# Patient Record
Sex: FEMALE | Race: WHITE | NOT HISPANIC OR LATINO | Employment: UNEMPLOYED | URBAN - METROPOLITAN AREA
[De-identification: names, ages, dates, MRNs, and addresses within clinical notes are randomized per-mention and may not be internally consistent; named-entity substitution may affect disease eponyms.]

---

## 2017-04-24 ENCOUNTER — HOSPITAL ENCOUNTER (EMERGENCY)
Facility: MEDICAL CENTER | Age: 27
End: 2017-04-24
Attending: EMERGENCY MEDICINE
Payer: MEDICAID

## 2017-04-24 VITALS
OXYGEN SATURATION: 95 % | HEIGHT: 64 IN | DIASTOLIC BLOOD PRESSURE: 59 MMHG | BODY MASS INDEX: 19.63 KG/M2 | HEART RATE: 61 BPM | SYSTOLIC BLOOD PRESSURE: 101 MMHG | RESPIRATION RATE: 12 BRPM | WEIGHT: 115 LBS | TEMPERATURE: 96.8 F

## 2017-04-24 DIAGNOSIS — F41.9 ANXIETY: ICD-10-CM

## 2017-04-24 DIAGNOSIS — R10.84 GENERALIZED ABDOMINAL PAIN: ICD-10-CM

## 2017-04-24 DIAGNOSIS — F12.10 MARIJUANA ABUSE: ICD-10-CM

## 2017-04-24 DIAGNOSIS — R11.15 NON-INTRACTABLE CYCLICAL VOMITING WITH NAUSEA: ICD-10-CM

## 2017-04-24 LAB
ALBUMIN SERPL BCP-MCNC: 4.2 G/DL (ref 3.2–4.9)
ALBUMIN/GLOB SERPL: 1.6 G/DL
ALP SERPL-CCNC: 77 U/L (ref 30–99)
ALT SERPL-CCNC: 14 U/L (ref 2–50)
AMORPH CRY #/AREA URNS HPF: PRESENT /HPF
AMPHET UR QL SCN: NEGATIVE
ANION GAP SERPL CALC-SCNC: 11 MMOL/L (ref 0–11.9)
APPEARANCE UR: ABNORMAL
AST SERPL-CCNC: 19 U/L (ref 12–45)
BACTERIA #/AREA URNS HPF: ABNORMAL /HPF
BARBITURATES UR QL SCN: NEGATIVE
BASOPHILS # BLD AUTO: 0.1 % (ref 0–1.8)
BASOPHILS # BLD: 0.01 K/UL (ref 0–0.12)
BENZODIAZ UR QL SCN: NEGATIVE
BILIRUB SERPL-MCNC: 0.8 MG/DL (ref 0.1–1.5)
BILIRUB UR QL STRIP.AUTO: NEGATIVE
BUN SERPL-MCNC: 15 MG/DL (ref 8–22)
BZE UR QL SCN: NEGATIVE
CALCIUM SERPL-MCNC: 9 MG/DL (ref 8.5–10.5)
CANNABINOIDS UR QL SCN: POSITIVE
CHLORIDE SERPL-SCNC: 100 MMOL/L (ref 96–112)
CO2 SERPL-SCNC: 26 MMOL/L (ref 20–33)
COLOR UR: ABNORMAL
CREAT SERPL-MCNC: 0.49 MG/DL (ref 0.5–1.4)
CULTURE IF INDICATED INDCX: YES UA CULTURE
EKG IMPRESSION: NORMAL
EOSINOPHIL # BLD AUTO: 0 K/UL (ref 0–0.51)
EOSINOPHIL NFR BLD: 0 % (ref 0–6.9)
EPI CELLS #/AREA URNS HPF: ABNORMAL /HPF
ERYTHROCYTE [DISTWIDTH] IN BLOOD BY AUTOMATED COUNT: 41.8 FL (ref 35.9–50)
GFR SERPL CREATININE-BSD FRML MDRD: >60 ML/MIN/1.73 M 2
GLOBULIN SER CALC-MCNC: 2.7 G/DL (ref 1.9–3.5)
GLUCOSE SERPL-MCNC: 98 MG/DL (ref 65–99)
GLUCOSE UR STRIP.AUTO-MCNC: NEGATIVE MG/DL
HCG UR QL: NEGATIVE
HCT VFR BLD AUTO: 37.5 % (ref 37–47)
HGB BLD-MCNC: 13.5 G/DL (ref 12–16)
IMM GRANULOCYTES # BLD AUTO: 0.02 K/UL (ref 0–0.11)
IMM GRANULOCYTES NFR BLD AUTO: 0.2 % (ref 0–0.9)
KETONES UR STRIP.AUTO-MCNC: ABNORMAL MG/DL
LEUKOCYTE ESTERASE UR QL STRIP.AUTO: ABNORMAL
LIPASE SERPL-CCNC: 4 U/L (ref 11–82)
LYMPHOCYTES # BLD AUTO: 1.38 K/UL (ref 1–4.8)
LYMPHOCYTES NFR BLD: 16 % (ref 22–41)
MCH RBC QN AUTO: 33.3 PG (ref 27–33)
MCHC RBC AUTO-ENTMCNC: 36 G/DL (ref 33.6–35)
MCV RBC AUTO: 92.6 FL (ref 81.4–97.8)
MDMA UR QL SCN: NEGATIVE
METHADONE UR QL SCN: NEGATIVE
MICRO URNS: ABNORMAL
MONOCYTES # BLD AUTO: 0.36 K/UL (ref 0–0.85)
MONOCYTES NFR BLD AUTO: 4.2 % (ref 0–13.4)
MUCOUS THREADS #/AREA URNS HPF: ABNORMAL /HPF
NEUTROPHILS # BLD AUTO: 6.83 K/UL (ref 2–7.15)
NEUTROPHILS NFR BLD: 79.5 % (ref 44–72)
NITRITE UR QL STRIP.AUTO: NEGATIVE
NRBC # BLD AUTO: 0 K/UL
NRBC BLD AUTO-RTO: 0 /100 WBC
OPIATES UR QL SCN: NEGATIVE
OXYCODONE UR QL SCN: NEGATIVE
PCP UR QL SCN: NEGATIVE
PH UR STRIP.AUTO: 6 [PH]
PLATELET # BLD AUTO: 175 K/UL (ref 164–446)
PMV BLD AUTO: 10.6 FL (ref 9–12.9)
POTASSIUM SERPL-SCNC: 3 MMOL/L (ref 3.6–5.5)
PROPOXYPH UR QL SCN: NEGATIVE
PROT SERPL-MCNC: 6.9 G/DL (ref 6–8.2)
PROT UR QL STRIP: 70 MG/DL
RBC # BLD AUTO: 4.05 M/UL (ref 4.2–5.4)
RBC # URNS HPF: ABNORMAL /HPF
RBC UR QL AUTO: ABNORMAL
SODIUM SERPL-SCNC: 137 MMOL/L (ref 135–145)
SP GR UR REFRACTOMETRY: 1.02
SP GR UR STRIP.AUTO: 1.02
WBC # BLD AUTO: 8.6 K/UL (ref 4.8–10.8)
WBC #/AREA URNS HPF: ABNORMAL /HPF

## 2017-04-24 PROCEDURE — 81001 URINALYSIS AUTO W/SCOPE: CPT | Mod: 59

## 2017-04-24 PROCEDURE — 83690 ASSAY OF LIPASE: CPT

## 2017-04-24 PROCEDURE — 700102 HCHG RX REV CODE 250 W/ 637 OVERRIDE(OP): Performed by: EMERGENCY MEDICINE

## 2017-04-24 PROCEDURE — 96361 HYDRATE IV INFUSION ADD-ON: CPT

## 2017-04-24 PROCEDURE — 87086 URINE CULTURE/COLONY COUNT: CPT

## 2017-04-24 PROCEDURE — 96374 THER/PROPH/DIAG INJ IV PUSH: CPT

## 2017-04-24 PROCEDURE — 99284 EMERGENCY DEPT VISIT MOD MDM: CPT

## 2017-04-24 PROCEDURE — 700105 HCHG RX REV CODE 258: Performed by: EMERGENCY MEDICINE

## 2017-04-24 PROCEDURE — 80053 COMPREHEN METABOLIC PANEL: CPT

## 2017-04-24 PROCEDURE — 85025 COMPLETE CBC W/AUTO DIFF WBC: CPT

## 2017-04-24 PROCEDURE — 93005 ELECTROCARDIOGRAM TRACING: CPT

## 2017-04-24 PROCEDURE — 81025 URINE PREGNANCY TEST: CPT

## 2017-04-24 PROCEDURE — 36415 COLL VENOUS BLD VENIPUNCTURE: CPT

## 2017-04-24 PROCEDURE — 700111 HCHG RX REV CODE 636 W/ 250 OVERRIDE (IP): Performed by: EMERGENCY MEDICINE

## 2017-04-24 PROCEDURE — 80307 DRUG TEST PRSMV CHEM ANLYZR: CPT

## 2017-04-24 PROCEDURE — 96375 TX/PRO/DX INJ NEW DRUG ADDON: CPT

## 2017-04-24 PROCEDURE — A9270 NON-COVERED ITEM OR SERVICE: HCPCS | Performed by: EMERGENCY MEDICINE

## 2017-04-24 RX ORDER — SODIUM CHLORIDE 9 MG/ML
1000 INJECTION, SOLUTION INTRAVENOUS CONTINUOUS
Status: DISCONTINUED | OUTPATIENT
Start: 2017-04-24 | End: 2017-04-24

## 2017-04-24 RX ORDER — ONDANSETRON 2 MG/ML
4 INJECTION INTRAMUSCULAR; INTRAVENOUS ONCE
Status: COMPLETED | OUTPATIENT
Start: 2017-04-24 | End: 2017-04-24

## 2017-04-24 RX ORDER — LORAZEPAM 2 MG/ML
INJECTION INTRAMUSCULAR
Status: DISCONTINUED
Start: 2017-04-24 | End: 2017-04-24 | Stop reason: HOSPADM

## 2017-04-24 RX ORDER — NITROFURANTOIN MACROCRYSTALS 100 MG/1
100 CAPSULE ORAL 2 TIMES DAILY
Qty: 10 CAP | Refills: 0 | Status: ON HOLD | OUTPATIENT
Start: 2017-04-24 | End: 2017-04-30

## 2017-04-24 RX ORDER — KETOROLAC TROMETHAMINE 30 MG/ML
30 INJECTION, SOLUTION INTRAMUSCULAR; INTRAVENOUS ONCE
Status: COMPLETED | OUTPATIENT
Start: 2017-04-24 | End: 2017-04-24

## 2017-04-24 RX ORDER — SODIUM CHLORIDE 9 MG/ML
1000 INJECTION, SOLUTION INTRAVENOUS CONTINUOUS
Status: DISCONTINUED | OUTPATIENT
Start: 2017-04-24 | End: 2017-04-24 | Stop reason: CLARIF

## 2017-04-24 RX ORDER — CEFTRIAXONE 1 G/1
1 INJECTION, POWDER, FOR SOLUTION INTRAMUSCULAR; INTRAVENOUS ONCE
Status: DISCONTINUED | OUTPATIENT
Start: 2017-04-24 | End: 2017-04-24 | Stop reason: CLARIF

## 2017-04-24 RX ORDER — ONDANSETRON 4 MG/1
4 TABLET, ORALLY DISINTEGRATING ORAL EVERY 8 HOURS PRN
Qty: 10 TAB | Refills: 1 | Status: SHIPPED | OUTPATIENT
Start: 2017-04-24 | End: 2017-04-29

## 2017-04-24 RX ORDER — NITROFURANTOIN MACROCRYSTALS 50 MG/1
100 CAPSULE ORAL ONCE
Status: COMPLETED | OUTPATIENT
Start: 2017-04-24 | End: 2017-04-24

## 2017-04-24 RX ORDER — LORAZEPAM 2 MG/ML
1 INJECTION INTRAMUSCULAR ONCE
Status: COMPLETED | OUTPATIENT
Start: 2017-04-24 | End: 2017-04-24

## 2017-04-24 RX ORDER — CYCLOBENZAPRINE HCL 10 MG
10 TABLET ORAL 3 TIMES DAILY PRN
Qty: 20 TAB | Refills: 0 | Status: SHIPPED | OUTPATIENT
Start: 2017-04-24 | End: 2018-03-05

## 2017-04-24 RX ORDER — HYDROCODONE BITARTRATE AND ACETAMINOPHEN 5; 325 MG/1; MG/1
1-2 TABLET ORAL EVERY 8 HOURS PRN
Qty: 15 TAB | Refills: 0 | Status: SHIPPED | OUTPATIENT
Start: 2017-04-24 | End: 2017-04-29

## 2017-04-24 RX ORDER — CYCLOBENZAPRINE HCL 10 MG
10 TABLET ORAL 3 TIMES DAILY PRN
Qty: 20 TAB | Refills: 0 | Status: SHIPPED | OUTPATIENT
Start: 2017-04-24 | End: 2017-04-29

## 2017-04-24 RX ADMIN — ONDANSETRON 4 MG: 2 INJECTION INTRAMUSCULAR; INTRAVENOUS at 11:30

## 2017-04-24 RX ADMIN — LORAZEPAM 1 MG: 2 INJECTION INTRAMUSCULAR; INTRAVENOUS at 11:31

## 2017-04-24 RX ADMIN — KETOROLAC TROMETHAMINE 30 MG: 30 INJECTION, SOLUTION INTRAMUSCULAR; INTRAVENOUS at 11:30

## 2017-04-24 RX ADMIN — SODIUM CHLORIDE 1000 ML: 9 INJECTION, SOLUTION INTRAVENOUS at 11:30

## 2017-04-24 RX ADMIN — NITROFURANTOIN (MACROCRYSTALS) 100 MG: 50 CAPSULE ORAL at 13:47

## 2017-04-24 ASSESSMENT — PAIN SCALES - GENERAL
PAINLEVEL_OUTOF10: 8
PAINLEVEL_OUTOF10: 10

## 2017-04-24 NOTE — ED AVS SNAPSHOT
Home Care Instructions                                                                                                                Rupa Yang   MRN: [id number]    Department:  [facility], Emergency Dept   Date of Visit:  4/24/2017            [facility], Emergency Dept    [address]    [place] [address]    Phone:  [phone]      You were seen by     Emeterio Carroll D.O.      Your Diagnosis Was     Generalized abdominal pain     R10.84       These are the medications you received during your hospitalization from 04/24/2017 1045 to 04/24/2017 1349     Date/Time Order Dose Route Action    04/24/2017 1131 lorazepam (ATIVAN) injection 1 mg 1 mg Intravenous Given    04/24/2017 1130 ketorolac (TORADOL) 30 mg injection 30 mg Intravenous Given    04/24/2017 1130 ondansetron (ZOFRAN) syringe/vial injection 4 mg 4 mg Intravenous Given    04/24/2017 1130 NS infusion 1,000 mL 1,000 mL Intravenous New Bag    04/24/2017 1347 nitrofurantoin (MACRODANTIN) capsule 100 mg 100 mg Oral Given      Medication Information     Review all of your home medications and newly ordered medications with your primary doctor and/or pharmacist as soon as possible. Follow medication instructions as directed by your doctor and/or pharmacist.     Please keep your complete medication list with you and share with your physician. Update the information when medications are discontinued, doses are changed, or new medications (including over-the-counter products) are added; and carry medication information at all times in the event of emergency situations.               Medication List      START taking these medications        Instructions    Morning Afternoon Evening Bedtime    nitrofurantoin macro crystals 100 MG Caps   Last time this was given:  100 mg on 4/24/2017  1:47 PM   Commonly known as:  MACRODANTIN        Take 1 Cap by mouth 2 Times a Day for 7 days.   Dose:  100 mg                          ASK your doctor about these medications        Instructions    Morning Afternoon Evening Bedtime    alprazolam 0.25 MG Tabs   Commonly known as:  XANAX        Take 1 Tab by mouth at bedtime as needed for Sleep.   Dose:  0.25 mg                        * cyclobenzaprine 5 MG tablet   What changed:  Another medication with the same name was added. Make sure you understand how and when to take each.   Commonly known as:  FLEXERIL   Ask about: Which instructions should I use?        Take 1-2 Tabs by mouth 3 times a day as needed.   Dose:  5-10 mg                        * cyclobenzaprine 10 MG Tabs   What changed:  You were already taking a medication with the same name, and this prescription was added. Make sure you understand how and when to take each.   Commonly known as:  FLEXERIL   Ask about: Which instructions should I use?        Take 1 Tab by mouth 3 times a day as needed for Muscle Spasms.   Dose:  10 mg                        * cyclobenzaprine 10 MG Tabs   What changed:  You were already taking a medication with the same name, and this prescription was added. Make sure you understand how and when to take each.   Commonly known as:  FLEXERIL   Ask about: Which instructions should I use?        Take 1 Tab by mouth 3 times a day as needed for Muscle Spasms.   Dose:  10 mg                        * hydrocodone-acetaminophen 5-325 MG Tabs per tablet   What changed:  Another medication with the same name was added. Make sure you understand how and when to take each.   Commonly known as:  NORCO   Ask about: Which instructions should I use?        Take 1-2 Tabs by mouth as needed. Indications: Moderate to Moderately Severe Pain   Dose:  1-2 Tab                        * hydrocodone-acetaminophen 5-325 MG Tabs per tablet   What changed:  Another medication with the same name was added. Make sure you understand how and when to take each.   Commonly known as:  NORCO   Ask about: Which instructions should I use?         Take 1-2 Tabs by mouth every four hours as needed.   Dose:  1-2 Tab                        * hydrocodone-acetaminophen 5-325 MG Tabs per tablet   What changed:  Another medication with the same name was added. Make sure you understand how and when to take each.   Commonly known as:  NORCO   Ask about: Which instructions should I use?        Take 1-2 Tabs by mouth every 6 hours as needed.   Dose:  1-2 Tab                        * hydrocodone-acetaminophen 5-325 MG Tabs per tablet   What changed:  Another medication with the same name was added. Make sure you understand how and when to take each.   Commonly known as:  NORCO   Ask about: Which instructions should I use?        Take 1 Tab by mouth every four hours as needed.   Dose:  1 Tab                        * hydrocodone-acetaminophen 5-325 MG Tabs per tablet   What changed:  Another medication with the same name was added. Make sure you understand how and when to take each.   Commonly known as:  NORCO   Ask about: Which instructions should I use?        Take 1-2 Tabs by mouth every 6 hours as needed.   Dose:  1-2 Tab                        * hydrocodone-acetaminophen 5-325 MG Tabs per tablet   What changed:  You were already taking a medication with the same name, and this prescription was added. Make sure you understand how and when to take each.   Commonly known as:  NORCO   Ask about: Which instructions should I use?        Take 1-2 Tabs by mouth every 8 hours as needed (pain).   Dose:  1-2 Tab                        MIDOL MAX ST MENSTRUAL FORMULA PO        Take 1 Tab by mouth as needed.   Dose:  1 Tab                        omeprazole 20 MG delayed-release capsule   Commonly known as:  PRILOSEC        Take 1 Cap by mouth every day.   Dose:  20 mg                        * ondansetron 4 MG Tabs tablet   Commonly known as:  ZOFRAN        Take 1 Tab by mouth every 8 hours as needed (FOR NAUSEA OR VIMITING) for up to 6 doses.   Dose:  4 mg                        *  ondansetron 4 MG Tabs tablet   Commonly known as:  ZOFRAN        Take 1 Tab by mouth every four hours as needed for Nausea/Vomiting.   Dose:  4 mg                        * ondansetron 8 MG Tbdp   What changed:  Another medication with the same name was added. Make sure you understand how and when to take each.   Commonly known as:  ZOFRAN ODT   Ask about: Which instructions should I use?        DISSOLVE ONE TABLET BY MOUTH EVERY 8 HOURS AS NEEDED FOR NAUSEA OR VOMITING                        * ondansetron 4 MG Tbdp   What changed:  Another medication with the same name was added. Make sure you understand how and when to take each.   Commonly known as:  ZOFRAN ODT   Ask about: Which instructions should I use?        Take 1 Tab by mouth every four hours as needed.   Dose:  4 mg                        * ondansetron 4 MG Tbdp   What changed:  You were already taking a medication with the same name, and this prescription was added. Make sure you understand how and when to take each.   Commonly known as:  ZOFRAN ODT   Ask about: Which instructions should I use?        Take 1 Tab by mouth every 8 hours as needed.   Dose:  4 mg                        sucralfate 1 GM Tabs   Commonly known as:  CARAFATE        Take 1 Tab by mouth 4 Times a Day,Before Meals and at Bedtime.   Dose:  1 g                        * Notice:  This list has 14 medication(s) that are the same as other medications prescribed for you. Read the directions carefully, and ask your doctor or other care provider to review them with you.         Where to Get Your Medications      These medications were sent to [id number]  [address] [address] [address] [facility] [place] [address]     Phone:  [phone]    - cyclobenzaprine 10 MG Tabs      You can get these medications from any pharmacy     Bring a paper prescription for each of these medications    - cyclobenzaprine 10 MG Tabs  - hydrocodone-acetaminophen  5-325 MG Tabs per tablet  - nitrofurantoin macro crystals 100 MG Caps  - ondansetron 4 MG Tbdp            Procedures and tests performed during your visit     BETA-HCG QUALITATIVE URINE    CARDIAC MONITORING    CBC WITH DIFFERENTIAL    COMP METABOLIC PANEL    EKG (ER)    ESTIMATED GFR    LIPASE    O2 Protocol    REFRACTOMETER SG    SALINE LOCK    URINALYSIS,CULTURE IF INDICATED    URINE CULTURE(NEW)    URINE DRUG SCREEN    URINE MICROSCOPIC (W/UA)        Discharge Instructions       Abdominal Pain (Nonspecific)  Your exam might not show the exact reason you have abdominal pain. Since there are many different causes of abdominal pain, another checkup and more tests may be needed. It is very important to follow up for lasting (persistent) or worsening symptoms. A possible cause of abdominal pain in any person who still has his or her appendix is acute appendicitis. Appendicitis is often hard to diagnose. Normal blood tests, urine tests, ultrasound, and CT scans do not completely rule out early appendicitis or other causes of abdominal pain. Sometimes, only the changes that happen over time will allow appendicitis and other causes of abdominal pain to be determined. Other potential problems that may require surgery may also take time to become more apparent. Because of this, it is important that you follow all of the instructions below.  HOME CARE INSTRUCTIONS   · Rest as much as possible.   · Do not eat solid food until your pain is gone.   · While adults or children have pain: A diet of water, weak decaffeinated tea, broth or bouillon, gelatin, oral rehydration solutions (ORS), frozen ice pops, or ice chips may be helpful.   · When pain is gone in adults or children: Start a light diet (dry toast, crackers, applesauce, or white rice). Increase the diet slowly as long as it does not bother you. Eat no dairy products (including cheese and eggs) and no spicy, fatty, fried, or high-fiber foods.   · Use no alcohol,  caffeine, or cigarettes.   · Take your regular medicines unless your caregiver told you not to.   · Take any prescribed medicine as directed.   · Only take over-the-counter or prescription medicines for pain, discomfort, or fever as directed by your caregiver. Do not give aspirin to children.   If your caregiver has given you a follow-up appointment, it is very important to keep that appointment. Not keeping the appointment could result in a permanent injury and/or lasting (chronic) pain and/or disability. If there is any problem keeping the appointment, you must call to reschedule.   SEEK IMMEDIATE MEDICAL CARE IF:   · Your pain is not gone in 24 hours.   · Your pain becomes worse, changes location, or feels different.   · You or your child has an oral temperature above 102° F (38.9° C), not controlled by medicine.   · Your baby is older than 3 months with a rectal temperature of 102° F (38.9° C) or higher.   · Your baby is 3 months old or younger with a rectal temperature of 100.4° F (38° C) or higher.   · You have shaking chills.   · You keep throwing up (vomiting) or cannot drink liquids.   · There is blood in your vomit or you see blood in your bowel movements.   · Your bowel movements become dark or black.   · You have frequent bowel movements.   · Your bowel movements stop (become blocked) or you cannot pass gas.   · You have bloody, frequent, or painful urination.   · You have yellow discoloration in the skin or whites of the eyes.   · Your stomach becomes bloated or bigger.   · You have dizziness or fainting.   · You have chest or back pain.   MAKE SURE YOU:   · Understand these instructions.   · Will watch your condition.   · Will get help right away if you are not doing well or get worse.   Document Released: 12/18/2006 Document Revised: 03/11/2013 Document Reviewed: 11/15/2010  ExitCare® Patient Information ©2013 [employer].          Patient Information     Patient Information    Following emergency  treatment: all patient requiring follow-up care must return either to a private physician or a clinic if your condition worsens before you are able to obtain further medical attention, please return to the emergency room.     Billing Information    At [facility], we work to make the billing process streamlined for our patients.  Our Representatives are here to answer any questions you may have regarding your hospital bill.  If you have insurance coverage and have supplied your insurance information to us, we will submit a claim to your insurer on your behalf.  Should you have any questions regarding your bill, we can be reached online or by phone as follows:  Online: You are able pay your bills online or live chat with our representatives about any billing questions you may have. We are here to help Monday - Friday from 8:00am to 7:30pm and 9:00am - 12:00pm on Saturdays.  Please visit [email or web address]  for more information.   Phone:  [phone] or [phone]    Please note that your emergency physician, surgeon, pathologist, radiologist, anesthesiologist, and other specialists are not employed by [facility] and will therefore bill separately for their services.  Please contact them directly for any questions concerning their bills at the numbers below:     Emergency Physician Services:  [phone]  [facility]:  [phone]  [facility]:  [phone]  [facility]:  [phone]    1. Your final bill may vary from the amount quoted upon discharge if all procedures are not complete at that time, or if your doctor has additional procedures of which we are not aware. You will receive an additional bill if you return to the Emergency Department at [facility] for suture removal regardless of the facility of which the sutures were placed.     2. Please arrange for settlement of this account at the emergency  registration.    3. All self-pay accounts are due in full at the time of treatment.  If you are unable to meet this obligation then payment is expected within 4-5 days.     4. If you have had radiology studies (CT, X-ray, Ultrasound, MRI), you have received a preliminary result during your emergency department visit. Please contact the radiology department [phone] to receive a copy of your final result. Please discuss the Final result with your primary physician or with the follow up physician provided.     Crisis Hotline:  [place] Crisis Hotline:  9-703-OBCTYNF or 1-772.810.5694  [place] Crisis Hotline:    1-294.126.3318 or 475-513-7155         ED Discharge Follow Up Questions    1. In order to provide you with very good care, we would like to follow up with a phone call in the next few days.  May we have your permission to contact you?     YES /  NO    2. What is the best phone number to call you? (       )_____-__________    3. What is the best time to call you?      Morning  /  Afternoon  /  Evening                   Patient Signature:  ____________________________________________________________    Date:  ____________________________________________________________

## 2017-04-24 NOTE — DISCHARGE INSTRUCTIONS
Abdominal Pain (Nonspecific)  Your exam might not show the exact reason you have abdominal pain. Since there are many different causes of abdominal pain, another checkup and more tests may be needed. It is very important to follow up for lasting (persistent) or worsening symptoms. A possible cause of abdominal pain in any person who still has his or her appendix is acute appendicitis. Appendicitis is often hard to diagnose. Normal blood tests, urine tests, ultrasound, and CT scans do not completely rule out early appendicitis or other causes of abdominal pain. Sometimes, only the changes that happen over time will allow appendicitis and other causes of abdominal pain to be determined. Other potential problems that may require surgery may also take time to become more apparent. Because of this, it is important that you follow all of the instructions below.  HOME CARE INSTRUCTIONS   · Rest as much as possible.   · Do not eat solid food until your pain is gone.   · While adults or children have pain: A diet of water, weak decaffeinated tea, broth or bouillon, gelatin, oral rehydration solutions (ORS), frozen ice pops, or ice chips may be helpful.   · When pain is gone in adults or children: Start a light diet (dry toast, crackers, applesauce, or white rice). Increase the diet slowly as long as it does not bother you. Eat no dairy products (including cheese and eggs) and no spicy, fatty, fried, or high-fiber foods.   · Use no alcohol, caffeine, or cigarettes.   · Take your regular medicines unless your caregiver told you not to.   · Take any prescribed medicine as directed.   · Only take over-the-counter or prescription medicines for pain, discomfort, or fever as directed by your caregiver. Do not give aspirin to children.   If your caregiver has given you a follow-up appointment, it is very important to keep that appointment. Not keeping the appointment could result in a permanent injury and/or lasting (chronic) pain  and/or disability. If there is any problem keeping the appointment, you must call to reschedule.   SEEK IMMEDIATE MEDICAL CARE IF:   · Your pain is not gone in 24 hours.   · Your pain becomes worse, changes location, or feels different.   · You or your child has an oral temperature above 102° F (38.9° C), not controlled by medicine.   · Your baby is older than 3 months with a rectal temperature of 102° F (38.9° C) or higher.   · Your baby is 3 months old or younger with a rectal temperature of 100.4° F (38° C) or higher.   · You have shaking chills.   · You keep throwing up (vomiting) or cannot drink liquids.   · There is blood in your vomit or you see blood in your bowel movements.   · Your bowel movements become dark or black.   · You have frequent bowel movements.   · Your bowel movements stop (become blocked) or you cannot pass gas.   · You have bloody, frequent, or painful urination.   · You have yellow discoloration in the skin or whites of the eyes.   · Your stomach becomes bloated or bigger.   · You have dizziness or fainting.   · You have chest or back pain.   MAKE SURE YOU:   · Understand these instructions.   · Will watch your condition.   · Will get help right away if you are not doing well or get worse.   Document Released: 12/18/2006 Document Revised: 03/11/2013 Document Reviewed: 11/15/2010  ExitCare® Patient Information ©2013 [employer].

## 2017-04-24 NOTE — ED AVS SNAPSHOT
Pinger Access Code: Activation code not generated  Current Pinger Status: Active    [employer]  A secure, online tool to manage your health information     [employer]’s Pinger® is a secure, online tool that connects you to your personalized health information from the privacy of your home -- day or night - making it very easy for you to manage your healthcare. Once the activation process is completed, you can even access your medical information using the Pinger cathleen, which is available for free in the Apple Cathleen store or Google Play store.     Pinger provides the following levels of access (as shown below):   My Chart Features   [facility] Primary Care Doctor [facility]  Specialists [facility]  Urgent  Care Non-[facility]  Primary Care  Doctor   Email your healthcare team securely and privately 24/7 X X X X   Manage appointments: schedule your next appointment; view details of past/upcoming appointments X      Request prescription refills. X      View recent personal medical records, including lab and immunizations X X X X   View health record, including health history, allergies, medications X X X X   Read reports about your outpatient visits, procedures, consult and ER notes X X X X   See your discharge summary, which is a recap of your hospital and/or ER visit that includes your diagnosis, lab results, and care plan. X X       How to register for Pinger:  1. Go to  [email or web address].  2. Click on the Sign Up Now box, which takes you to the New Member Sign Up page. You will need to provide the following information:  a. Enter your Pinger Access Code exactly as it appears at the top of this page. (You will not need to use this code after you’ve completed the sign-up process. If you do not sign up before the expiration date, you must request a new code.)   b. Enter your date of birth.   c. Enter your home email address.   d. Click Submit, and follow the next screen’s instructions.  3. Create a Pinger ID. This will  be your YCh[employer] login ID and cannot be changed, so think of one that is secure and easy to remember.  4. Create a YCharts password. You can change your password at any time.  5. Enter your Password Reset Question and Answer. This can be used at a later time if you forget your password.   6. Enter your e-mail address. This allows you to receive e-mail notifications when new information is available in YCharts.  7. Click Sign Up. You can now view your health information.    For assistance activating your [employer]Ch[employer] account, call [phone]

## 2017-04-24 NOTE — ED NOTES
All discharge instructions given to pt as well as Rx for **. Pt verbalized understanding of all discharge instructions. All lines removed prior to discharge. All questions answered.

## 2017-04-24 NOTE — ED AVS SNAPSHOT
4/24/2017    Rupa Yang  [address] [place] [address]    Dear Rupa:    [facility] wants to ensure your discharge home is safe and you or your loved ones have had all of your questions answered regarding your care after you leave the hospital.    Below is a list of resources and contact information should you have any questions regarding your hospital stay, follow-up instructions, or active medical symptoms.    Questions or Concerns Regarding… Contact   Medical Questions Related to Your Discharge  (7 days a week, 8am-5pm) Contact a Nurse Care Coordinator   [phone]   Medical Questions Not Related to Your Discharge  (24 hours a day / 7 days a week)  Contact the Nurse Health Line   [phone]    Medications or Discharge Instructions Refer to your discharge packet   or contact your [facility] Primary Care Provider   [phone]   Follow-up Appointment(s) Schedule your appointment via [employer]   or contact Scheduling [phone]   Billing Review your statement via [employer]  or contact Billing [phone]   Medical Records Review your records via [employer]   or contact Medical Records [phone]     You may receive a telephone call within two days of discharge. This call is to make certain you understand your discharge instructions and have the opportunity to have any questions answered. You can also easily access your medical information, test results and upcoming appointments via the [employer] online health management tool. You can learn more and sign up at [employer]. For assistance setting up your [employer] account, please call [phone].    Once again, we want to ensure your discharge home is safe and that you have a clear understanding of any next steps in your care. If you have any questions or concerns, please do not hesitate to contact us, we are here for you. Thank you for choosing [facility] for your healthcare needs.    Sincerely,    Your [facility] Healthcare Team

## 2017-04-24 NOTE — ED NOTES
Chief Complaint   Patient presents with   • Seizure     pt back from triage actively seizure     Witnessed seizure lasting approx 45 seconds. Unable to obtain information from pt. Post ictal at this time.

## 2017-04-25 NOTE — ED PROVIDER NOTES
"ED Provider Note    CHIEF COMPLAINT  Chief Complaint   Patient presents with   • Seizure     pt back from triage actively seizure       HPI  Rupa Yang is a 26 y.o. female who presents to emergency room today with complaints of abdominal pain, seizure. Patient states she has a history of chronic abdominal pain etiology unclear. She was on pain medication for this Norco and muscle relaxants and Flexeril however her doctor recently left Dr. Babcock and she has not been able to get a new physician. She has which she admits to his pseudoseizures because of the pain. Denies chest pain, short of breath, fever or chills. Pain is diffuse over the abdomen bradycardia and cross were movement palpation currently rated at a 10/10. Nausea no vomiting no changes to bowel/bladder. Symptoms occurred in the context of her normal activities. Please note despite nurses notes patient had witnessed seizure from the ER physician she is easily engaged and verbal during this seizure and able to move arms and legs and cooperate this is not a tonic-clonic seizure and patient is not postictal.    REVIEW OF SYSTEMS  See HPI for further details. All other systems are negative.     PAST MEDICAL HISTORY  Past Medical History   Diagnosis Date   • Scoliosis      dx age 10, chirpractor told severe curvature, left leg shorter.    • Eczema      arms, neck, mild currently   • Substance abuse      meth, coke, heroin, marijuana stopped after parents OD when she age 12.    • Gastroenteritis    • Arrhythmia      \"it feels like palpitations\"   • Heart burn    • Indigestion    • Other specified disorder of intestines      diarrhea   • Dental disorder      \"my teeth are completely dead. Exposed nerves\"   • Pain 02-24-15     chronic back, abd., 2/10       FAMILY HISTORY  [unfilled]    SOCIAL HISTORY  Social History     Social History   • Marital Status:      Spouse Name: N/A   • Number of Children: N/A   • Years of Education: N/A     Social History " Main Topics   • Smoking status: Current Every Day Smoker -- 0.50 packs/day     Types: Cigarettes   • Smokeless tobacco: Never Used   • Alcohol Use: No   • Drug Use: None      Comment: in past meth, cocaine, weed at age 12 last used at age 13   • Sexual Activity:     Partners: Male      Comment: planned pregnancy FOB involved     Other Topics Concern   • None     Social History Narrative       SURGICAL HISTORY  Past Surgical History   Procedure Laterality Date   • Primary c section  11/27/2013     Performed by Haider Garcia M.D. at [facility]   • Judy by laparoscopy  3/3/2015     Procedure: JUDY BY LAPAROSCOPY;  Surgeon: Aftab Churchill M.D.;  Location: SURGERY [facility];  Service:        CURRENT MEDICATIONS  Home Medications     Reviewed by Heidy Maier R.N. (Registered Nurse) on 04/24/17 at 1101  Med List Status: Partial    Medication Last Dose Status    Acetaminophen-Caff-Pyrilamine (MIDOL MAX ST MENSTRUAL FORMULA PO) Taking-prn Active    alprazolam (XANAX) 0.25 MG Tab  Active    cyclobenzaprine (FLEXERIL) 5 MG tablet  Active    hydrocodone-acetaminophen (NORCO) 5-325 MG Tab per tablet  Active    hydrocodone-acetaminophen (NORCO) 5-325 MG TABS per tablet Not Taking Active    hydrocodone-acetaminophen (NORCO) 5-325 MG TABS per tablet Not Taking Active    hydrocodone-acetaminophen (NORCO) 5-325 MG TABS per tablet  Active    hydrocodone-acetaminophen (NORCO) 5-325 MG TABS per tablet  Active    omeprazole (PRILOSEC) 20 MG delayed-release capsule  Active    ondansetron (ZOFRAN ODT) 4 MG TABLET DISPERSIBLE  Active    ondansetron (ZOFRAN ODT) 8 MG TBDP  Active    ondansetron (ZOFRAN) 4 MG Tab tablet  Active    ondansetron (ZOFRAN) 4 MG Tab tablet  Active    sucralfate (CARAFATE) 1 GM Tab  Active                ALLERGIES  Allergies   Allergen Reactions   • Divalproex Sodium Hives and Rash         • Risperidone Hives and Rash         • Sertraline Hcl [Zoloft] Hives and Rash         • Tegretol  "[Carbamazepine] Hives and Rash             PHYSICAL EXAM  VITAL SIGNS: /59 mmHg  Pulse 61  Temp(Src) 36 °C (96.8 °F)  Resp 12  Ht 1.626 m (5' 4\")  Wt 52.164 kg (115 lb)  BMI 19.73 kg/m2  SpO2 95%      Constitutional: Well developed, Well nourished, mild distress, Non-toxic appearance.   HENT: Normocephalic, Atraumatic, Bilateral external ears normal, Oropharynx moist, No oral exudates, Nose normal.   Eyes: PERRLA, EOMI, Conjunctiva normal, No discharge.   Neck: Normal range of motion, No tenderness, Supple, No stridor.   Lymphatic: No lymphadenopathy noted.   Cardiovascular: Normal heart rate, Normal rhythm, No murmurs, No rubs, No gallops.   Thorax & Lungs: Normal breath sounds, No respiratory distress, No wheezing, No chest tenderness.   Abdomen: Bowel sounds normal, Soft, diffuse tenderness, No masses, No pulsatile masses. No rebound, guarding or peritoneal signs noted.  Skin: Warm, Dry, No erythema, No rash.   Back: No tenderness, No CVA tenderness.    Extremities: Intact distal pulses, No edema, No tenderness, No cyanosis, No clubbing.   Musculoskeletal: Good range of motion in all major joints. No tenderness to palpation or major deformities noted.   Neurologic: Alert & oriented x 3, Normal motor function, Normal sensory function, No focal deficits noted.   Psychiatric: Affect normal, Judgment normal, Mood normal.    RADIOLOGY/PROCEDURES  No orders to display         COURSE & MEDICAL DECISION MAKING  Pertinent Labs & Imaging studies reviewed. (See chart for details)  Patient is afebrile she has normal white blood cell count the pain is been chronic I do not feel any imaging studies if necessary at this time patient is in agreement. She has history of anxiety she was given medication for this along with Toradol with relief of her pain. On reexamination she is smiling and active and appears in no distress, abdomen soft, supple and nonsurgical. She will follow-up and given referral to a new primary " care physician the ER  has been able to do this for her. She is placed on limited amount of her medication. No driving or operating heavy machinery she will return if persistent worsening symptoms or next 1-24 hours. We discussed cessation of marijuana. Patient had noted 10-20 white blood cells on urine specimen sent for culture she placed on Macrobid until culture results come back if she follows up with her PCP.    FINAL IMPRESSION  1. Acute abdominal pain  2. Anxiety  3. Marijuana abuse         Electronically signed by: Emeterio Carroll, 4/25/2017 10:15 AM

## 2017-04-26 LAB
BACTERIA UR CULT: NORMAL
SIGNIFICANT IND 70042: NORMAL
SITE SITE: NORMAL
SOURCE SOURCE: NORMAL

## 2017-04-29 ENCOUNTER — HOSPITAL ENCOUNTER (INPATIENT)
Facility: MEDICAL CENTER | Age: 27
LOS: 1 days | DRG: 101 | End: 2017-04-30
Attending: EMERGENCY MEDICINE | Admitting: INTERNAL MEDICINE
Payer: MEDICAID

## 2017-04-29 ENCOUNTER — RESOLUTE PROFESSIONAL BILLING HOSPITAL PROF FEE (OUTPATIENT)
Dept: OTHER | Facility: MEDICAL CENTER | Age: 27
End: 2017-04-29
Payer: COMMERCIAL

## 2017-04-29 ENCOUNTER — APPOINTMENT (OUTPATIENT)
Dept: RADIOLOGY | Facility: MEDICAL CENTER | Age: 27
DRG: 101 | End: 2017-04-29
Attending: EMERGENCY MEDICINE
Payer: MEDICAID

## 2017-04-29 DIAGNOSIS — R56.9 SEIZURES (HCC): ICD-10-CM

## 2017-04-29 DIAGNOSIS — G40.901 STATUS EPILEPTICUS (HCC): ICD-10-CM

## 2017-04-29 LAB
ALBUMIN SERPL BCP-MCNC: 3.9 G/DL (ref 3.2–4.9)
ALBUMIN/GLOB SERPL: 1.5 G/DL
ALP SERPL-CCNC: 63 U/L (ref 30–99)
ALT SERPL-CCNC: 9 U/L (ref 2–50)
AMORPH CRY #/AREA URNS HPF: PRESENT /HPF
AMPHET UR QL SCN: NEGATIVE
ANION GAP SERPL CALC-SCNC: 12 MMOL/L (ref 0–11.9)
APPEARANCE UR: ABNORMAL
APTT PPP: 25.5 SEC (ref 24.7–36)
AST SERPL-CCNC: 11 U/L (ref 12–45)
BARBITURATES UR QL SCN: NEGATIVE
BASOPHILS # BLD AUTO: 0.1 % (ref 0–1.8)
BASOPHILS # BLD: 0.01 K/UL (ref 0–0.12)
BENZODIAZ UR QL SCN: POSITIVE
BILIRUB SERPL-MCNC: 0.4 MG/DL (ref 0.1–1.5)
BILIRUB UR QL STRIP.AUTO: NEGATIVE
BUN SERPL-MCNC: 9 MG/DL (ref 8–22)
BZE UR QL SCN: NEGATIVE
CALCIUM SERPL-MCNC: 8.7 MG/DL (ref 8.5–10.5)
CANNABINOIDS UR QL SCN: POSITIVE
CHLORIDE SERPL-SCNC: 106 MMOL/L (ref 96–112)
CO2 SERPL-SCNC: 22 MMOL/L (ref 20–33)
COLOR UR: YELLOW
CREAT SERPL-MCNC: 0.44 MG/DL (ref 0.5–1.4)
CULTURE IF INDICATED INDCX: NO UA CULTURE
EOSINOPHIL # BLD AUTO: 0 K/UL (ref 0–0.51)
EOSINOPHIL NFR BLD: 0 % (ref 0–6.9)
EPI CELLS #/AREA URNS HPF: ABNORMAL /HPF
ERYTHROCYTE [DISTWIDTH] IN BLOOD BY AUTOMATED COUNT: 43.8 FL (ref 35.9–50)
ETHANOL BLD-MCNC: 0.01 G/DL
GFR SERPL CREATININE-BSD FRML MDRD: >60 ML/MIN/1.73 M 2
GLOBULIN SER CALC-MCNC: 2.6 G/DL (ref 1.9–3.5)
GLUCOSE BLD-MCNC: 111 MG/DL (ref 65–99)
GLUCOSE SERPL-MCNC: 114 MG/DL (ref 65–99)
GLUCOSE UR STRIP.AUTO-MCNC: NEGATIVE MG/DL
HCG SERPL QL: NEGATIVE
HCT VFR BLD AUTO: 37.9 % (ref 37–47)
HGB BLD-MCNC: 13.1 G/DL (ref 12–16)
HYALINE CASTS #/AREA URNS LPF: ABNORMAL /LPF
IMM GRANULOCYTES # BLD AUTO: 0.04 K/UL (ref 0–0.11)
IMM GRANULOCYTES NFR BLD AUTO: 0.4 % (ref 0–0.9)
INR PPP: 0.99 (ref 0.87–1.13)
KETONES UR STRIP.AUTO-MCNC: 60 MG/DL
LEUKOCYTE ESTERASE UR QL STRIP.AUTO: NEGATIVE
LYMPHOCYTES # BLD AUTO: 0.6 K/UL (ref 1–4.8)
LYMPHOCYTES NFR BLD: 6.7 % (ref 22–41)
MCH RBC QN AUTO: 32.9 PG (ref 27–33)
MCHC RBC AUTO-ENTMCNC: 34.6 G/DL (ref 33.6–35)
MCV RBC AUTO: 95.2 FL (ref 81.4–97.8)
MDMA UR QL SCN: NEGATIVE
METHADONE UR QL SCN: NEGATIVE
MICRO URNS: ABNORMAL
MONOCYTES # BLD AUTO: 0.17 K/UL (ref 0–0.85)
MONOCYTES NFR BLD AUTO: 1.9 % (ref 0–13.4)
MUCOUS THREADS #/AREA URNS HPF: ABNORMAL /HPF
NEUTROPHILS # BLD AUTO: 8.07 K/UL (ref 2–7.15)
NEUTROPHILS NFR BLD: 90.9 % (ref 44–72)
NITRITE UR QL STRIP.AUTO: NEGATIVE
NRBC # BLD AUTO: 0 K/UL
NRBC BLD AUTO-RTO: 0 /100 WBC
OPIATES UR QL SCN: NEGATIVE
OXYCODONE UR QL SCN: NEGATIVE
PCP UR QL SCN: NEGATIVE
PH UR STRIP.AUTO: 6.5 [PH]
PLATELET # BLD AUTO: 188 K/UL (ref 164–446)
PMV BLD AUTO: 10.4 FL (ref 9–12.9)
POTASSIUM SERPL-SCNC: 3.5 MMOL/L (ref 3.6–5.5)
PROPOXYPH UR QL SCN: NEGATIVE
PROT SERPL-MCNC: 6.5 G/DL (ref 6–8.2)
PROT UR QL STRIP: 30 MG/DL
PROTHROMBIN TIME: 13.4 SEC (ref 12–14.6)
RBC # BLD AUTO: 3.98 M/UL (ref 4.2–5.4)
RBC UR QL AUTO: NEGATIVE
SODIUM SERPL-SCNC: 140 MMOL/L (ref 135–145)
SP GR UR STRIP.AUTO: 1.02
TRIGL SERPL-MCNC: 67 MG/DL (ref 0–149)
WBC # BLD AUTO: 8.9 K/UL (ref 4.8–10.8)
WBC #/AREA URNS HPF: ABNORMAL /HPF

## 2017-04-29 PROCEDURE — 85610 PROTHROMBIN TIME: CPT

## 2017-04-29 PROCEDURE — 84703 CHORIONIC GONADOTROPIN ASSAY: CPT

## 2017-04-29 PROCEDURE — 700105 HCHG RX REV CODE 258: Performed by: EMERGENCY MEDICINE

## 2017-04-29 PROCEDURE — 70450 CT HEAD/BRAIN W/O DYE: CPT

## 2017-04-29 PROCEDURE — 81001 URINALYSIS AUTO W/SCOPE: CPT

## 2017-04-29 PROCEDURE — 82962 GLUCOSE BLOOD TEST: CPT

## 2017-04-29 PROCEDURE — 85730 THROMBOPLASTIN TIME PARTIAL: CPT

## 2017-04-29 PROCEDURE — 99291 CRITICAL CARE FIRST HOUR: CPT

## 2017-04-29 PROCEDURE — 700111 HCHG RX REV CODE 636 W/ 250 OVERRIDE (IP): Performed by: EMERGENCY MEDICINE

## 2017-04-29 PROCEDURE — 96375 TX/PRO/DX INJ NEW DRUG ADDON: CPT

## 2017-04-29 PROCEDURE — 304561 HCHG STAT O2

## 2017-04-29 PROCEDURE — 93005 ELECTROCARDIOGRAM TRACING: CPT | Performed by: EMERGENCY MEDICINE

## 2017-04-29 PROCEDURE — 99291 CRITICAL CARE FIRST HOUR: CPT | Mod: GC | Performed by: INTERNAL MEDICINE

## 2017-04-29 PROCEDURE — 80053 COMPREHEN METABOLIC PANEL: CPT

## 2017-04-29 PROCEDURE — 96374 THER/PROPH/DIAG INJ IV PUSH: CPT

## 2017-04-29 PROCEDURE — 85025 COMPLETE CBC W/AUTO DIFF WBC: CPT

## 2017-04-29 PROCEDURE — 96361 HYDRATE IV INFUSION ADD-ON: CPT

## 2017-04-29 PROCEDURE — 304562 HCHG STAT O2 MASK/CANNULA

## 2017-04-29 PROCEDURE — 84478 ASSAY OF TRIGLYCERIDES: CPT

## 2017-04-29 PROCEDURE — 770022 HCHG ROOM/CARE - ICU (200)

## 2017-04-29 PROCEDURE — 80307 DRUG TEST PRSMV CHEM ANLYZR: CPT

## 2017-04-29 RX ORDER — HEPARIN SODIUM 5000 [USP'U]/ML
5000 INJECTION, SOLUTION INTRAVENOUS; SUBCUTANEOUS EVERY 8 HOURS
Status: DISCONTINUED | OUTPATIENT
Start: 2017-04-30 | End: 2017-04-30 | Stop reason: HOSPADM

## 2017-04-29 RX ORDER — POLYETHYLENE GLYCOL 3350 17 G/17G
1 POWDER, FOR SOLUTION ORAL
Status: DISCONTINUED | OUTPATIENT
Start: 2017-04-29 | End: 2017-04-30 | Stop reason: HOSPADM

## 2017-04-29 RX ORDER — AMOXICILLIN 250 MG
2 CAPSULE ORAL 2 TIMES DAILY
Status: DISCONTINUED | OUTPATIENT
Start: 2017-04-30 | End: 2017-04-30 | Stop reason: HOSPADM

## 2017-04-29 RX ORDER — BISACODYL 10 MG
10 SUPPOSITORY, RECTAL RECTAL
Status: DISCONTINUED | OUTPATIENT
Start: 2017-04-29 | End: 2017-04-30 | Stop reason: HOSPADM

## 2017-04-29 RX ORDER — SODIUM CHLORIDE 9 MG/ML
1000 INJECTION, SOLUTION INTRAVENOUS ONCE
Status: COMPLETED | OUTPATIENT
Start: 2017-04-29 | End: 2017-04-29

## 2017-04-29 RX ORDER — LORAZEPAM 2 MG/ML
2 INJECTION INTRAMUSCULAR ONCE
Status: COMPLETED | OUTPATIENT
Start: 2017-04-29 | End: 2017-04-29

## 2017-04-29 RX ORDER — LORAZEPAM 2 MG/ML
INJECTION INTRAMUSCULAR
Status: DISPENSED
Start: 2017-04-29 | End: 2017-04-30

## 2017-04-29 RX ADMIN — DEXTROSE MONOHYDRATE 1000 MG: 50 INJECTION, SOLUTION INTRAVENOUS at 20:12

## 2017-04-29 RX ADMIN — LORAZEPAM 2 MG: 2 INJECTION INTRAMUSCULAR; INTRAVENOUS at 19:55

## 2017-04-29 RX ADMIN — LORAZEPAM 2 MG: 2 INJECTION INTRAMUSCULAR; INTRAVENOUS at 19:48

## 2017-04-29 RX ADMIN — SODIUM CHLORIDE 1000 ML: 9 INJECTION, SOLUTION INTRAVENOUS at 19:45

## 2017-04-29 RX ADMIN — FOSPHENYTOIN SODIUM 1000 MG PE: 50 INJECTION, SOLUTION INTRAMUSCULAR; INTRAVENOUS at 23:24

## 2017-04-29 NOTE — IP AVS SNAPSHOT
" Home Care Instructions                                                                                                                  Name:Rupa Yang  Medical Record Number:[id number]  CSN: [id number]    YOB: 1990   Age: 26 y.o.  Sex: female  HT:1.626 m (5' 4\") WT: 57.4 kg (126 lb 8.7 oz)          Admit Date: 4/29/2017     Discharge Date:   Today's Date: 4/30/2017  Attending Doctor:  No att. providers found                  Allergies:  Divalproex sodium; Risperidone; Sertraline hcl; and Tegretol            Discharge Instructions       Discharge Instructions    Discharged to home by car with friend. Discharged via wheelchair, hospital escort: Yes.  Special equipment needed: Not Applicable    Be sure to schedule a follow-up appointment with your primary care doctor or any specialists as instructed.     Discharge Plan:   Smoking Cessation Offered: Patient Refused  Pneumococcal Vaccine Given - only chart on this line when given: Given (See MAR)  Influenza Vaccine Indication: Patient Refuses    I understand that a diet low in cholesterol, fat, and sodium is recommended for good health. Unless I have been given specific instructions below for another diet, I accept this instruction as my diet prescription.       Special Instructions: None    · Is patient discharged on Warfarin / Coumadin?   No     · Is patient Post Blood Transfusion?  No    Depression / Suicide Risk    As you are discharged from this [facility] facility, it is important to learn how to keep safe from harming yourself.    Recognize the warning signs:  · Abrupt changes in personality, positive or negative- including increase in energy   · Giving away possessions  · Change in eating patterns- significant weight changes-  positive or negative  · Change in sleeping patterns- unable to sleep or sleeping all the time   · Unwillingness or inability to communicate  · Depression  · Unusual sadness, discouragement and loneliness  · Talk of " wanting to die  · Neglect of personal appearance   · Rebelliousness- reckless behavior  · Withdrawal from people/activities they love  · Confusion- inability to concentrate     If you or a loved one observes any of these behaviors or has concerns about self-harm, here's what you can do:  · Talk about it- your feelings and reasons for harming yourself  · Remove any means that you might use to hurt yourself (examples: pills, rope, extension cords, firearm)  · Get professional help from the community (Mental Health, Substance Abuse, psychological counseling)  · Do not be alone:Call your Safe Contact- someone whom you trust who will be there for you.  · Call your local CRISIS HOTLINE 465-8549 or 729-404-2787  · Call your local Children's Mobile Crisis Response Team [place] (457) 495-0348 or www.Master Route  · Call the toll free National Suicide Prevention Hotlines   · National Suicide Prevention Lifeline 317-081-MYVS (8051)  · [facility] Hope Line Network 800-SUICIDE (674-3953)      Make a follow up appointment with GI physician and Primary Care Physician.     Your appointments     May 05, 2017  8:40 AM   NEW TO YOU with Ray Mcnamara M.D.   [facility])    [address]  [address]  [facility] [address] [phone]                 Discharge Medication Instructions:    Below are the medications your physician expects you to take upon discharge:    Review all your home medications and newly ordered medications with your doctor and/or pharmacist. Follow medication instructions as directed by your doctor and/or pharmacist.    Please keep your medication list with you and share with your physician.               Medication List      START taking these medications        Instructions    Morning Afternoon Evening Bedtime    * levetiracetam 500 MG Tabs   Commonly known as:  KEPPRA        Doctor's comments:  500 mg BID for 1 week than take 250 mg BID for 1 week  than stop   Take 1 Tab by mouth 2 times a day for 7 days.   Dose:  500 mg                        * levetiracetam 250 MG tablet   Start taking on:  5/7/2017   Commonly known as:  KEPPRA        Take 1 Tab by mouth 2 times a day for 7 days.   Dose:  250 mg                        * Notice:  This list has 2 medication(s) that are the same as other medications prescribed for you. Read the directions carefully, and ask your doctor or other care provider to review them with you.      CONTINUE taking these medications        Instructions    Morning Afternoon Evening Bedtime    cyclobenzaprine 10 MG Tabs   Commonly known as:  FLEXERIL        Take 1 Tab by mouth 3 times a day as needed for Muscle Spasms.   Dose:  10 mg                        hydrocodone-acetaminophen 5-325 MG Tabs per tablet   Commonly known as:  NORCO        Take 1 Tab by mouth every four hours as needed.   Dose:  1 Tab                        MIDOL MAX ST MENSTRUAL FORMULA PO        Take 1 Tab by mouth as needed.   Dose:  1 Tab                        omeprazole 20 MG delayed-release capsule   Commonly known as:  PRILOSEC        Take 1 Cap by mouth every day.   Dose:  20 mg                        ondansetron 4 MG Tbdp   Commonly known as:  ZOFRAN ODT        Take 1 Tab by mouth every four hours as needed.   Dose:  4 mg                        sucralfate 1 GM Tabs   Commonly known as:  CARAFATE        Take 1 Tab by mouth 4 Times a Day,Before Meals and at Bedtime.   Dose:  1 g                          STOP taking these medications     nitrofurantoin macro crystals 100 MG Caps   Commonly known as:  MACRODANTIN                    Where to Get Your Medications      Information about where to get these medications is not yet available     ! Ask your nurse or doctor about these medications    - levetiracetam 250 MG tablet  - levetiracetam 500 MG Tabs            Instructions           Diet / Nutrition:    Follow any diet instructions given to you by your doctor or the  dietician, including how much salt (sodium) you are allowed each day.    If you are overweight, talk to your doctor about a weight reduction plan.    Activity:    Remain physically active following your doctor's instructions about exercise and activity.    Rest often.     Any time you become even a little tired or short of breath, SIT DOWN and rest.    Worsening Symptoms:    Report any of the following signs and symptoms to the doctor's office immediately:    *Pain of jaw, arm, or neck  *Chest pain not relieved by medication                               *Dizziness or loss of consciousness  *Difficulty breathing even when at rest   *More tired than usual                                       *Bleeding drainage or swelling of surgical site  *Swelling of feet, ankles, legs or stomach                 *Fever (>100ºF)  *Pink or blood tinged sputum  *Weight gain (3lbs/day or 5lbs /week)           *Shock from internal defibrillator (if applicable)  *Palpitations or irregular heartbeats                *Cool and/or numb extremities    Stroke Awareness    Common Risk Factors for Stroke include:    Age  Atrial Fibrillation  Carotid Artery Stenosis  Diabetes Mellitus  Excessive alcohol consumption  High blood pressure  Overweight   Physical inactivity  Smoking    Warning signs and symptoms of a stroke include:    *Sudden numbness or weakness of the face, arm or leg (especially on one side of the body).  *Sudden confusion, trouble speaking or understanding.  *Sudden trouble seeing in one or both eyes.  *Sudden trouble walking, dizziness, loss of balance or coordination.Sudden severe headache with no known cause.    It is very important to get treatment quickly when a stroke occurs. If you experience any of the above warning signs, call 911 immediately.                   Disclaimer         Quit Smoking / Tobacco Use:    I understand the use of any tobacco products increases my chance of suffering from future heart disease or  stroke and could cause other illnesses which may shorten my life. Quitting the use of tobacco products is the single most important thing I can do to improve my health. For further information on smoking / tobacco cessation call a Toll Free Quit Line at 1-457.475.1361 (*National Cancer [place]) or 1-701.160.3621 (American Lung Association) or you can access the web based program at www.lungusa.org.    [place] Tobacco Users Help Line:  (831) 694-1519       Toll Free: 1-115.422.9283  Quit Tobacco Program [facility] (253)316-3003    Crisis Hotline:    [place] Crisis Hotline:  1-901-SEDJOSW or 1-376.765.4421    [place] Crisis Hotline:    1-804.752.6779 or 635-616-1193    Discharge Survey:   Thank you for choosing [facility]. We hope we did everything we could to make your hospital stay a pleasant one. You may be receiving a phone survey and we would appreciate your time and participation in answering the questions. Your input is very valuable to us in our efforts to improve our service to our patients and their families.        My signature on this form indicates that:    1. I have reviewed and understand the above information.  2. My questions regarding this information have been answered to my satisfaction.  3. I have formulated a plan with my discharge nurse to obtain my prescribed medications for home.                  Disclaimer         __________________________________                     __________       ________                       Patient Signature                                                 Date                    Time

## 2017-04-29 NOTE — IP AVS SNAPSHOT
" <p align=\"LEFT\"><IMG SRC=\"//[email or web address]$/[email or web address]\" alt=\"Image\" WIDTH=\"50%\" HEIGHT=\"200\" BORDER=\"\"></p>                   Name:Rupa Yang  Medical Record Number:[id number]  CSN: [id number]    YOB: 1990   Age: 26 y.o.  Sex: female  HT:1.626 m (5' 4\") WT: 57.4 kg (126 lb 8.7 oz)          Admit Date: 4/29/2017     Discharge Date:   Today's Date: 4/30/2017  Attending Doctor:  No att. providers found                  Allergies:  Divalproex sodium; Risperidone; Sertraline hcl; and Tegretol          Your appointments     May 05, 2017  8:40 AM   NEW TO YOU with Ray Mcnamara M.D.   [facility]    [address]  [address]  [facility] [address]   [phone]                 Medication List      Take these Medications        Instructions    cyclobenzaprine 10 MG Tabs   Commonly known as:  FLEXERIL    Take 1 Tab by mouth 3 times a day as needed for Muscle Spasms.   Dose:  10 mg       hydrocodone-acetaminophen 5-325 MG Tabs per tablet   Commonly known as:  NORCO    Take 1 Tab by mouth every four hours as needed.   Dose:  1 Tab       * levetiracetam 500 MG Tabs   Commonly known as:  KEPPRA    Doctor's comments:  500 mg BID for 1 week than take 250 mg BID for 1 week than stop   Take 1 Tab by mouth 2 times a day for 7 days.   Dose:  500 mg       * levetiracetam 250 MG tablet   Start taking on:  5/7/2017   Commonly known as:  KEPPRA    Take 1 Tab by mouth 2 times a day for 7 days.   Dose:  250 mg       MIDOL MAX ST MENSTRUAL FORMULA PO    Take 1 Tab by mouth as needed.   Dose:  1 Tab       omeprazole 20 MG delayed-release capsule   Commonly known as:  PRILOSEC    Take 1 Cap by mouth every day.   Dose:  20 mg       ondansetron 4 MG Tbdp   Commonly known as:  ZOFRAN ODT    Take 1 Tab by mouth every four hours as needed.   Dose:  4 mg       sucralfate 1 GM Tabs   Commonly known as:  CARAFATE    Take 1 Tab by mouth 4 Times a Day,Before Meals and at Bedtime.  "   Dose:  1 g       * Notice:  This list has 2 medication(s) that are the same as other medications prescribed for you. Read the directions carefully, and ask your doctor or other care provider to review them with you.

## 2017-04-29 NOTE — IP AVS SNAPSHOT
Informed Trades Access Code: Activation code not generated  Current Informed Trades Status: Active    Theravaschart  A secure, online tool to manage your health information     [employer] Informed Trades® is a secure, online tool that connects you to your personalized health information from the privacy of your home -- day or night - making it very easy for you to manage your healthcare. Once the activation process is completed, you can even access your medical information using the Informed Trades cathleen, which is available for free in the Apple Cathleen store or Google Play store.     Informed Trades provides the following levels of access (as shown below):   My Chart Features   [facility] Primary Care Doctor [facility]  Specialists [facility]  Urgent  Care Non-[facility]  Primary Care  Doctor   Email your healthcare team securely and privately 24/7 X X X X   Manage appointments: schedule your next appointment; view details of past/upcoming appointments X      Request prescription refills. X      View recent personal medical records, including lab and immunizations X X X X   View health record, including health history, allergies, medications X X X X   Read reports about your outpatient visits, procedures, consult and ER notes X X X X   See your discharge summary, which is a recap of your hospital and/or ER visit that includes your diagnosis, lab results, and care plan. X X       How to register for Informed Trades:  1. Go to  [email or web address].  2. Click on the Sign Up Now box, which takes you to the New Member Sign Up page. You will need to provide the following information:  a. Enter your Informed Trades Access Code exactly as it appears at the top of this page. (You will not need to use this code after you’ve completed the sign-up process. If you do not sign up before the expiration date, you must request a new code.)   b. Enter your date of birth.   c. Enter your home email address.   d. Click Submit, and follow the next screen’s instructions.  3. Create a Informed Trades ID. This will  be your [employer] login ID and cannot be changed, so think of one that is secure and easy to remember.  4. Create a [employer] password. You can change your password at any time.  5. Enter your Password Reset Question and Answer. This can be used at a later time if you forget your password.   6. Enter your e-mail address. This allows you to receive e-mail notifications when new information is available in Temptster.  7. Click Sign Up. You can now view your health information.    For assistance activating your [employer] account, call [phone]

## 2017-04-29 NOTE — IP AVS SNAPSHOT
4/30/2017    Rupa Yang  [address]  [place] [address]    Dear Rupa:    [facility] wants to ensure your discharge home is safe and you or your loved ones have had all of your questions answered regarding your care after you leave the hospital.    Below is a list of resources and contact information should you have any questions regarding your hospital stay, follow-up instructions, or active medical symptoms.    Questions or Concerns Regarding… Contact   Medical Questions Related to Your Discharge  (7 days a week, 8am-5pm) Contact a Nurse Care Coordinator   [phone]   Medical Questions Not Related to Your Discharge  (24 hours a day / 7 days a week)  Contact the Nurse Health Line   [phone]    Medications or Discharge Instructions Refer to your discharge packet   or contact your [facility] Primary Care Provider   [phone]   Follow-up Appointment(s) Schedule your appointment via [email or web address]   or contact Scheduling [phone]   Billing Review your statement via [email or web address]  or contact Billing [phone]   Medical Records Review your records via [email or web address]   or contact Medical Records [phone]     You may receive a telephone call within two days of discharge. This call is to make certain you understand your discharge instructions and have the opportunity to have any questions answered. You can also easily access your medical information, test results and upcoming appointments via the PureHistory online health management tool. You can learn more and sign up at [email or web address]. For assistance setting up your [email or web address] account, please call [phone].    Once again, we want to ensure your discharge home is safe and that you have a clear understanding of any next steps in your care. If you have any questions or concerns, please do not hesitate to contact us, we are here for you. Thank you for choosing [facility] for your healthcare needs.    Sincerely,    Your [facility] Healthcare Team

## 2017-04-30 VITALS
BODY MASS INDEX: 21.6 KG/M2 | WEIGHT: 126.54 LBS | SYSTOLIC BLOOD PRESSURE: 119 MMHG | HEIGHT: 64 IN | DIASTOLIC BLOOD PRESSURE: 74 MMHG | HEART RATE: 55 BPM | OXYGEN SATURATION: 97 % | RESPIRATION RATE: 14 BRPM

## 2017-04-30 LAB
ANION GAP SERPL CALC-SCNC: 12 MMOL/L (ref 0–11.9)
BUN SERPL-MCNC: 5 MG/DL (ref 8–22)
CALCIUM SERPL-MCNC: 8.3 MG/DL (ref 8.5–10.5)
CHLORIDE SERPL-SCNC: 106 MMOL/L (ref 96–112)
CO2 SERPL-SCNC: 20 MMOL/L (ref 20–33)
CREAT SERPL-MCNC: 0.37 MG/DL (ref 0.5–1.4)
EKG IMPRESSION: NORMAL
GFR SERPL CREATININE-BSD FRML MDRD: >60 ML/MIN/1.73 M 2
GLUCOSE SERPL-MCNC: 93 MG/DL (ref 65–99)
MAGNESIUM SERPL-MCNC: 1.7 MG/DL (ref 1.5–2.5)
POTASSIUM SERPL-SCNC: 3.4 MMOL/L (ref 3.6–5.5)
PROLACTIN SERPL-MCNC: 1.96 NG/ML (ref 2.8–26)
SODIUM SERPL-SCNC: 138 MMOL/L (ref 135–145)

## 2017-04-30 PROCEDURE — 90471 IMMUNIZATION ADMIN: CPT

## 2017-04-30 PROCEDURE — 700111 HCHG RX REV CODE 636 W/ 250 OVERRIDE (IP): Performed by: INTERNAL MEDICINE

## 2017-04-30 PROCEDURE — 90732 PPSV23 VACC 2 YRS+ SUBQ/IM: CPT | Performed by: INTERNAL MEDICINE

## 2017-04-30 PROCEDURE — 83735 ASSAY OF MAGNESIUM: CPT

## 2017-04-30 PROCEDURE — 700105 HCHG RX REV CODE 258: Performed by: STUDENT IN AN ORGANIZED HEALTH CARE EDUCATION/TRAINING PROGRAM

## 2017-04-30 PROCEDURE — 84146 ASSAY OF PROLACTIN: CPT

## 2017-04-30 PROCEDURE — 700111 HCHG RX REV CODE 636 W/ 250 OVERRIDE (IP): Performed by: STUDENT IN AN ORGANIZED HEALTH CARE EDUCATION/TRAINING PROGRAM

## 2017-04-30 PROCEDURE — 700111 HCHG RX REV CODE 636 W/ 250 OVERRIDE (IP)

## 2017-04-30 PROCEDURE — 700102 HCHG RX REV CODE 250 W/ 637 OVERRIDE(OP): Performed by: INTERNAL MEDICINE

## 2017-04-30 PROCEDURE — A9270 NON-COVERED ITEM OR SERVICE: HCPCS | Performed by: INTERNAL MEDICINE

## 2017-04-30 PROCEDURE — 80048 BASIC METABOLIC PNL TOTAL CA: CPT

## 2017-04-30 PROCEDURE — 99233 SBSQ HOSP IP/OBS HIGH 50: CPT | Performed by: INTERNAL MEDICINE

## 2017-04-30 RX ORDER — HYDROCODONE BITARTRATE AND ACETAMINOPHEN 5; 325 MG/1; MG/1
1 TABLET ORAL EVERY 4 HOURS PRN
Status: DISCONTINUED | OUTPATIENT
Start: 2017-04-30 | End: 2017-04-30 | Stop reason: HOSPADM

## 2017-04-30 RX ORDER — LEVETIRACETAM 500 MG/1
500 TABLET ORAL 2 TIMES DAILY
Qty: 14 TAB | Refills: 0 | Status: SHIPPED | OUTPATIENT
Start: 2017-04-30 | End: 2017-05-07

## 2017-04-30 RX ORDER — ONDANSETRON 2 MG/ML
4 INJECTION INTRAMUSCULAR; INTRAVENOUS EVERY 4 HOURS PRN
Status: DISCONTINUED | OUTPATIENT
Start: 2017-04-30 | End: 2017-04-30 | Stop reason: HOSPADM

## 2017-04-30 RX ORDER — POTASSIUM CHLORIDE 7.45 MG/ML
10 INJECTION INTRAVENOUS
Status: COMPLETED | OUTPATIENT
Start: 2017-04-30 | End: 2017-04-30

## 2017-04-30 RX ORDER — MORPHINE SULFATE 4 MG/ML
2 INJECTION, SOLUTION INTRAMUSCULAR; INTRAVENOUS ONCE
Status: COMPLETED | OUTPATIENT
Start: 2017-04-30 | End: 2017-04-30

## 2017-04-30 RX ORDER — SODIUM CHLORIDE 9 MG/ML
1000 INJECTION, SOLUTION INTRAVENOUS ONCE
Status: COMPLETED | OUTPATIENT
Start: 2017-04-30 | End: 2017-04-30

## 2017-04-30 RX ORDER — OMEPRAZOLE 20 MG/1
20 CAPSULE, DELAYED RELEASE ORAL DAILY
Status: DISCONTINUED | OUTPATIENT
Start: 2017-04-30 | End: 2017-04-30 | Stop reason: HOSPADM

## 2017-04-30 RX ORDER — LEVETIRACETAM 250 MG/1
250 TABLET ORAL 2 TIMES DAILY
Qty: 14 TAB | Refills: 0 | Status: SHIPPED | OUTPATIENT
Start: 2017-05-07 | End: 2017-05-14

## 2017-04-30 RX ORDER — ONDANSETRON 2 MG/ML
INJECTION INTRAMUSCULAR; INTRAVENOUS
Status: COMPLETED
Start: 2017-04-30 | End: 2017-04-30

## 2017-04-30 RX ORDER — MAGNESIUM SULFATE HEPTAHYDRATE 40 MG/ML
2 INJECTION, SOLUTION INTRAVENOUS ONCE
Status: COMPLETED | OUTPATIENT
Start: 2017-04-30 | End: 2017-04-30

## 2017-04-30 RX ORDER — SUCRALFATE 1 G/1
1 TABLET ORAL
Status: DISCONTINUED | OUTPATIENT
Start: 2017-04-30 | End: 2017-04-30 | Stop reason: HOSPADM

## 2017-04-30 RX ORDER — LORAZEPAM 2 MG/ML
2 INJECTION INTRAMUSCULAR
Status: DISCONTINUED | OUTPATIENT
Start: 2017-04-30 | End: 2017-04-30 | Stop reason: HOSPADM

## 2017-04-30 RX ADMIN — HEPARIN SODIUM 5000 UNITS: 5000 INJECTION, SOLUTION INTRAVENOUS; SUBCUTANEOUS at 13:41

## 2017-04-30 RX ADMIN — DEXTROSE MONOHYDRATE 500 MG: 50 INJECTION, SOLUTION INTRAVENOUS at 10:53

## 2017-04-30 RX ADMIN — HEPARIN SODIUM 5000 UNITS: 5000 INJECTION, SOLUTION INTRAVENOUS; SUBCUTANEOUS at 05:23

## 2017-04-30 RX ADMIN — FAMOTIDINE 20 MG: 10 INJECTION INTRAVENOUS at 11:18

## 2017-04-30 RX ADMIN — POTASSIUM CHLORIDE 10 MEQ: 7.46 INJECTION, SOLUTION INTRAVENOUS at 12:04

## 2017-04-30 RX ADMIN — POTASSIUM CHLORIDE 10 MEQ: 7.46 INJECTION, SOLUTION INTRAVENOUS at 09:47

## 2017-04-30 RX ADMIN — MAGNESIUM SULFATE IN WATER 2 G: 40 INJECTION, SOLUTION INTRAVENOUS at 08:43

## 2017-04-30 RX ADMIN — POTASSIUM CHLORIDE 10 MEQ: 7.46 INJECTION, SOLUTION INTRAVENOUS at 08:39

## 2017-04-30 RX ADMIN — POTASSIUM CHLORIDE 10 MEQ: 7.46 INJECTION, SOLUTION INTRAVENOUS at 10:55

## 2017-04-30 RX ADMIN — SODIUM CHLORIDE 1000 ML: 9 INJECTION, SOLUTION INTRAVENOUS at 00:03

## 2017-04-30 RX ADMIN — ONDANSETRON 4 MG: 2 INJECTION, SOLUTION INTRAMUSCULAR; INTRAVENOUS at 10:59

## 2017-04-30 RX ADMIN — PNEUMOCOCCAL VACCINE POLYVALENT 25 MCG
25; 25; 25; 25; 25; 25; 25; 25; 25; 25; 25; 25; 25; 25; 25; 25; 25; 25; 25; 25; 25; 25; 25 INJECTION, SOLUTION INTRAMUSCULAR; SUBCUTANEOUS at 13:24

## 2017-04-30 RX ADMIN — ONDANSETRON 4 MG: 2 INJECTION INTRAMUSCULAR; INTRAVENOUS at 10:59

## 2017-04-30 RX ADMIN — MORPHINE SULFATE 2 MG: 4 INJECTION INTRAVENOUS at 11:11

## 2017-04-30 ASSESSMENT — PAIN SCALES - GENERAL
PAINLEVEL_OUTOF10: 8
PAINLEVEL_OUTOF10: 8

## 2017-04-30 ASSESSMENT — LIFESTYLE VARIABLES
ALCOHOL_USE: NO
EVER_SMOKED: YES

## 2017-04-30 NOTE — ED NOTES
Back time on my neuro. Pt highly medicated at the time as well with 4mg Ativan and 9mg Versed.    Pt is more alert currently than on arrival opens eyes to commands and can answer all A&O questions, but is still drowsy

## 2017-04-30 NOTE — PROGRESS NOTES
Pulmonary Critical Care Progress Note      Date of service: 4/30/2017    Chief Complaint: Seizure    History of Present Illness: Patient is a 27 y/o F with a PMHx significant for Seizure-like activity, Chronic abdominal pain, and hx of substance abuse who presented via EMS in an acute seizure-like state after being transported from her home. She was unable to provide any history secondary to being sedated s/p  Ativan 2mg x2, Keppra 1000 mg. In the ED, initial pertinent work-up revealed T 98, /74, RR 33, HR  83, O2% 95, WBC 8.9, Na/K 140/3.5, BUN/Crt 9/.44, ETOH .01, Utox (+) Benzo/THC. EDP contacted tele-neurology who advised beginning Keppra 500mg BID, loading with Fosphenytoin and admitting the the ICU for anticipated EEG.    ROS:  Respiratory: negative, Cardiac: negative, GI: positive nausea, positive vomiting and positive abdominal pain.  All other systems negative.    Interval Events:  24 hour interval history reviewed   Alert and oriented overnight  Take off Keppra  Given Potassium and mag  Takes home med [place] due to chronic abdominal pain    PFSH:  No change.    Respiratory:  Pulse Oximetry: 98 % 2L NC  Chest Tube Drains:  Exam: Clear to auscultation bilaterally, breathing comfortably on NC, speaking full sentences  ImagingAvailable data reviewed   Chest X ray (personal interpretation) indicates: none available today         Invalid input(s): [id number]    HemoDynamics:  Pulse: 62, Heart Rate (Monitored): 62  Blood Pressure: 119/74 mmHg, NIBP: (!) 92/55 mmHg     Exam: Regular rate and rhythm, no murmur, rubs, or gallops. Good capillary refill, no edema  Imaging: Available data reviewed    Neuro:  GCS Total Ayse Coma Score: 14  Exam: Alert and oriented x4, no focal deficits  Imaging: Available data reviewed    Fluids:  Intake/Output       04/28/17 0700 - 04/29/17 0659 (Not Admitted) 04/29/17 0700 - 04/30/17 0659 04/30/17 0700 - 05/01/17 0659      [id number] 6732-1450 Total 3727-5872 1446-8157  Total 2768-1642 7218-4156 Total       Intake    I.V.  --  -- --  --  2000  --  -- --    IV Volume (NS bolus) -- -- -- -- 2000 -- -- --    Total Intake -- -- -- -- 2000 -- -- --       Output    Urine  --  -- --  --   1000  --  -- --    Indwelling Cathether -- -- -- --  1000 -- -- --    Total Output -- -- -- -- 1000 1000 -- -- --       Net I/O     -- -- -- -- 1000 1000 -- -- --        Weight: 57.4 kg (126 lb 8.7 oz)  Recent Labs      1715   SODIUM  140  138   POTASSIUM  3.5*  3.4*   CHLORIDE  106  106   CO2  22  20   BUN  9  5*   CREATININE  0.44*  0.37*   MAGNESIUM   --   1.7   CALCIUM  8.7  8.3*       GI/Nutrition:  Exam: Mildly distended, mild tenderness worse on suprapubic, no rebound or guarding. Normal bowel sounds  Imaging: Available data reviewed  taking PO     Liver Function  Recent Labs      17   0515   ALTSGPT  9   --    ASTSGOT  11*   --    ALKPHOSPHAT  63   --    TBILIRUBIN  0.4   --    GLUCOSE  114*  93     Heme:  Recent Labs      17   RBC  3.98*   HEMOGLOBIN  13.1   HEMATOCRIT  37.9   PLATELETCT  188   PROTHROMBTM  13.4   APTT  25.5   INR  0.99     Infectious Disease:  Monitored Temp  Av.2 °C (98.9 °F)  Min: 36.9 °C (98.4 °F)  Max: 37.5 °C (99.5 °F)  Micro: reviewed   Afebrile  Recent Labs      17   WBC  8.9   NEUTSPOLYS  90.90*   LYMPHOCYTES  6.70*   MONOCYTES  1.90   EOSINOPHILS  0.00   BASOPHILS  0.10   ASTSGOT  11*   ALTSGPT  9   ALKPHOSPHAT  63   TBILIRUBIN  0.4     Current Facility-Administered Medications   Medication Dose Frequency Provider Last Rate Last Dose   • lorazepam (ATIVAN) injection 2 mg  2 mg Q10 MIN PRN Manuel Cash M.D.       • magnesium sulfate IVPB premix 2 g  2 g Once Tyree Red M.D.       • potassium chloride in water (KCL) ivpb 10 mEq  10 mEq Q HOUR Tyree Red M.D.       • senna-docusate (PERICOLACE or SENOKOT S) 8.6-50 MG per tablet 2 Tab  2 Tab BID Kiesha  CR Brown M.D.        And   • polyethylene glycol/lytes (MIRALAX) PACKET 1 Packet  1 Packet QDAY PRN Kiesha Brown M.D.        And   • magnesium hydroxide (MILK OF MAGNESIA) suspension 30 mL  30 mL QDAY PRN Kiesha Brown M.D.        And   • bisacodyl (DULCOLAX) suppository 10 mg  10 mg QDAY PRN Kiesha Brown M.D.       • heparin injection 5,000 Units  5,000 Units Q8HRS Kiesha Brown M.D.   5,000 Units at 04/30/17 0523   • levetiracetam (KEPPRA) 500 mg in D5W 100 mL IVPB  500 mg Q12HRS Kiesha Brown M.D.         Last reviewed on 4/24/2017 11:01 AM by Heidy Maier R.N.    Quality  Measures:  Labs reviewed, Radiology images reviewed and Medications reviewed  Barrett catheter: No Barrett      DVT Prophylaxis: Heparin  DVT prophylaxis - mechanical: Not indicated at this time, ambulatory  Ulcer prophylaxis: Not indicated    Assessed for rehab: Patient returned to prior level of function, rehabilitation not indicated at this time    Assessment/Plan:  Sz vs pseudoseizure - h/o same in past related to abdominal migraine pain              - Neuro rec's noted; Keppra, fosphenytoin    Abdominal migraines   - resume home norco prn, pepcid, carafate   - f/u with GI outpatient, consider pain consult outpatient as well  HypoK/Mg - repletion  Prophylaxis, diet    Ok to transfer patient out of ICU today. [facility] will sign off at transfer. Please call with questions.    Discussed patient condition and risk of morbidity and/or mortality with patient, RN, RT and Pharmacy.      Remy MORALES (Scribe), am scribing for, and in the presence of, Jeremy Gonda M.D.     Electronically signed by: Remy Tierney (Laraibe), 4/30/2017    I, Jeremy Gonda M.D.  personally performed the services described in this documentation, as scribed by Remy Tierney in my presence, and it is both accurate and complete.

## 2017-04-30 NOTE — PROGRESS NOTES
Dr. Red at bedside and reviewed discharge medications. Pt has all belongings with her, family is at bedside to take her home via car. VSS prior to discharge. All lines removed. Pt instructed to make follow up appointment with GI physician; pt has scheduled appointment for PCP.

## 2017-04-30 NOTE — DISCHARGE SUMMARY
[facility] Internal Medicine Discharge Summary      Admit Date:  4/29/2017       Discharge Date:   4/30/2017    Service:   [facility] Internal Medicine [facility] Team  Attending Physician(s):   Dr. Gonda       Senior Resident(s):   Dr. Red    Primary Diagnosis:   Pseudoseizure  Chronic abdominal pain  Hypokalemia     Secondary Diagnoses:                Chronic abdominal pain  Substance abuse    Hospital Summary (Brief Narrative):       Pt admitted with seizure like activity, had previous admission with similar s&s, last time was dx with Pseudoseizures (Video EEG 1/2015), she said these events happen when her chronic abdominal pain gets worse and she will be fully aware of what's going on but can not interact. Neurology saw the pt and recommended no need for EEG, will recommend Keppra 500 mg BID for 1 week than 250 mg BID for 1 week than stop.   Her chronic abdominal pain has been worked up as outpatient and was dx per Pt (Abdominal Migraines / Endometriosis), will cont her home med's and recommend follow up with her PCP/ GI    Consultants:     Neurology     Procedures:        None    Imaging/ Testing:      CT-HEAD W/O   Final Result         NO ACUTE ABNORMALITIES ARE NOTED ON CT SCAN OF THE HEAD.             Discharge Medications:         Medication Reconciliation: Completed     Medication List      START taking these medications       Instructions    * levetiracetam 500 MG Tabs   Commonly known as:  KEPPRA    Doctor's comments:  500 mg BID for 1 week than take 250 mg BID for 1 week than stop   Take 1 Tab by mouth 2 times a day for 7 days.   Dose:  500 mg       * levetiracetam 250 MG tablet   Start taking on:  5/7/2017   Commonly known as:  KEPPRA    Take 1 Tab by mouth 2 times a day for 7 days.   Dose:  250 mg       * Notice:  This list has 2 medication(s) that are the same as other medications prescribed for you. Read the directions carefully, and ask your doctor or other care provider to review them with you.       CONTINUE taking these medications       Instructions    alprazolam 0.25 MG Tabs   Commonly known as:  XANAX    Take 1 Tab by mouth at bedtime as needed for Sleep.   Dose:  0.25 mg       cyclobenzaprine 10 MG Tabs   Commonly known as:  FLEXERIL    Take 1 Tab by mouth 3 times a day as needed for Muscle Spasms.   Dose:  10 mg       hydrocodone-acetaminophen 5-325 MG Tabs per tablet   Commonly known as:  NORCO    Take 1 Tab by mouth every four hours as needed.   Dose:  1 Tab       MIDOL MAX ST MENSTRUAL FORMULA PO    Take 1 Tab by mouth as needed.   Dose:  1 Tab       omeprazole 20 MG delayed-release capsule   Commonly known as:  PRILOSEC    Take 1 Cap by mouth every day.   Dose:  20 mg       ondansetron 4 MG Tbdp   Commonly known as:  ZOFRAN ODT    Take 1 Tab by mouth every four hours as needed.   Dose:  4 mg       sucralfate 1 GM Tabs   Commonly known as:  CARAFATE    Take 1 Tab by mouth 4 Times a Day,Before Meals and at Bedtime.   Dose:  1 g         STOP taking these medications          nitrofurantoin macro crystals 100 MG Caps   Commonly known as:  MACRODANTIN           Disposition:   home    Diet:   Regular healthy diet    Activity:   As tolerated     Instructions:       The patient was instructed to return to the ER in the event of worsening symptoms. I have counseled the patient on the importance of compliance and the patient has agreed to proceed with all medical recommendations and follow up plan indicated above.   The patient understands that all medications come with benefits and risks. Risks may include permanent injury or death and these risks can be minimized with close reassessment and monitoring.        Primary Care Provider:     Discharge summary faxed to primary care provider:  Deferred  Copy of discharge summary given to the patient: Completed    Follow up appointment details :      Follow up with PCP and gastroenterologist      Pending Studies:        none    Time spent on discharge day patient  visit, preparing discharge paperwork and arranging for patient follow up.      Discharge Time (Minutes) :    40 min's

## 2017-04-30 NOTE — ED NOTES
Pt laying in gurney on left side, responds to verbal commands.  Pt is still sleepy and falls back asleep after speaking to me vitals stable woth HR 94 /59 and O2 at 96% on 2L nasal cannula.      Pt able to open eyes and look towards me when asked. And can state her name when asked.

## 2017-04-30 NOTE — PROGRESS NOTES
Pt had witnessed seizure-like activity intermittently for 3 minutes. Extremities appear to be voluntarily amadou/shaking. Pt is not post ictal. More alert after seizure activity than before. Oriented x3, asking for her cell phone. Dr. Choe updated

## 2017-04-30 NOTE — ED NOTES
Pt laying on RIGHT side curled in fetal position.  Pt does wake up with verbal stimuli but goes back to sleep.

## 2017-04-30 NOTE — DISCHARGE PLANNING
MSW received call from bedside RN in ER stating pt is confused about where her children were at. Additionally, the [facility] crew had noted to the RN that the house was dirty and they were unsure who the children were being left with. MSW contacted [facility] dispatch and spoke with Will who confirmed the address and crew. Will to have crew call MSW. The crew on scene gave MSW a call and stated that the pt's apartment was unkept and dirty. They were unsure about who the children were being left to as well. MSW called [place] [employer] who did a welfare check on pt's address at [address] [place] [address]. Officer Rebeca of [facility] stated that pt's 3 children are being looked after by pt's ex- Danilo Hoang (who they share one child with). Officer Rebeca did stated the the house was messy, by the children did have food and no concerns for neglect or abuse. MSW did an informational report with [facility] CPS worker [place] based [facility] and [facility] findings of pt's living situation.     [facility]. Will remain available for support.

## 2017-04-30 NOTE — ED NOTES
CT reported that pt had 2 seizures at CT and 1 on the way back.  CT did not call they said that they were so mild that they held her for a bit and she stopped. ERP notified and  Verbal order of 1mg if needed for another seizure.      Hospitalist SHEN sinha

## 2017-04-30 NOTE — CARE PLAN
Problem: Safety  Goal: Will remain free from injury  Outcome: PROGRESSING AS EXPECTED  Pt will remain free from injury  Intervention: Collaborate with Interdisciplinary Team for safe transfer and mobilization techniques  Collaborated   Intervention: Educate patient and significant other/support system about adaptive mobility strategies and safe transfers  Educated       Problem: Skin Integrity  Goal: Risk for impaired skin integrity will decrease  Outcome: PROGRESSING AS EXPECTED  Skin integrity will be maintained   Intervention: Assess risk factors for impaired skin integrity and/or pressure ulcers  Assessed   Intervention: Implement precautions to protect skin integrity in collaboration with the interdisciplinary team  In place

## 2017-04-30 NOTE — ED NOTES
Pt laying on left side breathing normal with visible rise and fall of chest, still responding to verbal commands but drowsy.

## 2017-04-30 NOTE — PROGRESS NOTES
Pt nauseous after drinking 1 cup of juice. Dr. Red updated and zofran given. Pt then having shaking/convulsing like activity which lasted ~30 seconds. VSS during this period. Dr. Red notified. Afterwards, pt complaining of abdominal pain 8/10 and reports that she won't be able to hold down any PO medication at this time. Dr. Red updated and IV pain medication given for pain of 8/10. IV pepcid also ordered per MD (see EPIC). Hold PO medications at this time per MD. Will implement orders and continue to monitor.

## 2017-04-30 NOTE — ED NOTES
RICU nurses bedside for report as well as [facility] Doctor and pharm.  Possible lower BP due to medication given.  When pt was hooked to ICU monitor BP came up to 199/74.  Pt talking to nurses when she left but still drowsy and sluggish.

## 2017-04-30 NOTE — PROGRESS NOTES
I was contacted by ED about this 26 year old female with history of epilepsy vs pseudoseizures who presents tonight with multiple seizures which are described as focal onset with GTCSz. She has been givne ativan as well as 1gm Keppra.  I have recommended admit to ICU as well as loading with fosphenyotin 18 mEq/kg.

## 2017-04-30 NOTE — H&P
"       [facility] Admitting History and Physical    Name Rupa Yang     1990   Age/Sex 26 y.o. female   MRN [id number]   Code Status FULL     After 5PM or if no immediate response to page, please call for cross-coverage  Attending/Team: Dr. Dunaway Call [phone] to page   1st Call - Day Intern (R1):   GINO 2nd Call - Day Sr. Resident (R2/R3):   Dr. Red       Chief Complaint:  Seizure    HPI:  Patient is a 25 y/o F with a PMHx significant for Seizure-like activity, Chronic abdominal pain, and hx of substance abuse who presented via EMS in an acute seizure-like state after being transported from her home. She was unable to provide any history secondary to being sedated s/p  Ativan 2mg x2, Keppra 1000 mg. In the ED, initial pertinent work-up revealed T 98, /74, RR 33, HR  83, O2% 95, WBC 8.9, Na/K 140/3.5, BUN/Crt 9/.44, ETOH .01, Utox (+) Benzo/THC. EDP contacted tele-neurology who advised beginning Keppra 500mg BID, loading with Fosphenytoin and admitting the the ICU for anticipated EEG.      Review of Systems   Unable to perform ROS: patient nonverbal             Past Medical History:   Past Medical History   Diagnosis Date   • Scoliosis      dx age 10, chirpractor told severe curvature, left leg shorter.    • Eczema      arms, neck, mild currently   • Substance abuse      meth, coke, heroin, marijuana stopped after parents OD when she age 12.    • Gastroenteritis    • Arrhythmia      \"it feels like palpitations\"   • Heart burn    • Indigestion    • Other specified disorder of intestines      diarrhea   • Dental disorder      \"my teeth are completely dead. Exposed nerves\"   • Pain 02-24-15     chronic back, abd., 2/10       Past Surgical History:  Past Surgical History   Procedure Laterality Date   • Primary c section  2013     Performed by Haider Garcia M.D. at LABOR AND DELIVERY   • Judy by laparoscopy  3/3/2015     Procedure: JUDY BY LAPAROSCOPY;  Surgeon: Aftab Churchill, " "M.D.;  Location: SURGERY [facility];  Service:        Current Outpatient Medications:  Home Medications     **Home medications have not yet been reviewed for this encounter**          Medication Allergy/Sensitivities:  Allergies   Allergen Reactions   • Divalproex Sodium Hives and Rash         • Risperidone Hives and Rash         • Sertraline Hcl [Zoloft] Hives and Rash         • Tegretol [Carbamazepine] Hives and Rash               Family History:  Family History   Problem Relation Age of Onset   • Alcohol/Drug Mother      drugs   • Diabetes Mother      NIDDM   • Alcohol/Drug Father      drugs   • Alcohol/Drug Brother    • Hypertension Maternal Aunt    • Diabetes Maternal Aunt      IDDM   • Diabetes Maternal Grandmother      IDDM   • Diabetes Maternal Grandfather      IDDM   • Cancer Paternal Grandmother      breast   • Hypertension Paternal Grandmother        Social History:  Social History     Social History   • Marital Status:      Spouse Name: N/A   • Number of Children: N/A   • Years of Education: N/A     Occupational History   • Not on file.     Social History Main Topics   • Smoking status: Current Every Day Smoker -- 0.50 packs/day     Types: Cigarettes   • Smokeless tobacco: Never Used   • Alcohol Use: No   • Drug Use: Not on file      Comment: in past meth, cocaine, weed at age 12 last used at age 13   • Sexual Activity:     Partners: Male      Comment: planned pregnancy FOB involved     Other Topics Concern   • Not on file     Social History Narrative     Living situation: With  and children      Physical Exam     Filed Vitals:    04/29/17 2330 04/30/17 0001 04/30/17 0010 04/30/17 0030   BP:   119/74    Pulse: 83 85 75 83   Resp: 18   33   Height:       Weight:       SpO2: 97% 97%  95%     Body mass index is 19.73 kg/(m^2).  /74 mmHg  Pulse 83  Resp 33  Ht 1.626 m (5' 4\")  Wt 52.164 kg (115 lb)  BMI 19.73 kg/m2  SpO2 95%  O2 therapy: Pulse Oximetry: 95 %, O2 (LPM): 2, O2 " Delivery: Nasal Cannula    Physical Exam   Constitutional: She is well-developed, well-nourished, and in no distress. No distress.   HENT:   Head: Normocephalic and atraumatic.   Eyes: Pupils are equal, round, and reactive to light.   Cardiovascular: Normal rate and regular rhythm.    No murmur heard.  Pulmonary/Chest: Breath sounds normal. No respiratory distress. She has no wheezes. She exhibits no tenderness.   Abdominal: Soft. Bowel sounds are normal. She exhibits no distension. There is no tenderness.   Musculoskeletal: She exhibits no edema or tenderness.   Neurological:   GCS 11 (E3, V2, M6)   Skin: Skin is warm and dry. No rash noted. She is not diaphoretic. No erythema.             Data Review       Old Records Request:   Completed  Current Records review and summary: Completed    Lab Data Review:  Recent Results (from the past 24 hour(s))   CBC WITH DIFFERENTIAL    Collection Time: 04/29/17  7:55 PM   Result Value Ref Range    WBC 8.9 4.8 - 10.8 K/uL    RBC 3.98 (L) 4.20 - 5.40 M/uL    Hemoglobin 13.1 12.0 - 16.0 g/dL    Hematocrit 37.9 37.0 - 47.0 %    MCV 95.2 81.4 - 97.8 fL    MCH 32.9 27.0 - 33.0 pg    MCHC 34.6 33.6 - 35.0 g/dL    RDW 43.8 35.9 - 50.0 fL    Platelet Count 188 164 - 446 K/uL    MPV 10.4 9.0 - 12.9 fL    Neutrophils-Polys 90.90 (H) 44.00 - 72.00 %    Lymphocytes 6.70 (L) 22.00 - 41.00 %    Monocytes 1.90 0.00 - 13.40 %    Eosinophils 0.00 0.00 - 6.90 %    Basophils 0.10 0.00 - 1.80 %    Immature Granulocytes 0.40 0.00 - 0.90 %    Nucleated RBC 0.00 /100 WBC    Neutrophils (Absolute) 8.07 (H) 2.00 - 7.15 K/uL    Lymphs (Absolute) 0.60 (L) 1.00 - 4.80 K/uL    Monos (Absolute) 0.17 0.00 - 0.85 K/uL    Eos (Absolute) 0.00 0.00 - 0.51 K/uL    Baso (Absolute) 0.01 0.00 - 0.12 K/uL    Immature Granulocytes (abs) 0.04 0.00 - 0.11 K/uL    NRBC (Absolute) 0.00 K/uL   COMP METABOLIC PANEL    Collection Time: 04/29/17  7:55 PM   Result Value Ref Range    Sodium 140 135 - 145 mmol/L    Potassium 3.5  (L) 3.6 - 5.5 mmol/L    Chloride 106 96 - 112 mmol/L    Co2 22 20 - 33 mmol/L    Anion Gap 12.0 (H) 0.0 - 11.9    Glucose 114 (H) 65 - 99 mg/dL    Bun 9 8 - 22 mg/dL    Creatinine 0.44 (L) 0.50 - 1.40 mg/dL    Calcium 8.7 8.5 - 10.5 mg/dL    AST(SGOT) 11 (L) 12 - 45 U/L    ALT(SGPT) 9 2 - 50 U/L    Alkaline Phosphatase 63 30 - 99 U/L    Total Bilirubin 0.4 0.1 - 1.5 mg/dL    Albumin 3.9 3.2 - 4.9 g/dL    Total Protein 6.5 6.0 - 8.2 g/dL    Globulin 2.6 1.9 - 3.5 g/dL    A-G Ratio 1.5 g/dL   APTT    Collection Time: 17  7:55 PM   Result Value Ref Range    APTT 25.5 24.7 - 36.0 sec   PROTHROMBIN TIME    Collection Time: 17  7:55 PM   Result Value Ref Range    PT 13.4 12.0 - 14.6 sec    INR 0.99 0.87 - 1.13   HCG Qual Serum    Collection Time: 17  7:55 PM   Result Value Ref Range    Beta-Hcg Qualitative Serum Negative Negative   DIAGNOSTIC ALCOHOL    Collection Time: 17  7:55 PM   Result Value Ref Range    Diagnostic Alcohol 0.01 (H) 0.00 g/dL   TRIGLYCERIDE    Collection Time: 17  7:55 PM   Result Value Ref Range    Triglycerides 67 0 - 149 mg/dL   ESTIMATED GFR    Collection Time: 17  7:55 PM   Result Value Ref Range    GFR If African American >60 >60 mL/min/1.73 m 2    GFR If Non African American >60 >60 mL/min/1.73 m 2   EKG (NOW)    Collection Time: 17  8:00 PM   Result Value Ref Range    Report       [facility]    Test Date:  2017  Pt Name:    LAZARO PEÑALOZA                 Department: ER  MRN:        [id number]                      Room:       [facility]  Gender:     F                            Technician: [id number]  :        1990                   Requested By:MARIELA MUSTAFA  Order #:    [id number]                    Olimpia MD: MARIELA MUSTAFA MD    Measurements  Intervals                                Axis  Rate:       82                           P:          73  CO:         152                          QRS:         86  QRSD:       92                           T:          31  QT:         388  QTc:        453    Interpretive Statements  SINUS RHYTHM  Compared to ECG 04/24/2017 10:57:35  Sinus bradycardia no longer present  Ventricular premature complex(es) no longer present  T-wave abnormality no longer present    Electronically Signed On 4- 0:51:54 PDT by MARIELA MUSTAFA MD     URINALYSIS CULTURE, IF INDICATED    Collection Time: 04/29/17  8:14 PM   Result Value Ref Range    Micro Urine Req Microscopic     Color Yellow     Character Sl Cloudy (A)     Specific Gravity 1.017 <1.035    Ph 6.5 5.0-8.0    Glucose Negative Negative mg/dL    Ketones 60 (A) Negative mg/dL    Protein 30 (A) Negative mg/dL    Bilirubin Negative Negative    Nitrite Negative Negative    Leukocyte Esterase Negative Negative    Occult Blood Negative Negative    Culture Indicated No UA Culture   URINE DRUG SCREEN    Collection Time: 04/29/17  8:14 PM   Result Value Ref Range    Amphetamines Urine Negative Negative    Barbiturates Negative Negative    Benzodiazepines Positive (A) Negative    Cocaine Metabolite Negative Negative    Methadone Negative Negative    Ecstasy Negative Negative    Opiates Negative Negative    Oxycodone Negative Negative    Phencyclidine -Pcp Negative Negative    Propoxyphene Negative Negative    Cannabinoid Metab Positive (A) Negative   URINE MICROSCOPIC (W/UA)    Collection Time: 04/29/17  8:14 PM   Result Value Ref Range    WBC 0-2 /hpf    Epithelial Cells Few /hpf    Mucous Threads Moderate /hpf    Amorphous Crystal Present /hpf    Hyaline Cast 3-5 (A) /lpf   ACCU-CHEK GLUCOSE    Collection Time: 04/29/17  8:44 PM   Result Value Ref Range    Glucose - Accu-Ck 111 (H) 65 - 99 mg/dL       Imaging/Procedures Review:    ndependant Imaging Review: Completed  CT-HEAD W/O   Final Result         NO ACUTE ABNORMALITIES ARE NOTED ON CT SCAN OF THE HEAD.              EKG:   EKG Independant Review: Completed  QTc:453, HR: 82,  Normal Sinus Rhythm, no ST/T changes              Assessment/Plan     No new assessment & plan notes have been filed under this hospital service since the last note was generated.  Service: MEDICAL    Seizure  - Admission VS: 9T 98, /74, RR 33, HR  83, O2% 95  - Admission labs: WBC 8.9, Na/K 140/3.5, BUN/Crt 9/.44, ETOH .01, Utox (+) Benzo/THC  - CT head: No acute abnormalities noted  - Pt has had multiple prior admissions for seizure-like activity and EEG 2015 which did not show any particular focal slowing, epileptogenic or seizure activity.   - Etiology unclear, per EDP, pt had distinct seizure-like (unable to follow commands/stiff/fist clenching) activity upon initial arrival, but also had pseudoseizure-like (able to follow commands) activity once the patient was back to baseline.   - ICU admit, seizure/aspiration/fall precautions, Keppra 500mg BID, Fosphenytoin 1000mg x1, Follow Neuro recs  - F/u Prolactin level      Anticipated Hospital stay:  >2 midnights        Quality Measures  Core Measures  DVT prophylaxis: Heparin  GI prophylaxis: None  Abx: None  Tubes: None  Barrett: Yes  Lines: PIV

## 2017-04-30 NOTE — ED NOTES
Pt will start to shake when being asked a lot of questions or having to move for nurse, when I walk out of room to grab help, meds or ERP pt will stop.  I notified ERP. No further orders at this time.

## 2017-04-30 NOTE — PROGRESS NOTES
Pt transported from ED to RICU 110 on monitor by 2 ACLS RN's without incident. Upon arrival pt very lethargic, non verbal, mumbles only. 1 minute later pt is oriented x3, asking for phone and blanket from home.

## 2017-04-30 NOTE — CARE PLAN
Problem: Knowledge Deficit  Goal: Knowledge of disease process/condition, treatment plan, diagnostic tests, and medications will improve  Patient updated on POC and all questions answered.     Problem: Pain Management  Goal: Pain level will decrease to patient’s comfort goal  MD updated of patient's pain level and pain medication given PRN per MAR.

## 2017-04-30 NOTE — ED PROVIDER NOTES
ED Provider Note    Scribed for Yifan Luna M.D. by Kanu Javier. 4/29/2017, 7:56 PM.    Primary care provider: Pcp Pt States None  Means of arrival: EMS  History obtained from: EMS  History limited by: Active seizure    CHIEF COMPLAINT  Chief Complaint   Patient presents with   • Seizure       HPI  Rupa Yang is a 26 y.o. female who presents to the Emergency Department with an active seizure upon arrival. EMS arrived on scene to the patient seizing. They do not know when the seizure started or what the patient was doing prior to the seizure. They note patient's arm started to tense first. Her blood sugar was 131, blood pressure 137/95, and heart rate 100 upon EMS evaluation.    Further history unable to be obtained secondary to active seizure.    REVIEW OF SYSTEMS  Review of Systems   Neurological:        Positive active seizure   All other systems reviewed and are negative.  C  Further ROS unable to be obtained secondary to active seizure.    PAST MEDICAL HISTORY   has a past medical history of Scoliosis; Eczema; Substance abuse; Gastroenteritis; Arrhythmia; Heart burn; Indigestion; Other specified disorder of intestines; Dental disorder; and Pain (02-24-15).    SURGICAL HISTORY   has past surgical history that includes primary c section (11/27/2013) and earl by laparoscopy (3/3/2015).    SOCIAL HISTORY  Social History   Substance Use Topics   • Smoking status: Current Every Day Smoker -- 0.50 packs/day     Types: Cigarettes   • Smokeless tobacco: Never Used   • Alcohol Use: No      History   Drug Use Not on file     Comment: in past meth, cocaine, weed at age 12 last used at age 13       FAMILY HISTORY  Family History   Problem Relation Age of Onset   • Alcohol/Drug Mother      drugs   • Diabetes Mother      NIDDM   • Alcohol/Drug Father      drugs   • Alcohol/Drug Brother    • Hypertension Maternal Aunt    • Diabetes Maternal Aunt      IDDM   • Diabetes Maternal Grandmother      IDDM   •  "Diabetes Maternal Grandfather      IDDM   • Cancer Paternal Grandmother      breast   • Hypertension Paternal Grandmother        CURRENT MEDICATIONS  No current facility-administered medications on file prior to encounter.     Current Outpatient Prescriptions on File Prior to Encounter   Medication Sig Dispense Refill   • nitrofurantoin macro crystals (MACRODANTIN) 100 MG Cap Take 1 Cap by mouth 2 Times a Day for 7 days. 10 Cap 0   • cyclobenzaprine (FLEXERIL) 10 MG Tab Take 1 Tab by mouth 3 times a day as needed for Muscle Spasms. 20 Tab 0   • ondansetron (ZOFRAN ODT) 4 MG TABLET DISPERSIBLE Take 1 Tab by mouth every four hours as needed. 10 Tab 0   • alprazolam (XANAX) 0.25 MG Tab Take 1 Tab by mouth at bedtime as needed for Sleep. 5 Tab 0   • omeprazole (PRILOSEC) 20 MG delayed-release capsule Take 1 Cap by mouth every day. 30 Cap 11   • sucralfate (CARAFATE) 1 GM Tab Take 1 Tab by mouth 4 Times a Day,Before Meals and at Bedtime. 44 Tab 4   • hydrocodone-acetaminophen (NORCO) 5-325 MG TABS per tablet Take 1 Tab by mouth every four hours as needed. 10 Tab 0   • Acetaminophen-Caff-Pyrilamine (MIDOL MAX ST MENSTRUAL FORMULA PO) Take 1 Tab by mouth as needed.        ALLERGIES  Allergies   Allergen Reactions   • Divalproex Sodium Hives and Rash         • Risperidone Hives and Rash         • Sertraline Hcl [Zoloft] Hives and Rash         • Tegretol [Carbamazepine] Hives and Rash             PHYSICAL EXAM  VITAL SIGNS: Pulse 98  Resp 52  Ht 1.626 m (5' 4\")  Wt 52.164 kg (115 lb)  BMI 19.73 kg/m2  SpO2 99%  Constitutional: Actively undergoing tonic-clonic seizure-like activity with eye deviation upwards.   HENT: Normocephalic, Atraumatic, Oropharynx moist. No gag while seizing.  Eyes: Conjunctiva normal, No discharge.   Neck: Supple, No stridor  Cardiovascular: Normal heart rate, Normal rhythm, No murmurs, equal pulses.   Pulmonary: Normal breath sounds, No respiratory distress, No wheezing, No rales, No " rhonchi.  Chest: No chest wall tenderness or deformity.   Abdomen:Soft, No tenderness, No masses, no rebound, no guarding.   : No loss of bladder control.  Musculoskeletal: No major deformities noted, No tenderness.   Skin: Warm, Dry, No erythema, No rash.   Neurologic: Alert & oriented x 3, Normal motor function,  No focal deficits noted.   Psychiatric: Affect normal, Judgment normal, Mood normal.     LABS  Results for orders placed or performed during the hospital encounter of 04/29/17   CBC WITH DIFFERENTIAL   Result Value Ref Range    WBC 8.9 4.8 - 10.8 K/uL    RBC 3.98 (L) 4.20 - 5.40 M/uL    Hemoglobin 13.1 12.0 - 16.0 g/dL    Hematocrit 37.9 37.0 - 47.0 %    MCV 95.2 81.4 - 97.8 fL    MCH 32.9 27.0 - 33.0 pg    MCHC 34.6 33.6 - 35.0 g/dL    RDW 43.8 35.9 - 50.0 fL    Platelet Count 188 164 - 446 K/uL    MPV 10.4 9.0 - 12.9 fL    Neutrophils-Polys 90.90 (H) 44.00 - 72.00 %    Lymphocytes 6.70 (L) 22.00 - 41.00 %    Monocytes 1.90 0.00 - 13.40 %    Eosinophils 0.00 0.00 - 6.90 %    Basophils 0.10 0.00 - 1.80 %    Immature Granulocytes 0.40 0.00 - 0.90 %    Nucleated RBC 0.00 /100 WBC    Neutrophils (Absolute) 8.07 (H) 2.00 - 7.15 K/uL    Lymphs (Absolute) 0.60 (L) 1.00 - 4.80 K/uL    Monos (Absolute) 0.17 0.00 - 0.85 K/uL    Eos (Absolute) 0.00 0.00 - 0.51 K/uL    Baso (Absolute) 0.01 0.00 - 0.12 K/uL    Immature Granulocytes (abs) 0.04 0.00 - 0.11 K/uL    NRBC (Absolute) 0.00 K/uL   COMP METABOLIC PANEL   Result Value Ref Range    Sodium 140 135 - 145 mmol/L    Potassium 3.5 (L) 3.6 - 5.5 mmol/L    Chloride 106 96 - 112 mmol/L    Co2 22 20 - 33 mmol/L    Anion Gap 12.0 (H) 0.0 - 11.9    Glucose 114 (H) 65 - 99 mg/dL    Bun 9 8 - 22 mg/dL    Creatinine 0.44 (L) 0.50 - 1.40 mg/dL    Calcium 8.7 8.5 - 10.5 mg/dL    AST(SGOT) 11 (L) 12 - 45 U/L    ALT(SGPT) 9 2 - 50 U/L    Alkaline Phosphatase 63 30 - 99 U/L    Total Bilirubin 0.4 0.1 - 1.5 mg/dL    Albumin 3.9 3.2 - 4.9 g/dL    Total Protein 6.5 6.0 - 8.2 g/dL     Globulin 2.6 1.9 - 3.5 g/dL    A-G Ratio 1.5 g/dL   APTT   Result Value Ref Range    APTT 25.5 24.7 - 36.0 sec   PROTHROMBIN TIME   Result Value Ref Range    PT 13.4 12.0 - 14.6 sec    INR 0.99 0.87 - 1.13   HCG Qual Serum   Result Value Ref Range    Beta-Hcg Qualitative Serum Negative Negative   URINALYSIS CULTURE, IF INDICATED   Result Value Ref Range    Micro Urine Req Microscopic     Color Yellow     Character Sl Cloudy (A)     Specific Gravity 1.017 <1.035    Ph 6.5 5.0-8.0    Glucose Negative Negative mg/dL    Ketones 60 (A) Negative mg/dL    Protein 30 (A) Negative mg/dL    Bilirubin Negative Negative    Nitrite Negative Negative    Leukocyte Esterase Negative Negative    Occult Blood Negative Negative    Culture Indicated No UA Culture   URINE DRUG SCREEN   Result Value Ref Range    Amphetamines Urine Negative Negative    Barbiturates Negative Negative    Benzodiazepines Positive (A) Negative    Cocaine Metabolite Negative Negative    Methadone Negative Negative    Ecstasy Negative Negative    Opiates Negative Negative    Oxycodone Negative Negative    Phencyclidine -Pcp Negative Negative    Propoxyphene Negative Negative    Cannabinoid Metab Positive (A) Negative   DIAGNOSTIC ALCOHOL   Result Value Ref Range    Diagnostic Alcohol 0.01 (H) 0.00 g/dL   TRIGLYCERIDE   Result Value Ref Range    Triglycerides 67 0 - 149 mg/dL   URINE MICROSCOPIC (W/UA)   Result Value Ref Range    WBC 0-2 /hpf    Epithelial Cells Few /hpf    Mucous Threads Moderate /hpf    Amorphous Crystal Present /hpf    Hyaline Cast 3-5 (A) /lpf   ESTIMATED GFR   Result Value Ref Range    GFR If African American >60 >60 mL/min/1.73 m 2    GFR If Non African American >60 >60 mL/min/1.73 m 2   ACCU-CHEK GLUCOSE   Result Value Ref Range    Glucose - Accu-Ck 111 (H) 65 - 99 mg/dL   EKG (NOW)   Result Value Ref Range    Report       [facility] Emergency Dept.    Test Date:  2017-04-29  Pt Name:    LAZARO PEÑALOZA                  Department: ER  MRN:        [id number]                      Room:       [facility]  Gender:     F                            Technician: [id number]  :        1990                   Requested By:MARIELA MUSTAFA  Order #:    [id number]                    Reading MD:    Measurements  Intervals                                Axis  Rate:       82                           P:          73  NH:         152                          QRS:        86  QRSD:       92                           T:          31  QT:         388  QTc:        453    Interpretive Statements  SINUS RHYTHM  Compared to ECG 2017 10:57:35  Sinus bradycardia no longer present  Ventricular premature complex(es) no longer present  T-wave abnormality no longer present        All labs reviewed by me.    EKG  12 Lead EKG interpreted by me shows a normal sinus rhythm at a rate of 82. Axis normal. No ST elevations. No T wave inversions. NH interval 152.  normal EKG.    RADIOLOGY  CT-HEAD W/O   Final Result         NO ACUTE ABNORMALITIES ARE NOTED ON CT SCAN OF THE HEAD.           The radiologist's interpretation of all radiological studies have been reviewed by me.    COURSE & MEDICAL DECISION MAKING  Pertinent Labs & Imaging studies reviewed. (See chart for details)    7:56 PM - Patient seen and examined at bedside. Patient will be treated with Ativan 2mg, Keppra 1000 mg in D5W 100ML IVPB, Lorazepam 2mg/ml. Ordered CT head, urinalysis culture, urine drug screen diagnostic alcohol, CBC, CMP, APTT, PTT, HCG qual serum, triglyceride, EKG to evaluate her symptoms. The differential diagnoses include but are not limited to: Seizure, pseudoseizure, lichenoid imbalance, infectious process, drug use    8:05 PM Patient reevaluated at bedside. Patient denies drug or alcohol use. She notes vomiting all day. She denies dysuria. Questionable post-ictal vs sedation secondary to Ativan.    9:25 PM Paged SHEN Serrano.    10:00 PM - I discussed the patient's case and the  above findings with [facility] who will admit the patient.    CRITICAL CARE  I provided critical care services, which included medication orders, frequent reevaluations of the patient's condition and response to treatment, ordering and reviewing test results, and discussing the case with various consultants.  The critical care time associated with the care of the patient was 35 minutes. Review chart for interventions. This time is exclusive of any other billable procedures.        Medical Decision Making: She presents with what appears to be a actual seizure with significant seizure-like activity. This finally stopped after I gave the patient 2 doses of 2 mg Ativan. Patient was started on Keppra. She then again seized before returning to her baseline. Therefore at that point time discussing case with neurology who recommended admission to ICU and loading with fosphenytoin. Patient does have some non-seizure-like activity of shaking that seems to occur only after the nurses in the room. This is not the same seizure-like activity and I observed when she arrived here. My concern is patient actually has real seizures as well as pseudoseizures. Patient may need prolonged EEG monitoring to catch real seizures. Patient does not have a significant allergy N abnormalities or infectious process that I can find. Or any precipitating factor.      DISPOSITION:  Patient will be admitted to [facility] in critical condition.     FINAL IMPRESSION  Critical care time of 35, as outlined above.  1. Seizures (CMS-[facility])    2. Status epilepticus (CMS-[facility])          Kanu MORALES (Scribe), am scribing for, and in the presence of, Yifan Luna M.D.    Electronically signed by: Kanu Javier (Laraibe), 4/29/2017    Yifan MORALES M.D. personally performed the services described in this documentation, as scribed by Kanu Javier in my presence, and it is both accurate and complete.    The note accurately  reflects work and decisions made by me.  Yifan Luna  4/30/2017  12:35 AM

## 2017-04-30 NOTE — PROGRESS NOTES
Jovanna from  contacted to check on status of patient's children. [employer] performed welfare check, children are with patient's ex .

## 2017-04-30 NOTE — PROGRESS NOTES
MDR completed. MD given updates of overnight events. Pt continues to have abdominal pain 8/10 per pt (MD to order pain medications PRN). Neurologist at bedside and assessed patient. MD to cancel EEG. Pt to transfer out of ICU today. POC discussed. Will continue to monitor.

## 2017-04-30 NOTE — PROGRESS NOTES
This RN is covering for Clarisse while she is on break. Patient called and said she wanted to leave because her  had to go to work. RN paged Dr. Red and notified him of this, Dr. Gonda on unit and also notified. MDs agreed patient was ok to discharge. Updated patient. Awaiting discharge orders at this time.

## 2017-04-30 NOTE — DISCHARGE INSTRUCTIONS
Discharge Instructions    Discharged to home by car with friend. Discharged via wheelchair, hospital escort: Yes.  Special equipment needed: Not Applicable    Be sure to schedule a follow-up appointment with your primary care doctor or any specialists as instructed.     Discharge Plan:   Smoking Cessation Offered: Patient Refused  Pneumococcal Vaccine Given - only chart on this line when given: Given (See MAR)  Influenza Vaccine Indication: Patient Refuses    I understand that a diet low in cholesterol, fat, and sodium is recommended for good health. Unless I have been given specific instructions below for another diet, I accept this instruction as my diet prescription.       Special Instructions: None    · Is patient discharged on Warfarin / Coumadin?   No     · Is patient Post Blood Transfusion?  No    Depression / Suicide Risk    As you are discharged from this [facility] facility, it is important to learn how to keep safe from harming yourself.    Recognize the warning signs:  · Abrupt changes in personality, positive or negative- including increase in energy   · Giving away possessions  · Change in eating patterns- significant weight changes-  positive or negative  · Change in sleeping patterns- unable to sleep or sleeping all the time   · Unwillingness or inability to communicate  · Depression  · Unusual sadness, discouragement and loneliness  · Talk of wanting to die  · Neglect of personal appearance   · Rebelliousness- reckless behavior  · Withdrawal from people/activities they love  · Confusion- inability to concentrate     If you or a loved one observes any of these behaviors or has concerns about self-harm, here's what you can do:  · Talk about it- your feelings and reasons for harming yourself  · Remove any means that you might use to hurt yourself (examples: pills, rope, extension cords, firearm)  · Get professional help from the community (Mental Health, Substance Abuse, psychological  counseling)  · Do not be alone:Call your Safe Contact- someone whom you trust who will be there for you.  · Call your local CRISIS HOTLINE 341-5685 or 585-199-4401  · Call your local Children's Mobile Crisis Response Team [place] (983) 322-4760 or www.[employer])  · Call the toll free National Suicide Prevention Hotlines   · National Suicide Prevention Lifeline 510-283-HZQV (4234)  · National [employer] Line Network 800-SUICIDE (384-7422)      Make a follow up appointment with GI physician and Primary Care Physician.

## 2017-04-30 NOTE — ED NOTES
Pt taken to CT, told pt that she was going woke up again with verbal stimuli.  Let ERP know how she is responding, sluggish with verbal.

## 2017-04-30 NOTE — PROGRESS NOTES
"[facility] Progress Note    Attending: Dr. Gonda / SHEN Serrano  Resident:  Dr. Red    CODE STATUS: Full    ID:  26 yr old female with PMH of Chronic Abdominal pain, narcotic dependent, Substance abuse and Pseudoseizures. Admitted with seizure like activity     Interval Events:  RUBIN overnight  C/o Abdominal pain, Unchanged from her usual   VS stable    Consultants:  Neurology      Procedures:  None     ROS:  Constitutional: Denies fevers,chills, weight changes  CVS: Denies chest pain or palpitations   Respiratory: Denies shortness of breath , Denies cough  GI: Denies diarrhea, constipation or GI bleeding   GeU: Denies dysuria or frequency  CNS: Denies headache, focal weakness/parasthesias    Physical Exam:  /74 mmHg  Pulse 62  Resp 15  Ht 1.626 m (5' 4\")  Wt 57.4 kg (126 lb 8.7 oz)  BMI 21.71 kg/m2  SpO2 98%  GEN: Afebrile, not cyanotic  CVS: S1, S2, No murmur  RESP: CTAB, No added sounds  ABD:Soft lax, NT, ND, +BS, no palpable Organomegaly   CNS: A&O x3, Grossly normal CN 2-12, no gross focal neurological finding  EXT: No LLE, no redness, no hotness    Respiratory:       Chest Tube Drains:              Invalid input(s): [id number]    HemoDynamics:  Pulse: 62, Heart Rate (Monitored): 62  Blood Pressure: 119/74 mmHg, NIBP: (!) 92/55 mmHg           Neuro:  GCS Total [place] Coma Score: 14       Fluids:  Intake/Output       04/28/17 0700 - 04/29/17 0659 (Not Admitted) 04/29/17 0700 - 04/30/17 0659 04/30/17 0700 - 05/01/17 0659      [id number] 9798-1619 Total 4248-6364 3221-0332 Total 6578-7375 8813-9106 Total       Intake    I.V.  --  -- --  --  2000 2000  --  -- --    IV Volume (NS bolus) -- -- -- -- 2000 2000 -- -- --    Total Intake -- -- -- -- 2000 2000 -- -- --       Output    Urine  --  -- --  --  1000 1000  --  -- --    Indwelling Cathether -- -- -- -- 1000 1000 -- -- --    Total Output -- -- -- -- 1000 1000 -- -- --       Net I/O     -- -- -- -- 1000 1000 -- -- --        Weight: 57.4 kg (126 lb " 8.7 oz)  Recent Labs      17   0515   SODIUM  140  138   POTASSIUM  3.5*  3.4*   CHLORIDE  106  106   CO2  22  20   BUN  9  5*   CREATININE  0.44*  0.37*   MAGNESIUM   --   1.7   CALCIUM  8.7  8.3*       GI/Nutrition:  taking PO  Liver Function  Recent Labs      17   0515   ALTSGPT  9   --    ASTSGOT  11*   --    ALKPHOSPHAT  63   --    TBILIRUBIN  0.4   --    GLUCOSE  114*  93       Heme:  Recent Labs      17   RBC  3.98*   HEMOGLOBIN  13.1   HEMATOCRIT  37.9   PLATELETCT  188   PROTHROMBTM  13.4   APTT  25.5   INR  0.99       Infectious Disease:  Monitored Temp  Av.2 °C (98.9 °F)  Min: 36.9 °C (98.4 °F)  Max: 37.5 °C (99.5 °F)  Micro: none  Recent Labs      17   WBC  8.9   NEUTSPOLYS  90.90*   LYMPHOCYTES  6.70*   MONOCYTES  1.90   EOSINOPHILS  0.00   BASOPHILS  0.10   ASTSGOT  11*   ALTSGPT  9   ALKPHOSPHAT  63   TBILIRUBIN  0.4     Current Facility-Administered Medications   Medication Dose Frequency Provider Last Rate Last Dose   • lorazepam (ATIVAN) injection 2 mg  2 mg Q10 MIN PRN aMnuel Cash M.D.       • magnesium sulfate IVPB premix 2 g  2 g Once Tyree Red M.D.       • potassium chloride in water (KCL) ivpb 10 mEq  10 mEq Q HOUR Tyree Red M.D.       • senna-docusate (PERICOLACE or SENOKOT S) 8.6-50 MG per tablet 2 Tab  2 Tab BID Kiesha Brown M.D.        And   • polyethylene glycol/lytes (MIRALAX) PACKET 1 Packet  1 Packet QDAY PRN Kiesha Brown M.D.        And   • magnesium hydroxide (MILK OF MAGNESIA) suspension 30 mL  30 mL QDAY PRN Kiesha Brown M.D.        And   • bisacodyl (DULCOLAX) suppository 10 mg  10 mg QDAY PRN Kiesha Brown M.D.       • heparin injection 5,000 Units  5,000 Units Q8HRS Kiesha Brown M.D.   5,000 Units at 17 0523   • levetiracetam (KEPPRA) 500 mg in D5W 100 mL IVPB  500 mg Q12HRS Kiesha Brown M.D.         Last reviewed on 2017 11:01 AM by  Heidy Maier R.N.    Quality  Measures:  Barrett Catheter: No Barrett  DVT Prophylaxis: Heparin  Ulcer ProphylaxisNo  Antibiotics: N/A  Central Line: N/A  Assesed for rehab services: No (Patient returned to prior level of function, rehabilitation not indicated at this time)    Imaging:  CT-HEAD W/O   Final Result         NO ACUTE ABNORMALITIES ARE NOTED ON CT SCAN OF THE HEAD.           Problems:  Seizure like activity    Has h/o Pseudoseizures    Last Video EEG 1/2015: no seizure recorded    Received Ativan 4 mg in the ER    Loaded with Fosphenytoin (per tele neurology recommendation)   On Keppra 500 BID    Electrolytes abnormalities    Hypokalemia   HypoMg    Polysubstance abuse   Serum alcohol level 0.01   Utx +ve for Cannabinoids     Chronic abdominal pain    Outpatient work up per pt dx with abdominal migraines / Endometrioses   On PPI, Sucralfate and Norco as outpatient     Insomnia    On Xanax QPM as outpatient        Plan:  Cont current management   Replacing electrolytes   Consult Neurology for further recommendation     DISPO: Transfer to medical floor    Patient wants to leave and go home, so i talked with Neurology and they recommend Keppra 500 mg bid for 1 week than 250 mg bid for 1 week and than to stop taking it.  Recommend follow up with her PCP and GI
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
## 2017-06-07 NOTE — ADDENDUM NOTE
Encounter addended by: Olya Richards R.N. on: 6/7/2017  9:31 AM<BR>     Documentation filed: Inpatient Document Flowsheet

## 2017-06-13 ENCOUNTER — APPOINTMENT (OUTPATIENT)
Dept: RADIOLOGY | Facility: MEDICAL CENTER | Age: 27
End: 2017-06-13
Attending: EMERGENCY MEDICINE
Payer: MEDICAID

## 2017-06-13 ENCOUNTER — HOSPITAL ENCOUNTER (EMERGENCY)
Facility: MEDICAL CENTER | Age: 27
End: 2017-06-13
Attending: EMERGENCY MEDICINE
Payer: MEDICAID

## 2017-06-13 ENCOUNTER — HOSPITAL ENCOUNTER (EMERGENCY)
Facility: MEDICAL CENTER | Age: 27
End: 2017-06-14
Attending: EMERGENCY MEDICINE
Payer: MEDICAID

## 2017-06-13 VITALS
SYSTOLIC BLOOD PRESSURE: 104 MMHG | HEIGHT: 64 IN | DIASTOLIC BLOOD PRESSURE: 50 MMHG | OXYGEN SATURATION: 63 % | WEIGHT: 130 LBS | RESPIRATION RATE: 16 BRPM | HEART RATE: 144 BPM | TEMPERATURE: 98.5 F | BODY MASS INDEX: 22.2 KG/M2

## 2017-06-13 DIAGNOSIS — F44.5 PSEUDOSEIZURE: ICD-10-CM

## 2017-06-13 DIAGNOSIS — R56.9 SEIZURE-LIKE ACTIVITY (HCC): ICD-10-CM

## 2017-06-13 DIAGNOSIS — F41.8 SITUATIONAL ANXIETY: ICD-10-CM

## 2017-06-13 DIAGNOSIS — R10.32 LEFT LOWER QUADRANT PAIN: ICD-10-CM

## 2017-06-13 DIAGNOSIS — R11.2 NON-INTRACTABLE VOMITING WITH NAUSEA, UNSPECIFIED VOMITING TYPE: ICD-10-CM

## 2017-06-13 LAB
ALBUMIN SERPL BCP-MCNC: 4.5 G/DL (ref 3.2–4.9)
ALBUMIN SERPL BCP-MCNC: 4.9 G/DL (ref 3.2–4.9)
ALBUMIN/GLOB SERPL: 1.6 G/DL
ALBUMIN/GLOB SERPL: 1.6 G/DL
ALP SERPL-CCNC: 91 U/L (ref 30–99)
ALP SERPL-CCNC: 93 U/L (ref 30–99)
ALT SERPL-CCNC: 16 U/L (ref 2–50)
ALT SERPL-CCNC: 17 U/L (ref 2–50)
AMORPH CRY #/AREA URNS HPF: PRESENT /HPF
AMPHET UR QL SCN: NEGATIVE
ANION GAP SERPL CALC-SCNC: 11 MMOL/L (ref 0–11.9)
ANION GAP SERPL CALC-SCNC: 11 MMOL/L (ref 0–11.9)
APPEARANCE UR: ABNORMAL
AST SERPL-CCNC: 18 U/L (ref 12–45)
AST SERPL-CCNC: 24 U/L (ref 12–45)
BACTERIA #/AREA URNS HPF: ABNORMAL /HPF
BARBITURATES UR QL SCN: NEGATIVE
BASOPHILS # BLD AUTO: 0.1 % (ref 0–1.8)
BASOPHILS # BLD AUTO: 0.3 % (ref 0–1.8)
BASOPHILS # BLD: 0.01 K/UL (ref 0–0.12)
BASOPHILS # BLD: 0.05 K/UL (ref 0–0.12)
BENZODIAZ UR QL SCN: NEGATIVE
BILIRUB SERPL-MCNC: 0.5 MG/DL (ref 0.1–1.5)
BILIRUB SERPL-MCNC: 0.5 MG/DL (ref 0.1–1.5)
BILIRUB UR QL STRIP.AUTO: ABNORMAL
BUN SERPL-MCNC: 14 MG/DL (ref 8–22)
BUN SERPL-MCNC: 16 MG/DL (ref 8–22)
BZE UR QL SCN: NEGATIVE
CALCIUM SERPL-MCNC: 9.7 MG/DL (ref 8.5–10.5)
CALCIUM SERPL-MCNC: 9.7 MG/DL (ref 8.5–10.5)
CANNABINOIDS UR QL SCN: POSITIVE
CHLORIDE SERPL-SCNC: 104 MMOL/L (ref 96–112)
CHLORIDE SERPL-SCNC: 106 MMOL/L (ref 96–112)
CO2 SERPL-SCNC: 23 MMOL/L (ref 20–33)
CO2 SERPL-SCNC: 25 MMOL/L (ref 20–33)
COLOR UR: ABNORMAL
CREAT SERPL-MCNC: 0.55 MG/DL (ref 0.5–1.4)
CREAT SERPL-MCNC: 0.61 MG/DL (ref 0.5–1.4)
CULTURE IF INDICATED INDCX: NO UA CULTURE
EOSINOPHIL # BLD AUTO: 0 K/UL (ref 0–0.51)
EOSINOPHIL # BLD AUTO: 0 K/UL (ref 0–0.51)
EOSINOPHIL NFR BLD: 0 % (ref 0–6.9)
EOSINOPHIL NFR BLD: 0 % (ref 0–6.9)
EPI CELLS #/AREA URNS HPF: ABNORMAL /HPF
ERYTHROCYTE [DISTWIDTH] IN BLOOD BY AUTOMATED COUNT: 43.9 FL (ref 35.9–50)
ERYTHROCYTE [DISTWIDTH] IN BLOOD BY AUTOMATED COUNT: 44 FL (ref 35.9–50)
ETHANOL BLD-MCNC: 0 G/DL
GFR SERPL CREATININE-BSD FRML MDRD: >60 ML/MIN/1.73 M 2
GFR SERPL CREATININE-BSD FRML MDRD: >60 ML/MIN/1.73 M 2
GLOBULIN SER CALC-MCNC: 2.8 G/DL (ref 1.9–3.5)
GLOBULIN SER CALC-MCNC: 3 G/DL (ref 1.9–3.5)
GLUCOSE SERPL-MCNC: 128 MG/DL (ref 65–99)
GLUCOSE SERPL-MCNC: 176 MG/DL (ref 65–99)
GLUCOSE UR STRIP.AUTO-MCNC: NEGATIVE MG/DL
HCG SERPL QL: NEGATIVE
HCG UR QL: NEGATIVE
HCT VFR BLD AUTO: 39.3 % (ref 37–47)
HCT VFR BLD AUTO: 44.1 % (ref 37–47)
HGB BLD-MCNC: 13.3 G/DL (ref 12–16)
HGB BLD-MCNC: 15 G/DL (ref 12–16)
IMM GRANULOCYTES # BLD AUTO: 0.06 K/UL (ref 0–0.11)
IMM GRANULOCYTES # BLD AUTO: 0.09 K/UL (ref 0–0.11)
IMM GRANULOCYTES NFR BLD AUTO: 0.5 % (ref 0–0.9)
IMM GRANULOCYTES NFR BLD AUTO: 0.6 % (ref 0–0.9)
KETONES UR STRIP.AUTO-MCNC: NEGATIVE MG/DL
LEUKOCYTE ESTERASE UR QL STRIP.AUTO: NEGATIVE
LIPASE SERPL-CCNC: 21 U/L (ref 11–82)
LYMPHOCYTES # BLD AUTO: 0.81 K/UL (ref 1–4.8)
LYMPHOCYTES # BLD AUTO: 1.17 K/UL (ref 1–4.8)
LYMPHOCYTES NFR BLD: 6.8 % (ref 22–41)
LYMPHOCYTES NFR BLD: 7.9 % (ref 22–41)
MCH RBC QN AUTO: 32.1 PG (ref 27–33)
MCH RBC QN AUTO: 32.6 PG (ref 27–33)
MCHC RBC AUTO-ENTMCNC: 33.8 G/DL (ref 33.6–35)
MCHC RBC AUTO-ENTMCNC: 34 G/DL (ref 33.6–35)
MCV RBC AUTO: 94.9 FL (ref 81.4–97.8)
MCV RBC AUTO: 95.9 FL (ref 81.4–97.8)
MDMA UR QL SCN: NEGATIVE
METHADONE UR QL SCN: NEGATIVE
MICRO URNS: ABNORMAL
MONOCYTES # BLD AUTO: 0.51 K/UL (ref 0–0.85)
MONOCYTES # BLD AUTO: 0.72 K/UL (ref 0–0.85)
MONOCYTES NFR BLD AUTO: 4.2 % (ref 0–13.4)
MONOCYTES NFR BLD AUTO: 5 % (ref 0–13.4)
MUCOUS THREADS #/AREA URNS HPF: ABNORMAL /HPF
NEUTROPHILS # BLD AUTO: 15.08 K/UL (ref 2–7.15)
NEUTROPHILS # BLD AUTO: 8.9 K/UL (ref 2–7.15)
NEUTROPHILS NFR BLD: 86.4 % (ref 44–72)
NEUTROPHILS NFR BLD: 88.2 % (ref 44–72)
NITRITE UR QL STRIP.AUTO: NEGATIVE
NRBC # BLD AUTO: 0 K/UL
NRBC # BLD AUTO: 0 K/UL
NRBC BLD AUTO-RTO: 0 /100 WBC
NRBC BLD AUTO-RTO: 0 /100 WBC
OPIATES UR QL SCN: NEGATIVE
OXYCODONE UR QL SCN: NEGATIVE
PCP UR QL SCN: NEGATIVE
PH UR STRIP.AUTO: 6 [PH]
PHENYTOIN SERPL-MCNC: <0.5 UG/ML (ref 10–20)
PLATELET # BLD AUTO: 193 K/UL (ref 164–446)
PLATELET # BLD AUTO: 290 K/UL (ref 164–446)
PMV BLD AUTO: 10.6 FL (ref 9–12.9)
PMV BLD AUTO: 11 FL (ref 9–12.9)
POTASSIUM SERPL-SCNC: 4 MMOL/L (ref 3.6–5.5)
POTASSIUM SERPL-SCNC: 4.1 MMOL/L (ref 3.6–5.5)
PROLACTIN SERPL-MCNC: 5.17 NG/ML (ref 2.8–26)
PROPOXYPH UR QL SCN: NEGATIVE
PROT SERPL-MCNC: 7.3 G/DL (ref 6–8.2)
PROT SERPL-MCNC: 7.9 G/DL (ref 6–8.2)
PROT UR QL STRIP: 100 MG/DL
RBC # BLD AUTO: 4.14 M/UL (ref 4.2–5.4)
RBC # BLD AUTO: 4.6 M/UL (ref 4.2–5.4)
RBC # URNS HPF: ABNORMAL /HPF
RBC UR QL AUTO: ABNORMAL
SODIUM SERPL-SCNC: 140 MMOL/L (ref 135–145)
SODIUM SERPL-SCNC: 140 MMOL/L (ref 135–145)
SP GR UR REFRACTOMETRY: 1.02
SP GR UR STRIP.AUTO: 1.02
TRANS CELLS #/AREA URNS HPF: ABNORMAL /HPF
TROPONIN I SERPL-MCNC: <0.01 NG/ML (ref 0–0.04)
WBC # BLD AUTO: 10.3 K/UL (ref 4.8–10.8)
WBC # BLD AUTO: 17.1 K/UL (ref 4.8–10.8)
WBC #/AREA URNS HPF: ABNORMAL /HPF

## 2017-06-13 PROCEDURE — 80053 COMPREHEN METABOLIC PANEL: CPT

## 2017-06-13 PROCEDURE — 84703 CHORIONIC GONADOTROPIN ASSAY: CPT

## 2017-06-13 PROCEDURE — 700111 HCHG RX REV CODE 636 W/ 250 OVERRIDE (IP)

## 2017-06-13 PROCEDURE — 85025 COMPLETE CBC W/AUTO DIFF WBC: CPT

## 2017-06-13 PROCEDURE — 85025 COMPLETE CBC W/AUTO DIFF WBC: CPT | Mod: 91

## 2017-06-13 PROCEDURE — 700111 HCHG RX REV CODE 636 W/ 250 OVERRIDE (IP): Performed by: EMERGENCY MEDICINE

## 2017-06-13 PROCEDURE — 80053 COMPREHEN METABOLIC PANEL: CPT | Mod: 91

## 2017-06-13 PROCEDURE — 99284 EMERGENCY DEPT VISIT MOD MDM: CPT

## 2017-06-13 PROCEDURE — 96374 THER/PROPH/DIAG INJ IV PUSH: CPT

## 2017-06-13 PROCEDURE — 84146 ASSAY OF PROLACTIN: CPT

## 2017-06-13 PROCEDURE — 96372 THER/PROPH/DIAG INJ SC/IM: CPT | Mod: XU

## 2017-06-13 PROCEDURE — 81025 URINE PREGNANCY TEST: CPT

## 2017-06-13 PROCEDURE — 700105 HCHG RX REV CODE 258: Performed by: EMERGENCY MEDICINE

## 2017-06-13 PROCEDURE — 96375 TX/PRO/DX INJ NEW DRUG ADDON: CPT

## 2017-06-13 PROCEDURE — 80307 DRUG TEST PRSMV CHEM ANLYZR: CPT

## 2017-06-13 PROCEDURE — 81001 URINALYSIS AUTO W/SCOPE: CPT

## 2017-06-13 PROCEDURE — 83690 ASSAY OF LIPASE: CPT

## 2017-06-13 PROCEDURE — 80185 ASSAY OF PHENYTOIN TOTAL: CPT

## 2017-06-13 PROCEDURE — 76830 TRANSVAGINAL US NON-OB: CPT

## 2017-06-13 PROCEDURE — 84484 ASSAY OF TROPONIN QUANT: CPT

## 2017-06-13 RX ORDER — MORPHINE SULFATE 4 MG/ML
2 INJECTION, SOLUTION INTRAMUSCULAR; INTRAVENOUS ONCE
Status: COMPLETED | OUTPATIENT
Start: 2017-06-14 | End: 2017-06-14

## 2017-06-13 RX ORDER — KETOROLAC TROMETHAMINE 30 MG/ML
30 INJECTION, SOLUTION INTRAMUSCULAR; INTRAVENOUS ONCE
Status: COMPLETED | OUTPATIENT
Start: 2017-06-13 | End: 2017-06-13

## 2017-06-13 RX ORDER — ONDANSETRON 4 MG/1
4 TABLET, ORALLY DISINTEGRATING ORAL EVERY 8 HOURS PRN
Qty: 20 TAB | Refills: 0 | Status: SHIPPED | OUTPATIENT
Start: 2017-06-13 | End: 2017-10-02

## 2017-06-13 RX ORDER — LORAZEPAM 2 MG/ML
2 INJECTION INTRAMUSCULAR ONCE
Status: COMPLETED | OUTPATIENT
Start: 2017-06-13 | End: 2017-06-13

## 2017-06-13 RX ORDER — LORAZEPAM 2 MG/ML
INJECTION INTRAMUSCULAR
Status: COMPLETED
Start: 2017-06-13 | End: 2017-06-13

## 2017-06-13 RX ORDER — ONDANSETRON 2 MG/ML
4 INJECTION INTRAMUSCULAR; INTRAVENOUS ONCE
Status: COMPLETED | OUTPATIENT
Start: 2017-06-14 | End: 2017-06-13

## 2017-06-13 RX ORDER — ONDANSETRON 2 MG/ML
INJECTION INTRAMUSCULAR; INTRAVENOUS
Status: COMPLETED
Start: 2017-06-13 | End: 2017-06-13

## 2017-06-13 RX ORDER — SODIUM CHLORIDE 9 MG/ML
1000 INJECTION, SOLUTION INTRAVENOUS ONCE
Status: COMPLETED | OUTPATIENT
Start: 2017-06-13 | End: 2017-06-13

## 2017-06-13 RX ORDER — SODIUM CHLORIDE 9 MG/ML
1000 INJECTION, SOLUTION INTRAVENOUS ONCE
Status: COMPLETED | OUTPATIENT
Start: 2017-06-14 | End: 2017-06-14

## 2017-06-13 RX ADMIN — ONDANSETRON 4 MG: 2 INJECTION INTRAMUSCULAR; INTRAVENOUS at 23:35

## 2017-06-13 RX ADMIN — HYDROMORPHONE HYDROCHLORIDE 1 MG: 1 INJECTION, SOLUTION INTRAMUSCULAR; INTRAVENOUS; SUBCUTANEOUS at 10:15

## 2017-06-13 RX ADMIN — SODIUM CHLORIDE 1000 ML: 9 INJECTION, SOLUTION INTRAVENOUS at 12:25

## 2017-06-13 RX ADMIN — DEXTROSE MONOHYDRATE 1000 MG: 50 INJECTION, SOLUTION INTRAVENOUS at 10:33

## 2017-06-13 RX ADMIN — LORAZEPAM 2 MG: 2 INJECTION INTRAMUSCULAR; INTRAVENOUS at 10:08

## 2017-06-13 RX ADMIN — LORAZEPAM 2 MG: 2 INJECTION INTRAMUSCULAR; INTRAVENOUS at 10:05

## 2017-06-13 RX ADMIN — KETOROLAC TROMETHAMINE 30 MG: 30 INJECTION, SOLUTION INTRAMUSCULAR at 12:24

## 2017-06-13 RX ADMIN — SODIUM CHLORIDE 1000 ML: 9 INJECTION, SOLUTION INTRAVENOUS at 23:38

## 2017-06-13 ASSESSMENT — ENCOUNTER SYMPTOMS
ABDOMINAL PAIN: 1
CHILLS: 0
SHORTNESS OF BREATH: 0
VOMITING: 1
FEVER: 0
SEIZURES: 1
NAUSEA: 1

## 2017-06-13 ASSESSMENT — PAIN SCALES - GENERAL: PAINLEVEL_OUTOF10: 0

## 2017-06-13 ASSESSMENT — PAIN SCALES - WONG BAKER: WONGBAKER_NUMERICALRESPONSE: HURTS EVEN MORE

## 2017-06-13 NOTE — ED AVS SNAPSHOT
Home Care Instructions                                                                                                                Rupa Yang   MRN: [id number]    Department:  [facility], Emergency Dept   Date of Visit:  6/13/2017            [facility], Emergency Dept    [address]    [place] [address]    Phone:  [phone]      You were seen by     Jose Martínez D.O.      Your Diagnosis Was     Non-intractable vomiting with nausea, unspecified vomiting type     R11.2       These are the medications you received during your hospitalization from 06/13/2017 2104 to 06/14/2017 0130     Date/Time Order Dose Route Action    06/13/2017 2335 ondansetron (ZOFRAN) syringe/vial injection 4 mg 4 mg Intravenous Given    06/13/2017 2338 NS infusion 1,000 mL 1,000 mL Intravenous New Bag    06/14/2017 0001 morphine (pf) 4 mg/ml injection 2 mg 2 mg Intravenous Given      Follow-up Information     1. Follow up with [facility].    Contact information    [address]  [place] [address]  [phone]        Medication Information     Review all of your home medications and newly ordered medications with your primary doctor and/or pharmacist as soon as possible. Follow medication instructions as directed by your doctor and/or pharmacist.     Please keep your complete medication list with you and share with your physician. Update the information when medications are discontinued, doses are changed, or new medications (including over-the-counter products) are added; and carry medication information at all times in the event of emergency situations.               Medication List      ASK your doctor about these medications        Instructions    Morning Afternoon Evening Bedtime    cyclobenzaprine 10 MG Tabs   Commonly known as:  FLEXERIL        Take 1 Tab by mouth 3 times a day as needed for Muscle Spasms.   Dose:  10 mg                        hydrocodone-acetaminophen  5-325 MG Tabs per tablet   Commonly known as:  NORCO        Take 1 Tab by mouth every four hours as needed.   Dose:  1 Tab                        MIDOL MAX ST MENSTRUAL FORMULA PO        Take 1 Tab by mouth as needed.   Dose:  1 Tab                        omeprazole 20 MG delayed-release capsule   Commonly known as:  PRILOSEC        Take 1 Cap by mouth every day.   Dose:  20 mg                        * ondansetron 4 MG Tbdp   Commonly known as:  ZOFRAN ODT        Take 1 Tab by mouth every 8 hours as needed for Nausea/Vomiting.   Dose:  4 mg                        * ondansetron 4 MG Tbdp   Commonly known as:  ZOFRAN ODT        Take 1 Tab by mouth every 8 hours as needed for Nausea/Vomiting.   Dose:  4 mg                        sucralfate 1 GM Tabs   Commonly known as:  CARAFATE        Take 1 Tab by mouth 4 Times a Day,Before Meals and at Bedtime.   Dose:  1 g                        * Notice:  This list has 2 medication(s) that are the same as other medications prescribed for you. Read the directions carefully, and ask your doctor or other care provider to review them with you.            Procedures and tests performed during your visit     CBC WITH DIFFERENTIAL    COMP METABOLIC PANEL    Cardiac Monitoring    OG-VWURAEL-0 VIEW    ESTIMATED GFR    HCG QUAL SERUM    IV Saline Lock    LIPASE    Oxygen    Pulse Ox    TROPONIN            Patient Information     Patient Information    Following emergency treatment: all patient requiring follow-up care must return either to a private physician or a clinic if your condition worsens before you are able to obtain further medical attention, please return to the emergency room.     Billing Information    At [facility], we work to make the billing process streamlined for our patients.  Our Representatives are here to answer any questions you may have regarding your hospital bill.  If you have insurance coverage and have supplied your insurance information to us, we will submit  a claim to your insurer on your behalf.  Should you have any questions regarding your bill, we can be reached online or by phone as follows:  Online: You are able pay your bills online or live chat with our representatives about any billing questions you may have. We are here to help Monday - Friday from 8:00am to 7:30pm and 9:00am - 12:00pm on Saturdays.  Please visit [email or web address]  for more information.   Phone:  [phone] or [phone]    Please note that your emergency physician, surgeon, pathologist, radiologist, anesthesiologist, and other specialists are not employed by [facility] and will therefore bill separately for their services.  Please contact them directly for any questions concerning their bills at the numbers below:     Emer[facility] Physician Services:  [phone]  [facility]:  [phone]  [facility]:  [phone]  [facility]:  [phone]    1. Your final bill may vary from the amount quoted upon discharge if all procedures are not complete at that time, or if your doctor has additional procedures of which we are not aware. You will receive an additional bill if you return to the Emergency Department at [facility] for suture removal regardless of the facility of which the sutures were placed.     2. Please arrange for settlement of this account at the emergency registration.    3. All self-pay accounts are due in full at the time of treatment.  If you are unable to meet this obligation then payment is expected within 4-5 days.     4. If you have had radiology studies (CT, X-ray, Ultrasound, MRI), you have received a preliminary result during your emergency department visit. Please contact the radiology department [phone] to receive a copy of your final result. Please discuss the Final result with your primary physician or with the follow up physician provided.     Crisis Hotline:  National  Crisis Hotline:  7-594-MMJDUQL or 1-651.815.9831  [place] Crisis Hotline:    1-248.842.5286 or 654-350-5066         ED Discharge Follow Up Questions    1. In order to provide you with very good care, we would like to follow up with a phone call in the next few days.  May we have your permission to contact you?     YES /  NO    2. What is the best phone number to call you? (       )_____-__________    3. What is the best time to call you?      Morning  /  Afternoon  /  Evening                   Patient Signature:  ____________________________________________________________    Date:  ____________________________________________________________

## 2017-06-13 NOTE — DISCHARGE INSTRUCTIONS
Abdominal Pain (Nonspecific)  Your exam might not show the exact reason you have abdominal pain. Since there are many different causes of abdominal pain, another checkup and more tests may be needed. It is very important to follow up for lasting (persistent) or worsening symptoms. A possible cause of abdominal pain in any person who still has his or her appendix is acute appendicitis. Appendicitis is often hard to diagnose. Normal blood tests, urine tests, ultrasound, and CT scans do not completely rule out early appendicitis or other causes of abdominal pain. Sometimes, only the changes that happen over time will allow appendicitis and other causes of abdominal pain to be determined. Other potential problems that may require surgery may also take time to become more apparent. Because of this, it is important that you follow all of the instructions below.  HOME CARE INSTRUCTIONS   · Rest as much as possible.   · Do not eat solid food until your pain is gone.   · While adults or children have pain: A diet of water, weak decaffeinated tea, broth or bouillon, gelatin, oral rehydration solutions (ORS), frozen ice pops, or ice chips may be helpful.   · When pain is gone in adults or children: Start a light diet (dry toast, crackers, applesauce, or white rice). Increase the diet slowly as long as it does not bother you. Eat no dairy products (including cheese and eggs) and no spicy, fatty, fried, or high-fiber foods.   · Use no alcohol, caffeine, or cigarettes.   · Take your regular medicines unless your caregiver told you not to.   · Take any prescribed medicine as directed.   · Only take over-the-counter or prescription medicines for pain, discomfort, or fever as directed by your caregiver. Do not give aspirin to children.   If your caregiver has given you a follow-up appointment, it is very important to keep that appointment. Not keeping the appointment could result in a permanent injury and/or lasting (chronic) pain  and/or disability. If there is any problem keeping the appointment, you must call to reschedule.   SEEK IMMEDIATE MEDICAL CARE IF:   · Your pain is not gone in 24 hours.   · Your pain becomes worse, changes location, or feels different.   · You or your child has an oral temperature above 102° F (38.9° C), not controlled by medicine.   · Your baby is older than 3 months with a rectal temperature of 102° F (38.9° C) or higher.   · Your baby is 3 months old or younger with a rectal temperature of 100.4° F (38° C) or higher.   · You have shaking chills.   · You keep throwing up (vomiting) or cannot drink liquids.   · There is blood in your vomit or you see blood in your bowel movements.   · Your bowel movements become dark or black.   · You have frequent bowel movements.   · Your bowel movements stop (become blocked) or you cannot pass gas.   · You have bloody, frequent, or painful urination.   · You have yellow discoloration in the skin or whites of the eyes.   · Your stomach becomes bloated or bigger.   · You have dizziness or fainting.   · You have chest or back pain.   MAKE SURE YOU:   · Understand these instructions.   · Will watch your condition.   · Will get help right away if you are not doing well or get worse.   Document Released: 12/18/2006 Document Revised: 03/11/2013 Document Reviewed: 11/15/2010  ExitCare® Patient Information ©2013 [employer].      Nonepileptic Seizures  Nonepileptic seizures are seizures that are not caused by abnormal electrical signals in your brain. These seizures often seem like epileptic seizures, but they are not caused by epilepsy.   There are two types of nonepileptic seizures:  · A physiologic nonepileptic seizure results from a disruption in your brain.  · A psychogenic seizure results from emotional stress. These seizures are sometimes called pseudoseizures.  CAUSES   Causes of physiologic nonepileptic seizures include:   · Sudden drop in blood pressure.  · Low blood  sugar.  · Low levels of salt (sodium) in your blood.  · Low levels of calcium in your blood.  · Migraine.  · Heart rhythm problems.  · Sleep disorders.  · Drug and alcohol abuse.  Common causes of psychogenic nonepileptic seizures include:  · Stress.  · Emotional trauma.  · Sexual or physical abuse.  · Major life events, such as divorce or the death of a loved one.  · Mental health disorders, including panic attack and hyperactivity disorder.  SIGNS AND SYMPTOMS  A nonepileptic seizure can look like an epileptic seizure, including uncontrollable shaking (convulsions), or changes in attention, behavior, or the ability to remain awake and alert. However, there are some differences. Nonepileptic seizures usually:  · Do not cause physical injuries.  · Start slowly.  · Include crying or shrieking.  · Last longer than 2 minutes.  · Have a short recovery time without headache or exhaustion.  DIAGNOSIS   Your health care provider can usually diagnose nonepileptic seizures after taking your medical history and giving you a physical exam. Your health care provider may want to talk to your friends or relatives who have seen you have a seizure.   You may also need to have tests to look for causes of physiologic nonepileptic seizures. This may include an electroencephalogram (EEG), which is a test that measures electrical activity in your brain. If you have had an epileptic seizure, the results of your EEG will be abnormal. If your health care provider thinks you have had a psychogenic nonepileptic seizure, you may need to see a mental health specialist for an evaluation.  TREATMENT   Treatment depends on the type and cause of your seizures.  · For physiologic nonepileptic seizures, treatment is aimed at addressing the underlying condition that caused the seizures. These seizures usually stop when the underlying condition is properly treated.  · Nonepileptic seizures do not respond to the seizure medicines used to treat  epilepsy.  · For psychogenic seizures, you may need to work with a mental health specialist.  HOME CARE INSTRUCTIONS  Home care will depend on the type of nonepileptic seizures you have.   · Follow all your health care provider's instructions.  · Keep all your follow-up appointments.  SEEK MEDICAL CARE IF:  You continue to have seizures after treatment.  SEEK IMMEDIATE MEDICAL CARE IF:  · Your seizures change or become more frequent.  · You injure yourself during a seizure.  · You have one seizure after another.  · You have trouble recovering from a seizure.  · You have chest pain or trouble breathing.  MAKE SURE YOU:  · Understand these instructions.  · Will watch your condition.  · Will get help right away if you are not doing well or get worse.     This information is not intended to replace advice given to you by your health care provider. Make sure you discuss any questions you have with your health care provider.     Document Released: 02/02/2007 Document Revised: 01/08/2016 Document Reviewed: 10/14/2014  [employer] Interactive Patient Education ©2016 [employer]

## 2017-06-13 NOTE — ED PROVIDER NOTES
ED Provider Note    Scribed for Mariano Valladares M.D. by Kanu Javier. 6/13/2017  10:05 AM    Primary care provider: Pcp Pt States None  Means of arrival: Walk in  History obtained from: Nursing  History limited by: Altered mental status    CHIEF COMPLAINT  Chief Complaint   Patient presents with   • Seizure       HPI  Rupa Yang is a 26 y.o. female with a history of drug abuse who presents to the Emergency Department for evaluation of seizures which are ongoing during evaluation. Per nursing, patient was able to walk through the front door when arriving to the ED earlier today. During triage, the patient started seizing on her wheel chair. Nursing states the seizures last from 30 seconds to 1 minute at a time. The patient has had 5 seizure episodes in her room up to initial evaluation.     Further history is unable to be obtained secondary to patient's altered mental status.    REVIEW OF SYSTEMS  Pertinent positives include seizure-like activity.    Further ROS is unable to be obtained secondary to patient's altered mental status.    PAST MEDICAL HISTORY   has a past medical history of Scoliosis; Eczema; Substance abuse; Gastroenteritis; Arrhythmia; Heart burn; Indigestion; Other specified disorder of intestines; Dental disorder; and Pain (02-24-15).    SURGICAL HISTORY   has past surgical history that includes primary c section (11/27/2013) and earl by laparoscopy (3/3/2015).    SOCIAL HISTORY  Social History   Substance Use Topics   • Smoking status: Current Every Day Smoker -- 0.50 packs/day     Types: Cigarettes   • Smokeless tobacco: Never Used   • Alcohol Use: No      History     Comment: in past meth, cocaine, weed at age 12 last used at age 13       FAMILY HISTORY  Family History   Problem Relation Age of Onset   • Alcohol/Drug Mother      drugs   • Diabetes Mother      NIDDM   • Alcohol/Drug Father      drugs   • Alcohol/Drug Brother    • Hypertension Maternal Aunt    • Diabetes Maternal  "Aunt      IDDM   • Diabetes Maternal Grandmother      IDDM   • Diabetes Maternal Grandfather      IDDM   • Cancer Paternal Grandmother      breast   • Hypertension Paternal Grandmother        CURRENT MEDICATIONS  No current facility-administered medications on file prior to encounter.     Current Outpatient Prescriptions on File Prior to Encounter   Medication Sig Dispense Refill   • cyclobenzaprine (FLEXERIL) 10 MG Tab Take 1 Tab by mouth 3 times a day as needed for Muscle Spasms. 20 Tab 0   • omeprazole (PRILOSEC) 20 MG delayed-release capsule Take 1 Cap by mouth every day. 30 Cap 11   • sucralfate (CARAFATE) 1 GM Tab Take 1 Tab by mouth 4 Times a Day,Before Meals and at Bedtime. 44 Tab 4   • hydrocodone-acetaminophen (NORCO) 5-325 MG TABS per tablet Take 1 Tab by mouth every four hours as needed. 10 Tab 0   • Acetaminophen-Caff-Pyrilamine (MIDOL MAX ST MENSTRUAL FORMULA PO) Take 1 Tab by mouth as needed.        ALLERGIES  Allergies   Allergen Reactions   • Divalproex Sodium Hives and Rash         • Risperidone Hives and Rash         • Sertraline Hcl [Zoloft] Hives and Rash         • Tegretol [Carbamazepine] Hives and Rash             PHYSICAL EXAM  VITAL SIGNS: /50 mmHg  Pulse 56  Temp(Src) 36.9 °C (98.5 °F)  Resp 18  Ht 1.626 m (5' 4\")  Wt 58.968 kg (130 lb)  BMI 22.30 kg/m2  SpO2 97%  Constitutional: Well developed, Well nourished. Actively seizing  HENT: Normocephalic, Atraumatic, Bilateral external ears normal, Oropharynx moist, No oral exudates.   Eyes: Pupils 5 mm and reactive, EOMI, Conjunctiva normal, No discharge.   Neck: Supple, No stridor.   Lymphatic: No lymphadenopathy noted.   Cardiovascular: Normal heart rate, Normal rhythm.   Thorax & Lungs: Clear to auscultation bilaterally, No respiratory distress, No wheezing, No crackles.   Abdomen: Soft, Tenderness to left side of abdomen that seems to stop patient's shaking episodes. Tenderness across lower abdomen. No masses, No pulsatile " masses.   Skin: Warm, Dry, No erythema, No rash.   Extremities:, No edema No cyanosis.   Musculoskeletal: No major deformities noted.  Intact distal pulses  Neurologic: Patient with rhythmic seizure-like activity. Seem to stop with vigorous sternal rub. Patient seems to respond to painful stimuli. Patient continues to have intermittent seizure-like activity, and will not follow commands  Psychiatric: Unable to assess    LABS  Labs Reviewed   URINALYSIS,CULTURE IF INDICATED - Abnormal; Notable for the following:     Character Sl Cloudy (*)     Protein 100 (*)     Bilirubin Moderate (*)     Occult Blood Moderate (*)     All other components within normal limits   CBC WITH DIFFERENTIAL - Abnormal; Notable for the following:     WBC 17.1 (*)     Neutrophils-Polys 88.20 (*)     Lymphocytes 6.80 (*)     Neutrophils (Absolute) 15.08 (*)     All other components within normal limits   COMP METABOLIC PANEL - Abnormal; Notable for the following:     Glucose 176 (*)     All other components within normal limits   DILANTIN - Abnormal; Notable for the following:     Phenytoin <0.5 (*)     All other components within normal limits   URINE DRUG SCREEN - Abnormal; Notable for the following:     Cannabinoid Metab Positive (*)     All other components within normal limits   URINE MICROSCOPIC (W/UA) - Abnormal; Notable for the following:     RBC 5-10 (*)     Bacteria Few (*)     All other components within normal limits   HCG QUALITATIVE UR   DIAGNOSTIC ALCOHOL   REFRACTOMETER SG   PROLACTIN   ESTIMATED GFR     All labs reviewed by me.    RADIOLOGY  US-GYN-PELVIS TRANSVAGINAL   Final Result      1.  The endometrial stripe measures 6.8 mm in thickness.   2.  Subendometrial echogenic nodule/possible adenomyosis.   3.  No large ovarian cysts or adnexal masses. No free fluid.        The radiologist's interpretation of all radiological studies have been reviewed by me.    COURSE & MEDICAL DECISION MAKING  Pertinent Labs & Imaging studies  reviewed. (See chart for details)    I reviewed the patient's medical records which showed patient was admitted in April 2017 and had a consult by Dr. Pacheco. Had an EEG done in 2015, did not show seizure-like activity. She was noted to have chronic abdominal pain. When the pain gets severe, she notes alleviation when shaking.    10:05 AM - I was called acutely to the patient's room. Patient seen and examined at bedside. She is actively having intermittent seizure-like episodes. Patient will be treated with Keppra 1000 mg, Ativan 2 mg, Dilaudid 1 mg,  lorazepam 2 mg/ml inj soln. Ordered CT head, CT abdomen-pelvis, HCG qualitative UR, diagnostic alcohol, prolactin, urine drug screen, urinalysis culture, CBC, CMP, dilantin, refractometer SG to evaluate her symptoms. The differential diagnoses include but are not limited to: status epilepticus vs seizures, seizure-like activity    10:33 AM Patient reevaluated at bedside. Patient is now awake and answering questions. She notes her episodes today coincide with her monthly cycles of lower abdominal pain. Patient reports her current episode started yesterday. On revaluation, patient has tenderness across her lower abdomen.    12:20 PM Patient reevaluated at bedside. Patient states she currently has Medicaid. Will call a  to establish a PCP.     12:22 PM Ordered Toradol and fluids.    12:28 PM  The patient will be discharged with Zofran ODT and should return if symptoms worsen or if new symptoms arise. The patient understands and agrees to plan.      Decision Making:  Patient with ongoing seizure-like activity, seems to be fairly convincing for seizures however seems very rhythmic, the patient does have intermittent stopping with painful stimuli, however will not follow commands in between. We gave the patient 2 mg of Ativan IM, followed by 2 mg of Ativan IV, 1 mg of Dilaudid, thousand milligrams of Keppra. This ultimately stopped the patient's seizures and the  patient is awake and alert and able to converse, states that she remembers the episode and typically gets them once a month and the patient has severe abdominal pain. Therefore I canceled the CT scan of the head and abdomen pelvis, got an ultrasound of her pelvic area, this was negative, patient has Norco at home, have gotten the patient follow-up with the primary care physician, we'll have the patient follow-up with Dr. Keyes, have the patient return with worsening symptoms.     The patient will return for new or worsening symptoms and is stable at the time of discharge.    The patient is referred to a primary physician for blood pressure management, diabetic screening, and for all other preventative health concerns.    DISPOSITION:  Patient will be discharged home in stable condition.    FOLLOW UP:  [facility], Emergency Dept  [address] [facility] [id number] [phone]    If symptoms worsen    Iggy Pacheco M.D.  [address] [address]  [phone]          EZIO Jung  [address] [address]  [phone]  Go on 6/16/2017  Please arrive at 8:30am for a 9:00am appointment. Bring ID, Insurance card and medications. Thank you      OUTPATIENT MEDICATIONS:  Discharge Medication List as of 6/13/2017 12:44 PM      START taking these medications    Details   !! ondansetron (ZOFRAN ODT) 4 MG TABLET DISPERSIBLE Take 1 Tab by mouth every 8 hours as needed for Nausea/Vomiting., Disp-20 Tab, R-0, Normal      !! ondansetron (ZOFRAN ODT) 4 MG TABLET DISPERSIBLE Take 1 Tab by mouth every 8 hours as needed for Nausea/Vomiting., Disp-20 Tab, R-0, Print Rx Paper       !! - Potential duplicate medications found. Please discuss with provider.           FINAL IMPRESSION  1. Left lower quadrant pain    2. Non-intractable vomiting with nausea, unspecified vomiting type    3. Seizure-like activity (CMS-HCC)     4. Critical  care of 35 minutes     IKanu (Scribe), am scribing for, and in the presence of, Mariano Valladares M.D..    Electronically signed by: Kanu Javier (Laraibe), 6/13/2017    IMariano M.D. personally performed the services described in this documentation, as scribed by Kanu Javier in my presence, and it is both accurate and complete.    The note accurately reflects work and decisions made by me.  Mariano Valladares  6/13/2017  5:45 PM

## 2017-06-13 NOTE — ED AVS SNAPSHOT
[employer] Access Code: Activation code not generated  Current Bours[employer] Bank Status: Active    Individual Digitalhart  A secure, online tool to manage your health information     [employer]’s Boursorama Bank® is a secure, online tool that connects you to your personalized health information from the privacy of your home -- day or night - making it very easy for you to manage your healthcare. Once the activation process is completed, you can even access your medical information using the Boursorama Bank cathleen, which is available for free in the Apple Cathleen store or Google Play store.     Bours[employer] Bank provides the following levels of access (as shown below):   My Chart Features   [facility] Primary Care Doctor [facility]  Specialists [facility]  Urgent  Care Non-[facility]  Primary Care  Doctor   Email your healthcare team securely and privately 24/7 X X X X   Manage appointments: schedule your next appointment; view details of past/upcoming appointments X      Request prescription refills. X      View recent personal medical records, including lab and immunizations X X X X   View health record, including health history, allergies, medications X X X X   Read reports about your outpatient visits, procedures, consult and ER notes X X X X   See your discharge summary, which is a recap of your hospital and/or ER visit that includes your diagnosis, lab results, and care plan. X X       How to register for Bours[employer]:  1. Go to  [email or web address].  2. Click on the Sign Up Now box, which takes you to the New Member Sign Up page. You will need to provide the following information:  a. Enter your Boursor[employer] Bank Access Code exactly as it appears at the top of this page. (You will not need to use this code after you’ve completed the sign-up process. If you do not sign up before the expiration date, you must request a new code.)   b. Enter your date of birth.   c. Enter your home email address.   d. Click Submit, and follow the next screen’s instructions.  3. Create a Boursorama Bank ID. This will  be your Privatext login ID and cannot be changed, so think of one that is secure and easy to remember.  4. Create a Privatext password. You can change your password at any time.  5. Enter your Password Reset Question and Answer. This can be used at a later time if you forget your password.   6. Enter your e-mail address. This allows you to receive e-mail notifications when new information is available in Privatext.  7. Click Sign Up. You can now view your health information.    For assistance activating your Privatext account, call [phone]

## 2017-06-13 NOTE — ED AVS SNAPSHOT
AppLearn Access Code: Activation code not generated  Current AppLearn Status: Active    [employer]  A secure, online tool to manage your health information     [employer]’s AppLearn® is a secure, online tool that connects you to your personalized health information from the privacy of your home -- day or night - making it very easy for you to manage your healthcare. Once the activation process is completed, you can even access your medical information using the AppLearn cathleen, which is available for free in the Apple Cathleen store or Google Play store.     AppLearn provides the following levels of access (as shown below):   My Chart Features   [facility] Primary Care Doctor [facility]  Specialists [facility]  Urgent  Care Non-[facility]  Primary Care  Doctor   Email your healthcare team securely and privately 24/7 X X X X   Manage appointments: schedule your next appointment; view details of past/upcoming appointments X      Request prescription refills. X      View recent personal medical records, including lab and immunizations X X X X   View health record, including health history, allergies, medications X X X X   Read reports about your outpatient visits, procedures, consult and ER notes X X X X   See your discharge summary, which is a recap of your hospital and/or ER visit that includes your diagnosis, lab results, and care plan. X X       How to register for AppLearn:  1. Go to  [email or web address].  2. Click on the Sign Up Now box, which takes you to the New Member Sign Up page. You will need to provide the following information:  a. Enter your AppLearn Access Code exactly as it appears at the top of this page. (You will not need to use this code after you’ve completed the sign-up process. If you do not sign up before the expiration date, you must request a new code.)   b. Enter your date of birth.   c. Enter your home email address.   d. Click Submit, and follow the next screen’s instructions.  3. Create a AppLearn ID. This will  be your [employer] Displays login ID and cannot be changed, so think of one that is secure and easy to remember.  4. Create a [employer] password. You can change your password at any time.  5. Enter your Password Reset Question and Answer. This can be used at a later time if you forget your password.   6. Enter your e-mail address. This allows you to receive e-mail notifications when new information is available in ZBD Displays.  7. Click Sign Up. You can now view your health information.    For assistance activating your ZBD Displays account, call [phone]

## 2017-06-13 NOTE — ED AVS SNAPSHOT
Home Care Instructions                                                                                                                Rupa Yang   MRN: [id number]    Department:  [facility], Emergency Dept   Date of Visit:  6/13/2017            [facility], Emergency Dept    [address] [place] [id number]    Phone:  [phone]      You were seen by     Mariano Valladares M.D.      Your Diagnosis Was     Left lower quadrant pain     R10.32       These are the medications you received during your hospitalization from 06/13/2017 0956 to 06/13/2017 1244     Date/Time Order Dose Route Action    06/13/2017 1008 LORAZEPAM 2 MG/ML INJ SOLN 2 mg Intravenous Given    06/13/2017 1005 LORAZEPAM 2 MG/ML INJ SOLN 2 mg Intramuscular Given    06/13/2017 1015 HYDROMORPHONE HCL 1 MG/ML INJ SOLN 1 mg Intravenous Given    06/13/2017 1033 levetiracetam (KEPPRA) 1,000 mg in D5W 100 mL IVPB 1,000 mg Intravenous New Bag      Follow-up Information     1. Follow up with [facility], Emergency Dept.    Specialty:  Emergency Medicine    Why:  If symptoms worsen    Contact information    [address] [place] [address] [phone]        2. Follow up with Iggy Pacheco M.D..    Specialty:  Neurology    Contact information     Miley Corrigan  [address] [address] [phone]        Medication Information     Review all of your home medications and newly ordered medications with your primary doctor and/or pharmacist as soon as possible. Follow medication instructions as directed by your doctor and/or pharmacist.     Please keep your complete medication list with you and share with your physician. Update the information when medications are discontinued, doses are changed, or new medications (including over-the-counter products) are added; and carry medication information at all times in the event of emergency situations.               Medication List        START taking these medications        Instructions    Morning Afternoon Evening Bedtime    * ondansetron 4 MG Tbdp   Commonly known as:  ZOFRAN ODT        Take 1 Tab by mouth every 8 hours as needed for Nausea/Vomiting.   Dose:  4 mg                        * ondansetron 4 MG Tbdp   Commonly known as:  ZOFRAN ODT        Take 1 Tab by mouth every 8 hours as needed for Nausea/Vomiting.   Dose:  4 mg                        * Notice:  This list has 2 medication(s) that are the same as other medications prescribed for you. Read the directions carefully, and ask your doctor or other care provider to review them with you.      ASK your doctor about these medications        Instructions    Morning Afternoon Evening Bedtime    cyclobenzaprine 10 MG Tabs   Commonly known as:  FLEXERIL        Take 1 Tab by mouth 3 times a day as needed for Muscle Spasms.   Dose:  10 mg                        hydrocodone-acetaminophen 5-325 MG Tabs per tablet   Commonly known as:  NORCO        Take 1 Tab by mouth every four hours as needed.   Dose:  1 Tab                        MIDOL MAX ST MENSTRUAL FORMULA PO        Take 1 Tab by mouth as needed.   Dose:  1 Tab                        omeprazole 20 MG delayed-release capsule   Commonly known as:  PRILOSEC        Take 1 Cap by mouth every day.   Dose:  20 mg                        sucralfate 1 GM Tabs   Commonly known as:  CARAFATE        Take 1 Tab by mouth 4 Times a Day,Before Meals and at Bedtime.   Dose:  1 g                             Where to Get Your Medications      These medications were sent to [id number]  [place] [facility] - [address] [facility] [address] [place] [address]     Phone:  [phone]    - ondansetron 4 MG Tbdp      You can get these medications from any pharmacy     Bring a paper prescription for each of these medications    - ondansetron 4 MG Tbdp            Procedures and tests performed during your visit     CBC WITH  DIFFERENTIAL    COMP METABOLIC PANEL    Cardiac Monitoring    DIAGNOSTIC ALCOHOL    DILANTIN    ESTIMATED GFR    HCG QUALITATIVE UR    IV Saline Lock    PROLACTIN    Pulse Ox    REFRACTOMETER SG    URINALYSIS,CULTURE IF INDICATED    URINE DRUG SCREEN    URINE MICROSCOPIC (W/UA)    US-GYN-PELVIS TRANSVAGINAL        Discharge Instructions       Abdominal Pain (Nonspecific)  Your exam might not show the exact reason you have abdominal pain. Since there are many different causes of abdominal pain, another checkup and more tests may be needed. It is very important to follow up for lasting (persistent) or worsening symptoms. A possible cause of abdominal pain in any person who still has his or her appendix is acute appendicitis. Appendicitis is often hard to diagnose. Normal blood tests, urine tests, ultrasound, and CT scans do not completely rule out early appendicitis or other causes of abdominal pain. Sometimes, only the changes that happen over time will allow appendicitis and other causes of abdominal pain to be determined. Other potential problems that may require surgery may also take time to become more apparent. Because of this, it is important that you follow all of the instructions below.  HOME CARE INSTRUCTIONS   · Rest as much as possible.   · Do not eat solid food until your pain is gone.   · While adults or children have pain: A diet of water, weak decaffeinated tea, broth or bouillon, gelatin, oral rehydration solutions (ORS), frozen ice pops, or ice chips may be helpful.   · When pain is gone in adults or children: Start a light diet (dry toast, crackers, applesauce, or white rice). Increase the diet slowly as long as it does not bother you. Eat no dairy products (including cheese and eggs) and no spicy, fatty, fried, or high-fiber foods.   · Use no alcohol, caffeine, or cigarettes.   · Take your regular medicines unless your caregiver told you not to.   · Take any prescribed medicine as directed.    · Only take over-the-counter or prescription medicines for pain, discomfort, or fever as directed by your caregiver. Do not give aspirin to children.   If your caregiver has given you a follow-up appointment, it is very important to keep that appointment. Not keeping the appointment could result in a permanent injury and/or lasting (chronic) pain and/or disability. If there is any problem keeping the appointment, you must call to reschedule.   SEEK IMMEDIATE MEDICAL CARE IF:   · Your pain is not gone in 24 hours.   · Your pain becomes worse, changes location, or feels different.   · You or your child has an oral temperature above 102° F (38.9° C), not controlled by medicine.   · Your baby is older than 3 months with a rectal temperature of 102° F (38.9° C) or higher.   · Your baby is 3 months old or younger with a rectal temperature of 100.4° F (38° C) or higher.   · You have shaking chills.   · You keep throwing up (vomiting) or cannot drink liquids.   · There is blood in your vomit or you see blood in your bowel movements.   · Your bowel movements become dark or black.   · You have frequent bowel movements.   · Your bowel movements stop (become blocked) or you cannot pass gas.   · You have bloody, frequent, or painful urination.   · You have yellow discoloration in the skin or whites of the eyes.   · Your stomach becomes bloated or bigger.   · You have dizziness or fainting.   · You have chest or back pain.   MAKE SURE YOU:   · Understand these instructions.   · Will watch your condition.   · Will get help right away if you are not doing well or get worse.   Document Released: 12/18/2006 Document Revised: 03/11/2013 Document Reviewed: 11/15/2010  ExitCare® Patient Information ©2013 [employer].      Nonepileptic Seizures  Nonepileptic seizures are seizures that are not caused by abnormal electrical signals in your brain. These seizures often seem like epileptic seizures, but they are not caused by epilepsy.    There are two types of nonepileptic seizures:  · A physiologic nonepileptic seizure results from a disruption in your brain.  · A psychogenic seizure results from emotional stress. These seizures are sometimes called pseudoseizures.  CAUSES   Causes of physiologic nonepileptic seizures include:   · Sudden drop in blood pressure.  · Low blood sugar.  · Low levels of salt (sodium) in your blood.  · Low levels of calcium in your blood.  · Migraine.  · Heart rhythm problems.  · Sleep disorders.  · Drug and alcohol abuse.  Common causes of psychogenic nonepileptic seizures include:  · Stress.  · Emotional trauma.  · Sexual or physical abuse.  · Major life events, such as divorce or the death of a loved one.  · Mental health disorders, including panic attack and hyperactivity disorder.  SIGNS AND SYMPTOMS  A nonepileptic seizure can look like an epileptic seizure, including uncontrollable shaking (convulsions), or changes in attention, behavior, or the ability to remain awake and alert. However, there are some differences. Nonepileptic seizures usually:  · Do not cause physical injuries.  · Start slowly.  · Include crying or shrieking.  · Last longer than 2 minutes.  · Have a short recovery time without headache or exhaustion.  DIAGNOSIS   Your health care provider can usually diagnose nonepileptic seizures after taking your medical history and giving you a physical exam. Your health care provider may want to talk to your friends or relatives who have seen you have a seizure.   You may also need to have tests to look for causes of physiologic nonepileptic seizures. This may include an electroencephalogram (EEG), which is a test that measures electrical activity in your brain. If you have had an epileptic seizure, the results of your EEG will be abnormal. If your health care provider thinks you have had a psychogenic nonepileptic seizure, you may need to see a mental health specialist for an evaluation.  TREATMENT    Treatment depends on the type and cause of your seizures.  · For physiologic nonepileptic seizures, treatment is aimed at addressing the underlying condition that caused the seizures. These seizures usually stop when the underlying condition is properly treated.  · Nonepileptic seizures do not respond to the seizure medicines used to treat epilepsy.  · For psychogenic seizures, you may need to work with a mental health specialist.  HOME CARE INSTRUCTIONS  Home care will depend on the type of nonepileptic seizures you have.   · Follow all your health care provider's instructions.  · Keep all your follow-up appointments.  SEEK MEDICAL CARE IF:  You continue to have seizures after treatment.  SEEK IMMEDIATE MEDICAL CARE IF:  · Your seizures change or become more frequent.  · You injure yourself during a seizure.  · You have one seizure after another.  · You have trouble recovering from a seizure.  · You have chest pain or trouble breathing.  MAKE SURE YOU:  · Understand these instructions.  · Will watch your condition.  · Will get help right away if you are not doing well or get worse.     This information is not intended to replace advice given to you by your health care provider. Make sure you discuss any questions you have with your health care provider.     Document Released: 02/02/2007 Document Revised: 01/08/2016 Document Reviewed: 10/14/2014  Elsevier Interactive Patient Education ©2016 [employer]            Patient Information     Patient Information    Following emergency treatment: all patient requiring follow-up care must return either to a private physician or a clinic if your condition worsens before you are able to obtain further medical attention, please return to the emergency room.     Billing Information    At [facility], we work to make the billing process streamlined for our patients.  Our Representatives are here to answer any questions you may have regarding your hospital bill.  If you  have insurance coverage and have supplied your insurance information to us, we will submit a claim to your insurer on your behalf.  Should you have any questions regarding your bill, we can be reached online or by phone as follows:  Online: You are able pay your bills online or live chat with our representatives about any billing questions you may have. We are here to help Monday - Friday from 8:00am to 7:30pm and 9:00am - 12:00pm on Saturdays.  Please visit [email or web address]  for more information.   Phone:  [phone] or [phone]    Please note that your emergency physician, surgeon, pathologist, radiologist, anesthesiologist, and other specialists are not employed by [facility] and will therefore bill separately for their services.  Please contact them directly for any questions concerning their bills at the numbers below:     Emergency Physician Services:  [phone]  [facility]:  [phone]  [facility]:  [phone]  [facility]:  [phone]    1. Your final bill may vary from the amount quoted upon discharge if all procedures are not complete at that time, or if your doctor has additional procedures of which we are not aware. You will receive an additional bill if you return to the Emergency Department at [facility] for suture removal regardless of the facility of which the sutures were placed.     2. Please arrange for settlement of this account at the emergency registration.    3. All self-pay accounts are due in full at the time of treatment.  If you are unable to meet this obligation then payment is expected within 4-5 days.     4. If you have had radiology studies (CT, X-ray, Ultrasound, MRI), you have received a preliminary result during your emergency department visit. Please contact the radiology department [phone] to receive a copy of your final result. Please discuss the Final result with your  primary physician or with the follow up physician provided.     Crisis Hotline:  [place] Crisis Hotline:  3-257-RPBHSMX or 1-954.778.9404  [place] Crisis Hotline:    1-578.716.1283 or 651-446-7967         ED Discharge Follow Up Questions    1. In order to provide you with very good care, we would like to follow up with a phone call in the next few days.  May we have your permission to contact you?     YES /  NO    2. What is the best phone number to call you? (       )_____-__________    3. What is the best time to call you?      Morning  /  Afternoon  /  Evening                   Patient Signature:  ____________________________________________________________    Date:  ____________________________________________________________

## 2017-06-13 NOTE — ED AVS SNAPSHOT
6/13/2017    Rupa Yang  [address]  [place] [address]    Dear Rupa:    [facility] wants to ensure your discharge home is safe and you or your loved ones have had all of your questions answered regarding your care after you leave the hospital.    Below is a list of resources and contact information should you have any questions regarding your hospital stay, follow-up instructions, or active medical symptoms.    Questions or Concerns Regarding… Contact   Medical Questions Related to Your Discharge  (7 days a week, 8am-5pm) Contact a Nurse Care Coordinator   [phone]   Medical Questions Not Related to Your Discharge  (24 hours a day / 7 days a week)  Contact the Nurse Health Line   [phone]    Medications or Discharge Instructions Refer to your discharge packet   or contact your [facility] Primary Care Provider   [phone]   Follow-up Appointment(s) Schedule your appointment via [employer]   or contact Scheduling [phone]   Billing Review your statement via [employer]  or contact Billing [phone]   Medical Records Review your records via [employer]   or contact Medical Records [phone]     You may receive a telephone call within two days of discharge. This call is to make certain you understand your discharge instructions and have the opportunity to have any questions answered. You can also easily access your medical information, test results and upcoming appointments via the [employer] online health management tool. You can learn more and sign up at [employer]DelaGet. For assistance setting up your [employer] account, please call [phone].    Once again, we want to ensure your discharge home is safe and that you have a clear understanding of any next steps in your care. If you have any questions or concerns, please do not hesitate to contact us, we are here for you. Thank you for choosing [facility] for your healthcare needs.    Sincerely,    Your [facility] Healthcare Team

## 2017-06-14 VITALS
OXYGEN SATURATION: 94 % | SYSTOLIC BLOOD PRESSURE: 89 MMHG | HEIGHT: 65 IN | DIASTOLIC BLOOD PRESSURE: 55 MMHG | BODY MASS INDEX: 21.66 KG/M2 | HEART RATE: 62 BPM | TEMPERATURE: 98.8 F | RESPIRATION RATE: 31 BRPM | WEIGHT: 130 LBS

## 2017-06-14 PROCEDURE — 700111 HCHG RX REV CODE 636 W/ 250 OVERRIDE (IP): Performed by: EMERGENCY MEDICINE

## 2017-06-14 PROCEDURE — 74000 DX-ABDOMEN-1 VIEW: CPT

## 2017-06-14 RX ADMIN — MORPHINE SULFATE 2 MG: 4 INJECTION INTRAVENOUS at 00:01

## 2017-06-14 ASSESSMENT — PAIN SCALES - GENERAL: PAINLEVEL_OUTOF10: 2

## 2017-06-14 NOTE — ED NOTES
Pt brought in per EMS with seizure like activity at home. EMS states patient was waiting at street side with arrival with shaking behavior that stopped with attempting to give nasal ammonia. Pt arrives arousable and talking. With examining patient began shaking again. Pt was here already today and was evaluated for similar activity. Pt does not deal well with stress per EMS.

## 2017-06-14 NOTE — ED PROVIDER NOTES
"ED Provider Note    Scribed for Jose Martínez D.O. by Nova Scanlon. 6/13/2017  9:20 PM    Primary care provider: None  Means of arrival: Ambulance  History obtained from: Patient and Nurse  History limited by: None    CHIEF COMPLAINT  Chief Complaint   Patient presents with   • Seizure       HPI  Rupa Yang is a 26 y.o. Female who was brought into the ED by ambulance for abdominal with associated nausea and vomiting over the past 3 days. Her pain is located diffusely and states it is intermittent. She states she diagnosed herself with \"seizures\" and has them because it \"makes her feel better.\" The patient was seen in the ED this morning and given Dilaudid and Ativan to alleviate her symptoms. She felt improved and was discharged home so she returned to the ED. The patient denies any fevers, chills, chest pain, or shortness of breath. She reports no other pertinent medical history.    REVIEW OF SYSTEMS  Review of Systems   Constitutional: Negative for fever and chills.   Respiratory: Negative for shortness of breath.    Cardiovascular: Negative for chest pain.   Gastrointestinal: Positive for nausea, vomiting and abdominal pain.   Neurological: Positive for seizures (self diagnosed).   All other systems reviewed and are negative.      PAST MEDICAL HISTORY   has a past medical history of Scoliosis; Eczema; Substance abuse; Gastroenteritis; Arrhythmia; Heart burn; Indigestion; Other specified disorder of intestines; Dental disorder; and Pain (02-24-15).    SURGICAL HISTORY   has past surgical history that includes primary c section (11/27/2013) and earl by laparoscopy (3/3/2015).    SOCIAL HISTORY  Social History   Substance Use Topics   • Smoking status: Current Every Day Smoker -- 0.50 packs/day     Types: Cigarettes   • Smokeless tobacco: Never Used   • Alcohol Use: No      History   Drug Use Not on file     Comment: in past meth, cocaine, weed at age 12 last used at age 13     FAMILY HISTORY  Family " "History   Problem Relation Age of Onset   • Alcohol/Drug Mother      drugs   • Diabetes Mother      NIDDM   • Alcohol/Drug Father      drugs   • Alcohol/Drug Brother    • Hypertension Maternal Aunt    • Diabetes Maternal Aunt      IDDM   • Diabetes Maternal Grandmother      IDDM   • Diabetes Maternal Grandfather      IDDM   • Cancer Paternal Grandmother      breast   • Hypertension Paternal Grandmother      CURRENT MEDICATIONS  No current facility-administered medications on file prior to encounter.     Current Outpatient Prescriptions on File Prior to Encounter   Medication Sig Dispense Refill   • ondansetron (ZOFRAN ODT) 4 MG TABLET DISPERSIBLE Take 1 Tab by mouth every 8 hours as needed for Nausea/Vomiting. 20 Tab 0   • ondansetron (ZOFRAN ODT) 4 MG TABLET DISPERSIBLE Take 1 Tab by mouth every 8 hours as needed for Nausea/Vomiting. 20 Tab 0   • cyclobenzaprine (FLEXERIL) 10 MG Tab Take 1 Tab by mouth 3 times a day as needed for Muscle Spasms. 20 Tab 0   • omeprazole (PRILOSEC) 20 MG delayed-release capsule Take 1 Cap by mouth every day. 30 Cap 11   • sucralfate (CARAFATE) 1 GM Tab Take 1 Tab by mouth 4 Times a Day,Before Meals and at Bedtime. 44 Tab 4   • hydrocodone-acetaminophen (NORCO) 5-325 MG TABS per tablet Take 1 Tab by mouth every four hours as needed. 10 Tab 0   • Acetaminophen-Caff-Pyrilamine (MIDOL MAX ST MENSTRUAL FORMULA PO) Take 1 Tab by mouth as needed.       ALLERGIES  Allergies   Allergen Reactions   • Divalproex Sodium Hives and Rash         • Risperidone Hives and Rash         • Sertraline Hcl [Zoloft] Hives and Rash         • Tegretol [Carbamazepine] Hives and Rash             PHYSICAL EXAM  VITAL SIGNS: BP 89/55 mmHg  Pulse 97  Temp(Src) 37.1 °C (98.8 °F)  Resp 18  Ht 1.65 m (5' 4.96\")  Wt 58.968 kg (130 lb)  BMI 21.66 kg/m2  SpO2 98%    Constitutional: Well developed, well nourished. mild acute distress.  HEENT: Normocephalic, atraumatic. Posterior pharynx clear and moist.  Eyes:  " EOMI. Normal sclera.  Neck: Supple, Full range of motion, nontender.  Chest/Pulmonary: clear to ausculation. Symmetrical expansion.   Cardio: Regular rate and rhythm with no murmur.   Abdomen: Soft, nontender. No peritoneal signs. No guarding. No palpable masses.  Back: No CVA tenderness, nontender midline, no step offs.  Musculoskeletal: No deformity, no edema, neurovascular intact.   Neuro: Clear speech, appropriate, cooperative, cranial nerves II-XII grossly intact. Intermittent rhythmic seizure active without a post ictal phase.  Steady gait.   Psych:  Agitated.  Anxious.     DIAGNOSTIC STUDIES / PROCEDURES    LABS  Results for orders placed or performed during the hospital encounter of 06/13/17   CBC WITH DIFFERENTIAL   Result Value Ref Range    WBC 10.3 4.8 - 10.8 K/uL    RBC 4.14 (L) 4.20 - 5.40 M/uL    Hemoglobin 13.3 12.0 - 16.0 g/dL    Hematocrit 39.3 37.0 - 47.0 %    MCV 94.9 81.4 - 97.8 fL    MCH 32.1 27.0 - 33.0 pg    MCHC 33.8 33.6 - 35.0 g/dL    RDW 44.0 35.9 - 50.0 fL    Platelet Count 193 164 - 446 K/uL    MPV 10.6 9.0 - 12.9 fL    Neutrophils-Polys 86.40 (H) 44.00 - 72.00 %    Lymphocytes 7.90 (L) 22.00 - 41.00 %    Monocytes 5.00 0.00 - 13.40 %    Eosinophils 0.00 0.00 - 6.90 %    Basophils 0.10 0.00 - 1.80 %    Immature Granulocytes 0.60 0.00 - 0.90 %    Nucleated RBC 0.00 /100 WBC    Neutrophils (Absolute) 8.90 (H) 2.00 - 7.15 K/uL    Lymphs (Absolute) 0.81 (L) 1.00 - 4.80 K/uL    Monos (Absolute) 0.51 0.00 - 0.85 K/uL    Eos (Absolute) 0.00 0.00 - 0.51 K/uL    Baso (Absolute) 0.01 0.00 - 0.12 K/uL    Immature Granulocytes (abs) 0.06 0.00 - 0.11 K/uL    NRBC (Absolute) 0.00 K/uL   COMP METABOLIC PANEL   Result Value Ref Range    Sodium 140 135 - 145 mmol/L    Potassium 4.0 3.6 - 5.5 mmol/L    Chloride 106 96 - 112 mmol/L    Co2 23 20 - 33 mmol/L    Anion Gap 11.0 0.0 - 11.9    Glucose 128 (H) 65 - 99 mg/dL    Bun 16 8 - 22 mg/dL    Creatinine 0.55 0.50 - 1.40 mg/dL    Calcium 9.7 8.5 - 10.5  mg/dL    AST(SGOT) 24 12 - 45 U/L    ALT(SGPT) 16 2 - 50 U/L    Alkaline Phosphatase 91 30 - 99 U/L    Total Bilirubin 0.5 0.1 - 1.5 mg/dL    Albumin 4.5 3.2 - 4.9 g/dL    Total Protein 7.3 6.0 - 8.2 g/dL    Globulin 2.8 1.9 - 3.5 g/dL    A-G Ratio 1.6 g/dL   TROPONIN   Result Value Ref Range    Troponin I <0.01 0.00 - 0.04 ng/mL   HCG QUAL SERUM   Result Value Ref Range    Beta-Hcg Qualitative Serum Negative Negative   ESTIMATED GFR   Result Value Ref Range    GFR If African American >60 >60 mL/min/1.73 m 2    GFR If Non African American >60 >60 mL/min/1.73 m 2   LIPASE   Result Value Ref Range    Lipase 21 11 - 82 U/L   All labs reviewed by me.    RADIOLOGY  [id number] VIEW   Final Result         1.  Nonspecific bowel gas pattern.   2.  Hepatomegaly      The radiologist's interpretation of all radiological studies have been reviewed by me.    COURSE & MEDICAL DECISION MAKING  Pertinent Labs & Imaging studies reviewed. (See chart for details)    Reviewed old medical records which show the patient was seen in the ED this morning for a seizure. The patient had blood work and a transvaginal ultrasound performed at that time and the results were reviewed by me.    9:20 PM - Patient seen and examined at bedside for abdominal pain, nausea, and vomiting. The patient had 3 pseudoseizures while we were in the room, but recovers quickly and had no post-ictal phase. Ordered CBC, CMP, troponin, and urine drug screen to evaluate her symptoms. The differential diagnoses include but are not limited to: seizure, pseudoseizure. Sbo, uti, pregnancy.    11:26 PM - On reassessment, the patient is sitting up in bed. She is complaining of abdominal pain and nausea. The patient will be treated with Zofran and an abdomen x-ray.      12:45 AM  The pt was noted to be inducing the vomiting herself, by putting her finger in the back of her throat.  During her seizure episodes, she is responding to a sternal rub, and quickly withdraws,  "stating to 'leave me alone.'  She has multiple episodes of rhythmic activity, with immediate resolution of these, and NO post ictal phase.  At this time, I do not believe these to be real seizures.  She has no oral trauma, and no incontinence either.  She states \"i get these every month.'  She has no fever, and has stopped vomiting here.  Her x rays are negative, and her u/s from earlier today is negative as well.  She will follow up, or return here for any other medical concerns.       FINAL IMPRESSION  1. Non-intractable vomiting with nausea, unspecified vomiting type    2. Pseudoseizure (HCC)          Nova MORALES (Scribe), am scribing for, and in the presence of, Jose Martínez D.O..    Electronically signed by: Nova Scanlon (Kavitha), 6/13/2017    Jose MORALES D.O. personally performed the services described in this documentation, as scribed by Nova Scanlon in my presence, and it is both accurate and complete.    The note accurately reflects work and decisions made by me.  Jose Martínez  6/14/2017  12:47 AM      "

## 2017-06-15 ENCOUNTER — HOSPITAL ENCOUNTER (EMERGENCY)
Facility: MEDICAL CENTER | Age: 27
End: 2017-06-15
Attending: EMERGENCY MEDICINE
Payer: MEDICAID

## 2017-06-15 VITALS
OXYGEN SATURATION: 92 % | RESPIRATION RATE: 16 BRPM | HEART RATE: 93 BPM | SYSTOLIC BLOOD PRESSURE: 119 MMHG | DIASTOLIC BLOOD PRESSURE: 81 MMHG | WEIGHT: 123.02 LBS | HEIGHT: 64 IN | TEMPERATURE: 99.4 F | BODY MASS INDEX: 21 KG/M2

## 2017-06-15 DIAGNOSIS — F44.5 PSEUDOSEIZURES: ICD-10-CM

## 2017-06-15 DIAGNOSIS — R10.84 GENERALIZED ABDOMINAL PAIN: ICD-10-CM

## 2017-06-15 LAB
ALBUMIN SERPL BCP-MCNC: 4.4 G/DL (ref 3.2–4.9)
ALBUMIN/GLOB SERPL: 1.6 G/DL
ALP SERPL-CCNC: 84 U/L (ref 30–99)
ALT SERPL-CCNC: 18 U/L (ref 2–50)
ANION GAP SERPL CALC-SCNC: 13 MMOL/L (ref 0–11.9)
APPEARANCE UR: ABNORMAL
APPEARANCE UR: NORMAL
AST SERPL-CCNC: 22 U/L (ref 12–45)
BASOPHILS # BLD AUTO: 0.3 % (ref 0–1.8)
BASOPHILS # BLD: 0.02 K/UL (ref 0–0.12)
BILIRUB SERPL-MCNC: 0.7 MG/DL (ref 0.1–1.5)
BUN SERPL-MCNC: 12 MG/DL (ref 8–22)
CALCIUM SERPL-MCNC: 9.5 MG/DL (ref 8.5–10.5)
CHLORIDE SERPL-SCNC: 97 MMOL/L (ref 96–112)
CO2 SERPL-SCNC: 26 MMOL/L (ref 20–33)
COLOR UR AUTO: ABNORMAL
COLOR UR AUTO: NORMAL
CREAT SERPL-MCNC: 0.56 MG/DL (ref 0.5–1.4)
EOSINOPHIL # BLD AUTO: 0 K/UL (ref 0–0.51)
EOSINOPHIL NFR BLD: 0 % (ref 0–6.9)
ERYTHROCYTE [DISTWIDTH] IN BLOOD BY AUTOMATED COUNT: 42.8 FL (ref 35.9–50)
GFR SERPL CREATININE-BSD FRML MDRD: >60 ML/MIN/1.73 M 2
GLOBULIN SER CALC-MCNC: 2.8 G/DL (ref 1.9–3.5)
GLUCOSE SERPL-MCNC: 99 MG/DL (ref 65–99)
GLUCOSE UR QL STRIP.AUTO: NEGATIVE MG/DL
GLUCOSE UR STRIP.AUTO-MCNC: NORMAL MG/DL
HCG UR QL: NEGATIVE
HCT VFR BLD AUTO: 39.3 % (ref 37–47)
HGB BLD-MCNC: 13.7 G/DL (ref 12–16)
IMM GRANULOCYTES # BLD AUTO: 0.02 K/UL (ref 0–0.11)
IMM GRANULOCYTES NFR BLD AUTO: 0.3 % (ref 0–0.9)
KETONES UR QL STRIP.AUTO: 15 MG/DL
KETONES UR STRIP.AUTO-MCNC: NORMAL MG/DL
LEUKOCYTE ESTERASE UR QL STRIP.AUTO: NEGATIVE
LEUKOCYTE ESTERASE UR QL STRIP.AUTO: NORMAL
LIPASE SERPL-CCNC: <3 U/L (ref 11–82)
LYMPHOCYTES # BLD AUTO: 1.18 K/UL (ref 1–4.8)
LYMPHOCYTES NFR BLD: 15.6 % (ref 22–41)
MCH RBC QN AUTO: 32.6 PG (ref 27–33)
MCHC RBC AUTO-ENTMCNC: 34.9 G/DL (ref 33.6–35)
MCV RBC AUTO: 93.6 FL (ref 81.4–97.8)
MONOCYTES # BLD AUTO: 0.57 K/UL (ref 0–0.85)
MONOCYTES NFR BLD AUTO: 7.5 % (ref 0–13.4)
NEUTROPHILS # BLD AUTO: 5.77 K/UL (ref 2–7.15)
NEUTROPHILS NFR BLD: 76.3 % (ref 44–72)
NITRITE UR QL STRIP.AUTO: NEGATIVE
NITRITE UR QL STRIP.AUTO: NORMAL
NRBC # BLD AUTO: 0 K/UL
NRBC BLD AUTO-RTO: 0 /100 WBC
PH UR STRIP.AUTO: 7 [PH]
PH UR STRIP.AUTO: NORMAL [PH] (ref 5–8)
PLATELET # BLD AUTO: 218 K/UL (ref 164–446)
PMV BLD AUTO: 10.7 FL (ref 9–12.9)
POTASSIUM SERPL-SCNC: 3.5 MMOL/L (ref 3.6–5.5)
PROT SERPL-MCNC: 7.2 G/DL (ref 6–8.2)
PROT UR QL STRIP: 100 MG/DL
PROT UR QL STRIP: NORMAL MG/DL
RBC # BLD AUTO: 4.2 M/UL (ref 4.2–5.4)
RBC UR QL AUTO: ABNORMAL
RBC UR QL AUTO: NORMAL
SODIUM SERPL-SCNC: 136 MMOL/L (ref 135–145)
SP GR UR STRIP.AUTO: NORMAL
SP GR UR: 1.02
WBC # BLD AUTO: 7.6 K/UL (ref 4.8–10.8)

## 2017-06-15 PROCEDURE — 80053 COMPREHEN METABOLIC PANEL: CPT

## 2017-06-15 PROCEDURE — 99283 EMERGENCY DEPT VISIT LOW MDM: CPT

## 2017-06-15 PROCEDURE — 83690 ASSAY OF LIPASE: CPT

## 2017-06-15 PROCEDURE — 85025 COMPLETE CBC W/AUTO DIFF WBC: CPT

## 2017-06-15 PROCEDURE — 81002 URINALYSIS NONAUTO W/O SCOPE: CPT | Performed by: EMERGENCY MEDICINE

## 2017-06-15 PROCEDURE — 81002 URINALYSIS NONAUTO W/O SCOPE: CPT

## 2017-06-15 PROCEDURE — 36415 COLL VENOUS BLD VENIPUNCTURE: CPT

## 2017-06-15 PROCEDURE — 81025 URINE PREGNANCY TEST: CPT

## 2017-06-15 ASSESSMENT — LIFESTYLE VARIABLES: DO YOU DRINK ALCOHOL: NO

## 2017-06-15 ASSESSMENT — PAIN SCALES - GENERAL: PAINLEVEL_OUTOF10: 10

## 2017-06-15 NOTE — ED NOTES
Pt c/o about waiting for two hours without being seen by doctor. Let pt know dr. watters was in to see the patient earlier while she was having a pseudoseizure. Pt eloped from ER. ERP and charge notified

## 2017-06-15 NOTE — ED PROVIDER NOTES
"ED Provider Note    CHIEF COMPLAINT  Chief Complaint   Patient presents with   • Abdominal Pain     \"all over\" x3 days. Has been seen 2x in the last day, says she was told she has \"abdominal pain\" without a cause. Pt also says she has pseudoseizures to take away the pain. F/u appt with [facility] tomorrow.       HPI  Rupa Yang is a 26 y.o. female who presents with abdominal pain, for the last 2 days, moderate/mild, dull, and tender abdomen, no nausea no vomiting no diarrhea no fever no chills. States the pain is nonradiating, no dysuria urgency or frequency states she is not pregnant.  Surgical complaint of seizure activity. She states she has pseudoseizures was here recently twice for the same problem.  REVIEW OF SYSTEMS  See HPI for further details. History of scoliosis eczema substance abuse gastroenteritis dental disorder chronic back painDenies other G.I., G.U.. endrocine, cardiovascular, respriatory or neurological problems.  All other systems are negative.     PAST MEDICAL HISTORY  Past Medical History   Diagnosis Date   • Scoliosis      dx age 10, chirpractor told severe curvature, left leg shorter.    • Eczema      arms, neck, mild currently   • Substance abuse      meth, coke, heroin, marijuana stopped after parents OD when she age 12.    • Gastroenteritis    • Arrhythmia      \"it feels like palpitations\"   • Heart burn    • Indigestion    • Other specified disorder of intestines      diarrhea   • Dental disorder      \"my teeth are completely dead. Exposed nerves\"   • Pain 02-24-15     chronic back, abd., 2/10       FAMILY HISTORY  Family History   Problem Relation Age of Onset   • Alcohol/Drug Mother      drugs   • Diabetes Mother      NIDDM   • Alcohol/Drug Father      drugs   • Alcohol/Drug Brother    • Hypertension Maternal Aunt    • Diabetes Maternal Aunt      IDDM   • Diabetes Maternal Grandmother      IDDM   • Diabetes Maternal Grandfather      IDDM   • Cancer Paternal Grandmother      breast   • " "Hypertension Paternal Grandmother        SOCIAL HISTORY  Social History     Social History   • Marital Status:      Spouse Name: N/A   • Number of Children: N/A   • Years of Education: N/A     Social History Main Topics   • Smoking status: Current Every Day Smoker -- 0.50 packs/day     Types: Cigarettes   • Smokeless tobacco: Never Used   • Alcohol Use: No   • Drug Use: None      Comment: in past meth, cocaine, weed at age 12 last used at age 13   • Sexual Activity:     Partners: Male      Comment: planned pregnancy FOB involved     Other Topics Concern   • None     Social History Narrative       SURGICAL HISTORY  Past Surgical History   Procedure Laterality Date   • Primary c section  11/27/2013     Performed by Haider Garcia M.D. at [facility]   • Judy by laparoscopy  3/3/2015     Procedure: JUDY BY LAPAROSCOPY;  Surgeon: Aftab Churchill M.D.;  Location: SURGERY [facility];  Service:        CURRENT MEDICATIONS  Home Medications     Reviewed by Betsey Lorenzo R.N. (Registered Nurse) on 06/15/17 at 0621  Med List Status: Partial    Medication Last Dose Status    Acetaminophen-Caff-Pyrilamine (MIDOL MAX ST MENSTRUAL FORMULA PO) Taking-prn Active    cyclobenzaprine (FLEXERIL) 10 MG Tab  Active    hydrocodone-acetaminophen (NORCO) 5-325 MG TABS per tablet  Active    omeprazole (PRILOSEC) 20 MG delayed-release capsule  Active    ondansetron (ZOFRAN ODT) 4 MG TABLET DISPERSIBLE  Active    ondansetron (ZOFRAN ODT) 4 MG TABLET DISPERSIBLE  Active    sucralfate (CARAFATE) 1 GM Tab  Active                ALLERGIES  Allergies   Allergen Reactions   • Divalproex Sodium Hives and Rash         • Risperidone Hives and Rash         • Sertraline Hcl [Zoloft] Hives and Rash         • Tegretol [Carbamazepine] Hives and Rash             PHYSICAL EXAM  VITAL SIGNS: /81 mmHg  Pulse 92  Temp(Src) 37.4 °C (99.4 °F)  Resp 16  Ht 1.626 m (5' 4\")  Wt 55.8 kg (123 lb 0.3 oz)  BMI 21.11 kg/m2  SpO2 " 95%  Constitutional: Well developed, Well nourished, No acute distress, Non-toxic appearance.   HENT: Normocephalic, Atraumatic, Bilateral external ears normal, Oropharynx moist, No oral exudates, Nose normal.   Eyes: PERRL, EOMI, Conjunctiva normal, No discharge.   Neck: Normal range of motion, No tenderness, Supple, No stridor.   Lymphatic: No lymphadenopathy noted.   Cardiovascular: Normal heart rate, Normal rhythm, No murmurs, No rubs, No gallops.   Thorax & Lungs: Normal breath sounds, No respiratory distress, No wheezing, No chest tenderness.   Abdomen:  No tenderness, no guarding no rigidity and the abdomen is soft.  No masses, No pulsatile masses.  Skin: Warm, Dry, No erythema, No rash.   Back: No tenderness, No CVA tenderness.   Extremities: Intact distal pulses, No edema, No tenderness, No cyanosis, No clubbing.   Musculoskeletal: Good range of motion in all major joints. No tenderness to palpation or major deformities noted.   Neurologic: Alert & oriented x 3, Normal motor function, Normal sensory function, No focal deficits noted.   Psychiatric: Pseudoseizures, flexing and bed, stops on demand      RADIOLOGY/PROCEDURES  Impression        1.  The endometrial stripe measures 6.8 mm in thickness.  2.  Subendometrial echogenic nodule/possible adenomyosis.  3.  No large ovarian cysts or adnexal masses. No free fluid.         Reading Provider Reading Date     Cy Hernandez M.D. Jun 13, 2017         COURSE & MEDICAL DECISION MAKING  Pertinent Labs & Imaging studies reviewed. (See chart for details) electrolytes normal, regular function normal liver function normal. Urinalysis normal. She test negative and white count hematocrit platelets are normal    This patient has been evaluated here multiple times in the last few days. Comes today with pseudoseizures.. She stopped seizing when people leave the room. Her lab tests look all normal today however I have emphasized to her that she needs further workup    She  decided to leave the emergency room, a low from the emergency room. Refused to sign a form against medical advice. I did emphasize to her is very important she have close medical follow-up    This patient understands that abdominal pain may be very elusive. At this point there is no evidence of an acute abdominal emergency. However if the pain returns or is no better in 12 hours or any vomiting they should return to the emergency room immediately. Just because the lab and x-rays are normal does not rule out a severe abdominal emergency    This patient has chosen to sign against medical advise. He understands that there may be a severe medical problem that may cause disability or death. The patient understands that they may return to the emergency room any time if they change their mind. I have strongly recommended that this patient stay in the emergency room for completion of the workup and to be admitted, but the patient refuses to stay and refuses to be admitted. I have given them alternative followup with the [facility] where they will not require an appointment. Prior to leaving I have once again strongly recommended that the patient stay for admission. The patient does understand that the consequences of these actions may result in disability or death. I have offered to talk with family in addition.  FINAL IMPRESSION  1.   1. Generalized abdominal pain    2. Pseudoseizures        2.   3.     Disposition  Discharge instructions are understood. This patient is to return if fever vomiting or no better in 12 hours. Follow up with the [facility] clinic or private physician. Information sheets on pseudoseizures abdominal pain  Electronically signed by: William Martínez, 6/15/2017 6:45 AM

## 2017-06-15 NOTE — ED NOTES
Pt having seizure like activity in room.  Patient is unable to hold still for blood draw/PIV.  Pt is unable to answer questions.  ERP at bedside to witness seizure like activity.  Once staff is out of room, patient will rest and stop seizure like activity.

## 2017-06-15 NOTE — DISCHARGE INSTRUCTIONS
Abdominal Pain (Nonspecific)  Your exam might not show the exact reason you have abdominal pain. Since there are many different causes of abdominal pain, another checkup and more tests may be needed. It is very important to follow up for lasting (persistent) or worsening symptoms. A possible cause of abdominal pain in any person who still has his or her appendix is acute appendicitis. Appendicitis is often hard to diagnose. Normal blood tests, urine tests, ultrasound, and CT scans do not completely rule out early appendicitis or other causes of abdominal pain. Sometimes, only the changes that happen over time will allow appendicitis and other causes of abdominal pain to be determined. Other potential problems that may require surgery may also take time to become more apparent. Because of this, it is important that you follow all of the instructions below.  HOME CARE INSTRUCTIONS   · Rest as much as possible.   · Do not eat solid food until your pain is gone.   · While adults or children have pain: A diet of water, weak decaffeinated tea, broth or bouillon, gelatin, oral rehydration solutions (ORS), frozen ice pops, or ice chips may be helpful.   · When pain is gone in adults or children: Start a light diet (dry toast, crackers, applesauce, or white rice). Increase the diet slowly as long as it does not bother you. Eat no dairy products (including cheese and eggs) and no spicy, fatty, fried, or high-fiber foods.   · Use no alcohol, caffeine, or cigarettes.   · Take your regular medicines unless your caregiver told you not to.   · Take any prescribed medicine as directed.   · Only take over-the-counter or prescription medicines for pain, discomfort, or fever as directed by your caregiver. Do not give aspirin to children.   If your caregiver has given you a follow-up appointment, it is very important to keep that appointment. Not keeping the appointment could result in a permanent injury and/or lasting (chronic) pain  and/or disability. If there is any problem keeping the appointment, you must call to reschedule.   SEEK IMMEDIATE MEDICAL CARE IF:   · Your pain is not gone in 24 hours.   · Your pain becomes worse, changes location, or feels different.   · You or your child has an oral temperature above 102° F (38.9° C), not controlled by medicine.   · Your baby is older than 3 months with a rectal temperature of 102° F (38.9° C) or higher.   · Your baby is 3 months old or younger with a rectal temperature of 100.4° F (38° C) or higher.   · You have shaking chills.   · You keep throwing up (vomiting) or cannot drink liquids.   · There is blood in your vomit or you see blood in your bowel movements.   · Your bowel movements become dark or black.   · You have frequent bowel movements.   · Your bowel movements stop (become blocked) or you cannot pass gas.   · You have bloody, frequent, or painful urination.   · You have yellow discoloration in the skin or whites of the eyes.   · Your stomach becomes bloated or bigger.   · You have dizziness or fainting.   · You have chest or back pain.   MAKE SURE YOU:   · Understand these instructions.   · Will watch your condition.   · Will get help right away if you are not doing well or get worse.   Document Released: 12/18/2006 Document Revised: 03/11/2013 Document Reviewed: 11/15/2010  ExitCare® Patient Information ©2013 [employer].  Abdominal Pain, Adult  Many things can cause abdominal pain. Usually, abdominal pain is not caused by a disease and will improve without treatment. It can often be observed and treated at home. Your health care provider will do a physical exam and possibly order blood tests and X-rays to help determine the seriousness of your pain. However, in many cases, more time must pass before a clear cause of the pain can be found. Before that point, your health care provider may not know if you need more testing or further treatment.  HOME CARE INSTRUCTIONS   Monitor your  abdominal pain for any changes. The following actions may help to alleviate any discomfort you are experiencing:  · Only take over-the-counter or prescription medicines as directed by your health care provider.  · Do not take laxatives unless directed to do so by your health care provider.  · Try a clear liquid diet (broth, tea, or water) as directed by your health care provider. Slowly move to a bland diet as tolerated.  SEEK MEDICAL CARE IF:  · You have unexplained abdominal pain.  · You have abdominal pain associated with nausea or diarrhea.  · You have pain when you urinate or have a bowel movement.  · You experience abdominal pain that wakes you in the night.  · You have abdominal pain that is worsened or improved by eating food.  · You have abdominal pain that is worsened with eating fatty foods.  · You have a fever.  SEEK IMMEDIATE MEDICAL CARE IF:   · Your pain does not go away within 2 hours.  · You keep throwing up (vomiting).  · Your pain is felt only in portions of the abdomen, such as the right side or the left lower portion of the abdomen.  · You pass bloody or black tarry stools.  MAKE SURE YOU:  · Understand these instructions.    · Will watch your condition.    · Will get help right away if you are not doing well or get worse.       This information is not intended to replace advice given to you by your health care provider. Make sure you discuss any questions you have with your health care provider.     Document Released: 09/27/2006 Document Revised: 01/08/2016 Document Reviewed: 08/27/2014  [employer] Interactive Patient Education ©2016 [employer]    Conversion Disorder  Conversion disorder is also known as functional neurological symptom disorder. People with this mental disorder have symptoms that suggest a brain or nervous system condition, such as a stroke or seizure. However, no such condition can be found to explain the symptoms. For people with conversion disorder, the symptoms may result  in:  · Severe distress or anxiety.  · Disruption of relationships or other normal life activities.  · Medical evaluation. This often occurs in the primary care or emergency room setting.  Conversion disorder may begin anytime from late childhood through the young adult years. This disorder is more common in females than males.   CAUSES  The exact cause of this disorder is unknown. It is often triggered by stressful life events involving personal relationships.  SIGNS AND SYMPTOMS  Signs and symptoms of conversion disorder may include:  · Muscle weakness or paralysis. If this involves the bladder muscle, it can cause difficulty urinating.  · Seizures or attacks of being unresponsive.  · Abnormal movement, such as tremors, jerks, muscle spasms, loss of balance, or difficulty walking.  · Difficulty swallowing or a feeling of having a lump in the throat.  · Loss of speech (aphonia) or slurring of words (dysarthria).  · Loss of the sensation of touch or pain.  · Changes in vision, such as blindness or double vision.  · Seeing things that are not there (hallucinations).  · Changes in hearing, such as deafness.  Symptoms usually occur suddenly but may increase gradually over time. They usually go away completely in a short time. Seizures and tremors are more likely than other conversion symptoms to come back or continue.  DIAGNOSIS  Diagnosis of this disorder starts with an evaluation by your health care provider. Exams and tests will be done to rule out serious physical health problems. The evaluation will vary depending on your specific symptoms. It may include:  · A physical exam, including a neurological exam.  · Lab tests on blood or urine samples.  · X-rays or other imaging studies.  · An electroencephalogram (EEG) to monitor brain waves for seizure activity.  Your health care provider may refer you to a mental health specialist for psychological evaluation.  TREATMENT  The main goal of treatment is to get the  symptoms to go away as quickly as possible. Most people with this disorder respond well to relaxation techniques and reassurance from their health care provider that the symptoms are stress related. For people with symptoms that do not go away, the following treatment options are available:  · Counseling or talk therapy. Talk therapy is provided by mental health specialists. It involves discussing stressful life events that may have triggered the symptoms and ways to cope with these issues.  · Hypnotherapy. This is the use of hypnosis to help a person overcome conversion symptoms. It is provided by mental health specialists.  · Amobarbital interview. This involves discussing stressful life events that may have triggered the symptoms. The mental health specialist asks you questions after you take a short-acting medicine (amobarbital) that produces a hypnotic state.  · Medicine. Antidepressant medicine may be helpful even if a person with conversion symptoms is not depressed.  · Physical therapy. Physical therapy can help maintain muscle strength in cases of long-term paralysis.  HOME CARE INSTRUCTIONS  · Take medicines only as directed by your health care provider.  · Try to reduce stress.  · Keep all follow-up visits as directed by your health care provider. This is important.  SEEK MEDICAL CARE IF:  · Your symptoms do not go away or they become severe.  · You develop new symptoms.  SEEK IMMEDIATE MEDICAL CARE IF:  You have serious thoughts about hurting yourself or someone else.     This information is not intended to replace advice given to you by your health care provider. Make sure you discuss any questions you have with your health care provider.     Document Released: 01/20/2012 Document Revised: 01/08/2016 Document Reviewed: 04/30/2015  [employer] Interactive Patient Education ©2016 [employer]  Return if fever, vomiting or if no better in 12 hours.Return imediately for admission. Return at any time if you  change your mind about admission. There is risk of death or severe disability.

## 2017-06-15 NOTE — ED NOTES
"Chief Complaint   Patient presents with   • Abdominal Pain     \"all over\" x3 days. Has been seen 2x in the last day, says she was told she has \"abdominal pain\" without a cause. Pt also says she has pseudoseizures to take away the pain. F/u appt with STELLA tomorrow.     Pt ambulatory to triage with above complaints. VSS. Pt back to lobby to wait, instructed to notify staff of any changes.    /81 mmHg  Pulse 92  Temp(Src) 37.4 °C (99.4 °F)  Resp 16  Ht 1.626 m (5' 4\")  Wt 55.8 kg (123 lb 0.3 oz)  BMI 21.11 kg/m2  SpO2 95%    "

## 2017-06-15 NOTE — ED NOTES
Pt doing a self described pseudoseizure in the lobby. Pt placed into rJoy, moved to red 5 with RNs. Charge RN aware.

## 2017-08-06 ENCOUNTER — APPOINTMENT (OUTPATIENT)
Dept: RADIOLOGY | Facility: MEDICAL CENTER | Age: 27
End: 2017-08-06
Attending: EMERGENCY MEDICINE
Payer: MEDICAID

## 2017-08-06 ENCOUNTER — HOSPITAL ENCOUNTER (EMERGENCY)
Facility: MEDICAL CENTER | Age: 27
End: 2017-08-06
Attending: EMERGENCY MEDICINE
Payer: MEDICAID

## 2017-08-06 VITALS
SYSTOLIC BLOOD PRESSURE: 116 MMHG | RESPIRATION RATE: 19 BRPM | WEIGHT: 123 LBS | BODY MASS INDEX: 19.77 KG/M2 | OXYGEN SATURATION: 95 % | HEIGHT: 66 IN | TEMPERATURE: 97.5 F | DIASTOLIC BLOOD PRESSURE: 61 MMHG | HEART RATE: 79 BPM

## 2017-08-06 DIAGNOSIS — F44.5 PSEUDOSEIZURE: ICD-10-CM

## 2017-08-06 DIAGNOSIS — F14.10 COCAINE ABUSE (HCC): ICD-10-CM

## 2017-08-06 DIAGNOSIS — F15.10 METHAMPHETAMINE ABUSE (HCC): ICD-10-CM

## 2017-08-06 DIAGNOSIS — R56.9 SEIZURE (HCC): ICD-10-CM

## 2017-08-06 LAB
ALBUMIN SERPL BCP-MCNC: 5.2 G/DL (ref 3.2–4.9)
ALBUMIN/GLOB SERPL: 1.6 G/DL
ALP SERPL-CCNC: 99 U/L (ref 30–99)
ALT SERPL-CCNC: 15 U/L (ref 2–50)
AMPHET UR QL SCN: NEGATIVE
ANION GAP SERPL CALC-SCNC: 18 MMOL/L (ref 0–11.9)
APAP SERPL-MCNC: <10 UG/ML (ref 10–30)
AST SERPL-CCNC: 17 U/L (ref 12–45)
BARBITURATES UR QL SCN: NEGATIVE
BASOPHILS # BLD AUTO: 0.1 % (ref 0–1.8)
BASOPHILS # BLD: 0.02 K/UL (ref 0–0.12)
BENZODIAZ UR QL SCN: NEGATIVE
BILIRUB SERPL-MCNC: 0.7 MG/DL (ref 0.1–1.5)
BUN SERPL-MCNC: 33 MG/DL (ref 8–22)
BZE UR QL SCN: NEGATIVE
CALCIUM SERPL-MCNC: 10.1 MG/DL (ref 8.5–10.5)
CANNABINOIDS UR QL SCN: POSITIVE
CHLORIDE SERPL-SCNC: 102 MMOL/L (ref 96–112)
CO2 SERPL-SCNC: 19 MMOL/L (ref 20–33)
CREAT SERPL-MCNC: 0.79 MG/DL (ref 0.5–1.4)
EOSINOPHIL # BLD AUTO: 0 K/UL (ref 0–0.51)
EOSINOPHIL NFR BLD: 0 % (ref 0–6.9)
ERYTHROCYTE [DISTWIDTH] IN BLOOD BY AUTOMATED COUNT: 43.8 FL (ref 35.9–50)
ETHANOL BLD-MCNC: 0.01 G/DL
GFR SERPL CREATININE-BSD FRML MDRD: >60 ML/MIN/1.73 M 2
GLOBULIN SER CALC-MCNC: 3.3 G/DL (ref 1.9–3.5)
GLUCOSE SERPL-MCNC: 122 MG/DL (ref 65–99)
HCT VFR BLD AUTO: 43.1 % (ref 37–47)
HGB BLD-MCNC: 15.4 G/DL (ref 12–16)
IMM GRANULOCYTES # BLD AUTO: 0.11 K/UL (ref 0–0.11)
IMM GRANULOCYTES NFR BLD AUTO: 0.6 % (ref 0–0.9)
LYMPHOCYTES # BLD AUTO: 0.9 K/UL (ref 1–4.8)
LYMPHOCYTES NFR BLD: 4.8 % (ref 22–41)
MCH RBC QN AUTO: 32.5 PG (ref 27–33)
MCHC RBC AUTO-ENTMCNC: 35.7 G/DL (ref 33.6–35)
MCV RBC AUTO: 90.9 FL (ref 81.4–97.8)
METHADONE UR QL SCN: NEGATIVE
MONOCYTES # BLD AUTO: 0.78 K/UL (ref 0–0.85)
MONOCYTES NFR BLD AUTO: 4.1 % (ref 0–13.4)
NEUTROPHILS # BLD AUTO: 17.05 K/UL (ref 2–7.15)
NEUTROPHILS NFR BLD: 90.4 % (ref 44–72)
NRBC # BLD AUTO: 0 K/UL
NRBC BLD AUTO-RTO: 0 /100 WBC
OPIATES UR QL SCN: NEGATIVE
OXYCODONE UR QL SCN: NEGATIVE
PCP UR QL SCN: NEGATIVE
PLATELET # BLD AUTO: 243 K/UL (ref 164–446)
PMV BLD AUTO: 11.8 FL (ref 9–12.9)
POTASSIUM SERPL-SCNC: 4 MMOL/L (ref 3.6–5.5)
PROPOXYPH UR QL SCN: NEGATIVE
PROT SERPL-MCNC: 8.5 G/DL (ref 6–8.2)
RBC # BLD AUTO: 4.74 M/UL (ref 4.2–5.4)
SALICYLATES SERPL-MCNC: 0 MG/DL (ref 15–25)
SODIUM SERPL-SCNC: 139 MMOL/L (ref 135–145)
WBC # BLD AUTO: 18.9 K/UL (ref 4.8–10.8)

## 2017-08-06 PROCEDURE — 700111 HCHG RX REV CODE 636 W/ 250 OVERRIDE (IP): Performed by: EMERGENCY MEDICINE

## 2017-08-06 PROCEDURE — 96374 THER/PROPH/DIAG INJ IV PUSH: CPT

## 2017-08-06 PROCEDURE — 80307 DRUG TEST PRSMV CHEM ANLYZR: CPT

## 2017-08-06 PROCEDURE — 70450 CT HEAD/BRAIN W/O DYE: CPT

## 2017-08-06 PROCEDURE — 36415 COLL VENOUS BLD VENIPUNCTURE: CPT

## 2017-08-06 PROCEDURE — 99284 EMERGENCY DEPT VISIT MOD MDM: CPT

## 2017-08-06 PROCEDURE — 80053 COMPREHEN METABOLIC PANEL: CPT

## 2017-08-06 PROCEDURE — 85025 COMPLETE CBC W/AUTO DIFF WBC: CPT

## 2017-08-06 PROCEDURE — 700105 HCHG RX REV CODE 258: Performed by: EMERGENCY MEDICINE

## 2017-08-06 RX ORDER — ONDANSETRON 2 MG/ML
4 INJECTION INTRAMUSCULAR; INTRAVENOUS ONCE
Status: DISCONTINUED | OUTPATIENT
Start: 2017-08-06 | End: 2017-08-06 | Stop reason: HOSPADM

## 2017-08-06 RX ORDER — LORAZEPAM 2 MG/ML
2 INJECTION INTRAMUSCULAR ONCE
Status: COMPLETED | OUTPATIENT
Start: 2017-08-06 | End: 2017-08-06

## 2017-08-06 RX ORDER — SODIUM CHLORIDE 9 MG/ML
1000 INJECTION, SOLUTION INTRAVENOUS ONCE
Status: COMPLETED | OUTPATIENT
Start: 2017-08-06 | End: 2017-08-06

## 2017-08-06 RX ADMIN — LORAZEPAM 2 MG: 2 INJECTION INTRAMUSCULAR; INTRAVENOUS at 10:57

## 2017-08-06 RX ADMIN — SODIUM CHLORIDE 1000 ML: 9 INJECTION, SOLUTION INTRAVENOUS at 11:06

## 2017-08-06 NOTE — ED NOTES
Pt discharged home as ordered by erp. Pt instructed to follow up with her PCP and neurology. Pt verbalized understanding. Pt called a friend for a ride home. Pt left in a wheelchair.

## 2017-08-06 NOTE — ED AVS SNAPSHOT
B[employer]ille Access Code: Activation code not generated  Current Bardolino Grille Status: Active    [employer]  A secure, online tool to manage your health information     [employer]’s Bardolino Grille® is a secure, online tool that connects you to your personalized health information from the privacy of your home -- day or night - making it very easy for you to manage your healthcare. Once the activation process is completed, you can even access your medical information using the Bardolino Grille cathleen, which is available for free in the Apple Cathleen store or Google Play store.     Bardolino Grille provides the following levels of access (as shown below):   My Chart Features   [facility] Primary Care Doctor [facility]  Specialists [facility]  Urgent  Care Non-[facility]  Primary Care  Doctor   Email your healthcare team securely and privately 24/7 X X X X   Manage appointments: schedule your next appointment; view details of past/upcoming appointments X      Request prescription refills. X      View recent personal medical records, including lab and immunizations X X X X   View health record, including health history, allergies, medications X X X X   Read reports about your outpatient visits, procedures, consult and ER notes X X X X   See your discharge summary, which is a recap of your hospital and/or ER visit that includes your diagnosis, lab results, and care plan. X X       How to register for Bardolino Grille:  1. Go to  [email or web address].  2. Click on the Sign Up Now box, which takes you to the New Member Sign Up page. You will need to provide the following information:  a. Enter your Bardolino [employer]ille Access Code exactly as it appears at the top of this page. (You will not need to use this code after you’ve completed the sign-up process. If you do not sign up before the expiration date, you must request a new code.)   b. Enter your date of birth.   c. Enter your home email address.   d. Click Submit, and follow the next screen’s instructions.  3. Create a Bardolino Grille ID. This will  be your [email or web address] login ID and cannot be changed, so think of one that is secure and easy to remember.  4. Create a [email or web address] password. You can change your password at any time.  5. Enter your Password Reset Question and Answer. This can be used at a later time if you forget your password.   6. Enter your e-mail address. This allows you to receive e-mail notifications when new information is available in [email or web address].  7. Click Sign Up. You can now view your health information.    For assistance activating your [email or web address] account, call [phone]

## 2017-08-06 NOTE — ED NOTES
pts siderails protected with pillows over and over again and pt moves them when staff in not in room. Placed back in place

## 2017-08-06 NOTE — ED NOTES
Pt BIB [facility] with c/c of shaking. Pt reporting hx of seizures and can't stop seizing today. Pt continues to shake on gurney then stop then shake then stop. Pt moving all extremities, pt with no postictal phase. Pt holding hands together then releases during shaking episodes. Pt moans throughout entire event

## 2017-08-06 NOTE — ED AVS SNAPSHOT
Home Care Instructions                                                                                                                Rupa Yang   MRN: [id number]    Department:  [facility], Emergency Dept   Date of Visit:  8/6/2017            [facility], Emergency Dept    [address]    [place] [address]    Phone:  [phone]      You were seen by     Gary Gansert, M.D.      Your Diagnosis Was     Seizure (CMS-HCC)     R56.9       These are the medications you received during your hospitalization from 08/06/2017 1041 to 08/06/2017 1250     Date/Time Order Dose Route Action    08/06/2017 1057 lorazepam (ATIVAN) injection 2 mg 2 mg Intravenous Given    08/06/2017 1106 NS infusion 1,000 mL 1,000 mL Intravenous New Bag      Medication Information     Review all of your home medications and newly ordered medications with your primary doctor and/or pharmacist as soon as possible. Follow medication instructions as directed by your doctor and/or pharmacist.     Please keep your complete medication list with you and share with your physician. Update the information when medications are discontinued, doses are changed, or new medications (including over-the-counter products) are added; and carry medication information at all times in the event of emergency situations.               Medication List      ASK your doctor about these medications        Instructions    Morning Afternoon Evening Bedtime    cyclobenzaprine 10 MG Tabs   Commonly known as:  FLEXERIL        Take 1 Tab by mouth 3 times a day as needed for Muscle Spasms.   Dose:  10 mg                        hydrocodone-acetaminophen 5-325 MG Tabs per tablet   Commonly known as:  NORCO        Take 1 Tab by mouth every four hours as needed.   Dose:  1 Tab                        MIDOL MAX ST MENSTRUAL FORMULA PO        Take 1 Tab by mouth as needed.   Dose:  1 Tab                        omeprazole 20 MG delayed-release  capsule   Commonly known as:  PRILOSEC        Take 1 Cap by mouth every day.   Dose:  20 mg                        * ondansetron 4 MG Tbdp   Commonly known as:  ZOFRAN ODT        Take 1 Tab by mouth every 8 hours as needed for Nausea/Vomiting.   Dose:  4 mg                        * ondansetron 4 MG Tbdp   Commonly known as:  ZOFRAN ODT        Take 1 Tab by mouth every 8 hours as needed for Nausea/Vomiting.   Dose:  4 mg                        sucralfate 1 GM Tabs   Commonly known as:  CARAFATE        Take 1 Tab by mouth 4 Times a Day,Before Meals and at Bedtime.   Dose:  1 g                        * Notice:  This list has 2 medication(s) that are the same as other medications prescribed for you. Read the directions carefully, and ask your doctor or other care provider to review them with you.            Procedures and tests performed during your visit     Acetaminophen Level    Blood Alcohol    CBC WITH DIFFERENTIAL    COMP METABOLIC PANEL    CT-HEAD W/O    Cardiac Monitoring    ESTIMATED GFR    IV Saline Lock    NURSING COMMUNICATION    Pulse Ox    SALICYLATE LEVEL    URINE DRUG SCREEN (TRIAGE)            Patient Information     Patient Information    Following emergency treatment: all patient requiring follow-up care must return either to a private physician or a clinic if your condition worsens before you are able to obtain further medical attention, please return to the emergency room.     Billing Information    At [facility], we work to make the billing process streamlined for our patients.  Our Representatives are here to answer any questions you may have regarding your hospital bill.  If you have insurance coverage and have supplied your insurance information to us, we will submit a claim to your insurer on your behalf.  Should you have any questions regarding your bill, we can be reached online or by phone as follows:  Online: You are able pay your bills online or live chat with our representatives about  any billing questions you may have. We are here to help Monday - Friday from 8:00am to 7:30pm and 9:00am - 12:00pm on Saturdays.  Please visit [email or web address]  for more information.   Phone:  [phone] or [phone]    Please note that your emergency physician, surgeon, pathologist, radiologist, anesthesiologist, and other specialists are not employed by [facility] and will therefore bill separately for their services.  Please contact them directly for any questions concerning their bills at the numbers below:     Emergency Physician Services:  [phone]  [facility]:  [phone]  [facility]:  [phone]  [facility]:  [phone]    1. Your final bill may vary from the amount quoted upon discharge if all procedures are not complete at that time, or if your doctor has additional procedures of which we are not aware. You will receive an additional bill if you return to the Emergency Department at [facility] for suture removal regardless of the facility of which the sutures were placed.     2. Please arrange for settlement of this account at the emergency registration.    3. All self-pay accounts are due in full at the time of treatment.  If you are unable to meet this obligation then payment is expected within 4-5 days.     4. If you have had radiology studies (CT, X-ray, Ultrasound, MRI), you have received a preliminary result during your emergency department visit. Please contact the radiology department [phone] to receive a copy of your final result. Please discuss the Final result with your primary physician or with the follow up physician provided.     Crisis Hotline:  [place] Crisis Hotline:  8-162-KQCFUUZ or 1-414.933.3330  [place] Crisis Hotline:    1-170.215.4358 or 683-938-8633         ED Discharge Follow Up Questions    1. In order to provide you with very good care, we would like to follow up with  a phone call in the next few days.  May we have your permission to contact you?     YES /  NO    2. What is the best phone number to call you? (       )_____-__________    3. What is the best time to call you?      Morning  /  Afternoon  /  Evening                   Patient Signature:  ____________________________________________________________    Date:  ____________________________________________________________

## 2017-08-06 NOTE — ED PROVIDER NOTES
"ED Provider Note    CHIEF COMPLAINT  Chief Complaint   Patient presents with   • Shaking       HPI  Rupa Yang is a 26 y.o. female who presents a constant rapid rhythmic shaking of her arms and legs and trunk. Supposedly she has a history of seizure disorder. Whether or not this is a pseudoseizure versus true seizure is unclear at this point initially.. She does have a history of substance abuse. She has decreased level of consciousness and only answer questions so unable to get any more history.    REVIEW OF SYSTEMS  Unavailable because of patient's altered state.  ALL OTHER SYSTEMS NEGATIVE    ALLERGIES  Allergies   Allergen Reactions   • Divalproex Sodium Hives and Rash         • Risperidone Hives and Rash         • Sertraline Hcl [Zoloft] Hives and Rash         • Tegretol [Carbamazepine] Hives and Rash             CURRENT MEDICATIONS  Norco and Zofran.    PAST MEDICAL HISTORY  Past Medical History   Diagnosis Date   • Scoliosis      dx age 10, chirpractor told severe curvature, left leg shorter.    • Eczema      arms, neck, mild currently   • Substance abuse      meth, coke, heroin, marijuana stopped after parents OD when she age 12.    • Gastroenteritis    • Arrhythmia      \"it feels like palpitations\"   • Heart burn    • Indigestion    • Other specified disorder of intestines      diarrhea   • Dental disorder      \"my teeth are completely dead. Exposed nerves\"   • Pain 02-24-15     chronic back, abd., 2/10       SURGICAL HISTORY  Past Surgical History   Procedure Laterality Date   • Primary c section  11/27/2013     Performed by Haider Garcia M.D. at LABOR AND DELIVERY   • Judy by laparoscopy  3/3/2015     Procedure: JUDY BY LAPAROSCOPY;  Surgeon: Aftab Churchill M.D.;  Location: SURGERY [facility];  Service:        FAMILY HISTORY  Family History   Problem Relation Age of Onset   • Alcohol/Drug Mother      drugs   • Diabetes Mother      NIDDM   • Alcohol/Drug Father      drugs   • Alcohol/Drug " "Brother    • Hypertension Maternal Aunt    • Diabetes Maternal Aunt      IDDM   • Diabetes Maternal Grandmother      IDDM   • Diabetes Maternal Grandfather      IDDM   • Cancer Paternal Grandmother      breast   • Hypertension Paternal Grandmother        SOCIAL HISTORY  Social History     Social History   • Marital Status:      Spouse Name: N/A   • Number of Children: N/A   • Years of Education: N/A     Social History Main Topics   • Smoking status: Current Every Day Smoker -- 0.50 packs/day     Types: Cigarettes   • Smokeless tobacco: Never Used   • Alcohol Use: No   • Drug Use: Not on file      Comment: in past meth, cocaine, weed at age 12 last used at age 13   • Sexual Activity:     Partners: Male      Comment: planned pregnancy FOB involved     Other Topics Concern   • Not on file     Social History Narrative       PHYSICAL EXAM  GENERAL: She rhythm medical shaking young female adult. One answer questions. Could be a true seizure could be a pseudoseizure.  VITAL SIGNS: /61 mmHg  Pulse 76  Temp(Src) 36.4 °C (97.5 °F)  Resp 25  Ht 1.676 m (5' 6\")  Wt 55.792 kg (123 lb)  BMI 19.86 kg/m2  Constitutional: Rhythmically shaking female adult   HENT: Scalp is normal size and nontender. Ears are clear. Nose is clear. Throat is clear with no stridor no drooling no trismus. Teeth are all intact.  Eyes: Pupils equal round and reactive to light, extraocular motor fall. There is no scleral icterus.  Neck: Neck is supple and nontender. There is no meningismus. No adenitis. No thyromegaly.  Lymphatic: No adenopathy.   Cardiovascular: Heart regular rhythm without murmurs or gallops   Thorax & Lungs: No chest wall tenderness. Lungs are clear. Patient has good breath sounds bilateral. No rales, no rhonchi, no wheezes.  Abdomen: Abdomen is soft, nontender, not rigid, no guarding, and no organomegaly. There is no palpable hernia   Skin: Warm, pink, and dry with no erythema and no rash.   Back: Nontender, no " midline bony tenderness, no flank tenderness.                                             Extremities: Full range of motion  No tenderness to palpation and no deformities noted. No calf or thigh swelling. No calf or thigh tenderness. No clinical DVT.  Neurologic: Not answering questions unresponsive. Cranial nerves are difficult to assess patient moves all   Psychiatric: Patient is somnolent and won't answer questions. Rhythmically shaking to the arms legs and trunk.      RADIOLOGY/PROCEDURES  CT-HEAD W/O   Final Result      No evidence of acute intracranial process.            COURSE & MEDICAL DECISION MAKING  Patient presents by ambulance with this constant shaking of the arms legs and trunk. She won't answer questions. It's difficult to assess initially whether these are pseudoseizures versus true seizure. Initially she'll be treated as a seizure.    Plan: #1 IV #2 cardiac monitor, pulse ox monitor, blood pressure monitor. #3. Ativan 1-2 mg IV #4. Haldol 2-3 mg IV if needed for persistent shaking. #5. Review previous medical records #6. CT of the head, lab including CBC, CMP, pro time. Urine drug screen. Salicylate and acetaminophen level. Blood alcohol.    Review previous medical records: Seizure disorder, substance abuse, scoliosis. He is a history of pseudoseizures and methamphetamine and cocaine and illicit drug abuse.    Laboratory and reexamination: CO2 is low with a CO2 of 19. Mild metabolic acidosis. BUN is 33. Salicylate level is 0. Acetaminophen less than 10. White count 18,000. No anemia and no bandemia. CT of the head is normal.  Results for orders placed or performed during the hospital encounter of 08/06/17   Blood Alcohol   Result Value Ref Range    Diagnostic Alcohol 0.01 (H) 0.00 g/dL   CBC WITH DIFFERENTIAL   Result Value Ref Range    WBC 18.9 (H) 4.8 - 10.8 K/uL    RBC 4.74 4.20 - 5.40 M/uL    Hemoglobin 15.4 12.0 - 16.0 g/dL    Hematocrit 43.1 37.0 - 47.0 %    MCV 90.9 81.4 - 97.8 fL    MCH  32.5 27.0 - 33.0 pg    MCHC 35.7 (H) 33.6 - 35.0 g/dL    RDW 43.8 35.9 - 50.0 fL    Platelet Count 243 164 - 446 K/uL    MPV 11.8 9.0 - 12.9 fL    Neutrophils-Polys 90.40 (H) 44.00 - 72.00 %    Lymphocytes 4.80 (L) 22.00 - 41.00 %    Monocytes 4.10 0.00 - 13.40 %    Eosinophils 0.00 0.00 - 6.90 %    Basophils 0.10 0.00 - 1.80 %    Immature Granulocytes 0.60 0.00 - 0.90 %    Nucleated RBC 0.00 /100 WBC    Neutrophils (Absolute) 17.05 (H) 2.00 - 7.15 K/uL    Lymphs (Absolute) 0.90 (L) 1.00 - 4.80 K/uL    Monos (Absolute) 0.78 0.00 - 0.85 K/uL    Eos (Absolute) 0.00 0.00 - 0.51 K/uL    Baso (Absolute) 0.02 0.00 - 0.12 K/uL    Immature Granulocytes (abs) 0.11 0.00 - 0.11 K/uL    NRBC (Absolute) 0.00 K/uL   COMP METABOLIC PANEL   Result Value Ref Range    Sodium 139 135 - 145 mmol/L    Potassium 4.0 3.6 - 5.5 mmol/L    Chloride 102 96 - 112 mmol/L    Co2 19 (L) 20 - 33 mmol/L    Anion Gap 18.0 (H) 0.0 - 11.9    Glucose 122 (H) 65 - 99 mg/dL    Bun 33 (H) 8 - 22 mg/dL    Creatinine 0.79 0.50 - 1.40 mg/dL    Calcium 10.1 8.5 - 10.5 mg/dL    AST(SGOT) 17 12 - 45 U/L    ALT(SGPT) 15 2 - 50 U/L    Alkaline Phosphatase 99 30 - 99 U/L    Total Bilirubin 0.7 0.1 - 1.5 mg/dL    Albumin 5.2 (H) 3.2 - 4.9 g/dL    Total Protein 8.5 (H) 6.0 - 8.2 g/dL    Globulin 3.3 1.9 - 3.5 g/dL    A-G Ratio 1.6 g/dL   Acetaminophen Level   Result Value Ref Range    Acetaminophen -Tylenol <10 10 - 30 ug/mL   SALICYLATE LEVEL   Result Value Ref Range    Salicylates, Quant. 0 (L) 15 - 25 mg/dL   ESTIMATED GFR   Result Value Ref Range    GFR If African American >60 >60 mL/min/1.73 m 2    GFR If Non African American >60 >60 mL/min/1.73 m 2        The patient is awake and oriented and coherent and rational. She obviously after close observation for several hours is that the patient is taking these shaking spells. She stable for discharge. She has a history of methamphetamine and cocaine use in the distant past but most recent drug screens only been  positive for marijuana. Age and vital signs are normal. Stable for discharge.  FINAL IMPRESSION  1. Grand mal seizure versus pseudoseizure.  2. Dysuria pseudoseizures.  3. Methamphetamine, cocaine, drug abuse.         Electronically signed by: Gary Gansert, 8/6/2017 /12:50 PM

## 2017-08-06 NOTE — ED AVS SNAPSHOT
8/6/2017    Rupa Yang  [address]  [place] [address]    Dear Rupa:    [facility] wants to ensure your discharge home is safe and you or your loved ones have had all of your questions answered regarding your care after you leave the hospital.    Below is a list of resources and contact information should you have any questions regarding your hospital stay, follow-up instructions, or active medical symptoms.    Questions or Concerns Regarding… Contact   Medical Questions Related to Your Discharge  (7 days a week, 8am-5pm) Contact a Nurse Care Coordinator   [phone]   Medical Questions Not Related to Your Discharge  (24 hours a day / 7 days a week)  Contact the Nurse Health Line   [phone]    Medications or Discharge Instructions Refer to your discharge packet   or contact your [facility] Primary Care Provider   [phone]   Follow-up Appointment(s) Schedule your appointment via [employer]   or contact Scheduling [phone]   Billing Review your statement via [employer]  or contact Billing [phone]   Medical Records Review your records via [employer]   or contact Medical Records [phone]     You may receive a telephone call within two days of discharge. This call is to make certain you understand your discharge instructions and have the opportunity to have any questions answered. You can also easily access your medical information, test results and upcoming appointments via the [employer] online health management tool. You can learn more and sign up at [employer]Gojee. For assistance setting up your [employer] account, please call [phone].    Once again, we want to ensure your discharge home is safe and that you have a clear understanding of any next steps in your care. If you have any questions or concerns, please do not hesitate to contact us, we are here for you. Thank you for choosing [facility] for your healthcare needs.    Sincerely,    Your [facility] Healthcare Team

## 2017-08-08 ENCOUNTER — HOSPITAL ENCOUNTER (EMERGENCY)
Facility: MEDICAL CENTER | Age: 27
End: 2017-08-08
Attending: EMERGENCY MEDICINE
Payer: MEDICAID

## 2017-08-08 VITALS
SYSTOLIC BLOOD PRESSURE: 115 MMHG | WEIGHT: 120 LBS | HEART RATE: 91 BPM | TEMPERATURE: 99.7 F | HEIGHT: 64 IN | BODY MASS INDEX: 20.49 KG/M2 | OXYGEN SATURATION: 90 % | DIASTOLIC BLOOD PRESSURE: 69 MMHG | RESPIRATION RATE: 13 BRPM

## 2017-08-08 DIAGNOSIS — R25.1 EPISODE OF SHAKING: ICD-10-CM

## 2017-08-08 DIAGNOSIS — R94.31 QT PROLONGATION: ICD-10-CM

## 2017-08-08 DIAGNOSIS — R11.15 NON-INTRACTABLE CYCLICAL VOMITING WITH NAUSEA: ICD-10-CM

## 2017-08-08 DIAGNOSIS — R10.84 GENERALIZED ABDOMINAL PAIN: ICD-10-CM

## 2017-08-08 LAB
ALBUMIN SERPL BCP-MCNC: 4.9 G/DL (ref 3.2–4.9)
ALBUMIN/GLOB SERPL: 1.5 G/DL
ALP SERPL-CCNC: 94 U/L (ref 30–99)
ALT SERPL-CCNC: 15 U/L (ref 2–50)
ANION GAP SERPL CALC-SCNC: 11 MMOL/L (ref 0–11.9)
AST SERPL-CCNC: 18 U/L (ref 12–45)
BASOPHILS # BLD AUTO: 0.2 % (ref 0–1.8)
BASOPHILS # BLD: 0.02 K/UL (ref 0–0.12)
BILIRUB SERPL-MCNC: 1.5 MG/DL (ref 0.1–1.5)
BUN SERPL-MCNC: 18 MG/DL (ref 8–22)
CALCIUM SERPL-MCNC: 9.9 MG/DL (ref 8.5–10.5)
CHLORIDE SERPL-SCNC: 94 MMOL/L (ref 96–112)
CO2 SERPL-SCNC: 27 MMOL/L (ref 20–33)
CREAT SERPL-MCNC: 0.58 MG/DL (ref 0.5–1.4)
EKG IMPRESSION: NORMAL
EOSINOPHIL # BLD AUTO: 0.01 K/UL (ref 0–0.51)
EOSINOPHIL NFR BLD: 0.1 % (ref 0–6.9)
ERYTHROCYTE [DISTWIDTH] IN BLOOD BY AUTOMATED COUNT: 41.4 FL (ref 35.9–50)
GFR SERPL CREATININE-BSD FRML MDRD: >60 ML/MIN/1.73 M 2
GLOBULIN SER CALC-MCNC: 3.2 G/DL (ref 1.9–3.5)
GLUCOSE SERPL-MCNC: 99 MG/DL (ref 65–99)
HCT VFR BLD AUTO: 43.1 % (ref 37–47)
HGB BLD-MCNC: 15.9 G/DL (ref 12–16)
IMM GRANULOCYTES # BLD AUTO: 0.04 K/UL (ref 0–0.11)
IMM GRANULOCYTES NFR BLD AUTO: 0.4 % (ref 0–0.9)
LIPASE SERPL-CCNC: 5 U/L (ref 11–82)
LYMPHOCYTES # BLD AUTO: 2.1 K/UL (ref 1–4.8)
LYMPHOCYTES NFR BLD: 19.9 % (ref 22–41)
MCH RBC QN AUTO: 33 PG (ref 27–33)
MCHC RBC AUTO-ENTMCNC: 36.9 G/DL (ref 33.6–35)
MCV RBC AUTO: 89.4 FL (ref 81.4–97.8)
MONOCYTES # BLD AUTO: 0.88 K/UL (ref 0–0.85)
MONOCYTES NFR BLD AUTO: 8.3 % (ref 0–13.4)
NEUTROPHILS # BLD AUTO: 7.52 K/UL (ref 2–7.15)
NEUTROPHILS NFR BLD: 71.1 % (ref 44–72)
NRBC # BLD AUTO: 0 K/UL
NRBC BLD AUTO-RTO: 0 /100 WBC
PLATELET # BLD AUTO: 209 K/UL (ref 164–446)
PMV BLD AUTO: 11.8 FL (ref 9–12.9)
POTASSIUM SERPL-SCNC: 3.3 MMOL/L (ref 3.6–5.5)
PROT SERPL-MCNC: 8.1 G/DL (ref 6–8.2)
RBC # BLD AUTO: 4.82 M/UL (ref 4.2–5.4)
SODIUM SERPL-SCNC: 132 MMOL/L (ref 135–145)
WBC # BLD AUTO: 10.6 K/UL (ref 4.8–10.8)

## 2017-08-08 PROCEDURE — 83690 ASSAY OF LIPASE: CPT

## 2017-08-08 PROCEDURE — 99284 EMERGENCY DEPT VISIT MOD MDM: CPT

## 2017-08-08 PROCEDURE — 96374 THER/PROPH/DIAG INJ IV PUSH: CPT

## 2017-08-08 PROCEDURE — 93005 ELECTROCARDIOGRAM TRACING: CPT

## 2017-08-08 PROCEDURE — 36415 COLL VENOUS BLD VENIPUNCTURE: CPT

## 2017-08-08 PROCEDURE — 700111 HCHG RX REV CODE 636 W/ 250 OVERRIDE (IP): Performed by: EMERGENCY MEDICINE

## 2017-08-08 PROCEDURE — 700105 HCHG RX REV CODE 258: Performed by: EMERGENCY MEDICINE

## 2017-08-08 PROCEDURE — 80053 COMPREHEN METABOLIC PANEL: CPT

## 2017-08-08 PROCEDURE — 85025 COMPLETE CBC W/AUTO DIFF WBC: CPT

## 2017-08-08 RX ORDER — SODIUM CHLORIDE 9 MG/ML
1000 INJECTION, SOLUTION INTRAVENOUS ONCE
Status: COMPLETED | OUTPATIENT
Start: 2017-08-08 | End: 2017-08-08

## 2017-08-08 RX ORDER — HALOPERIDOL 5 MG/ML
2.5 INJECTION INTRAMUSCULAR ONCE
Status: COMPLETED | OUTPATIENT
Start: 2017-08-08 | End: 2017-08-08

## 2017-08-08 RX ADMIN — SODIUM CHLORIDE 1000 ML: 9 INJECTION, SOLUTION INTRAVENOUS at 10:06

## 2017-08-08 RX ADMIN — HALOPERIDOL LACTATE 2.5 MG: 5 INJECTION, SOLUTION INTRAMUSCULAR at 10:06

## 2017-08-08 ASSESSMENT — LIFESTYLE VARIABLES: DO YOU DRINK ALCOHOL: YES

## 2017-08-08 ASSESSMENT — PAIN SCALES - GENERAL: PAINLEVEL_OUTOF10: 9

## 2017-08-08 NOTE — ED AVS SNAPSHOT
8/8/2017    Rupa Yang  [address]  [place] [address]    Dear Rupa:    [facility] wants to ensure your discharge home is safe and you or your loved ones have had all of your questions answered regarding your care after you leave the hospital.    Below is a list of resources and contact information should you have any questions regarding your hospital stay, follow-up instructions, or active medical symptoms.    Questions or Concerns Regarding… Contact   Medical Questions Related to Your Discharge  (7 days a week, 8am-5pm) Contact a Nurse Care Coordinator   [phone]   Medical Questions Not Related to Your Discharge  (24 hours a day / 7 days a week)  Contact the Nurse Health Line   [phone]    Medications or Discharge Instructions Refer to your discharge packet   or contact your [facility] Primary Care Provider   [phone]   Follow-up Appointment(s) Schedule your appointment via [employer]   or contact Scheduling [phone]   Billing Review your statement via [employer]  or contact Billing [phone]   Medical Records Review your records via [employer]   or contact Medical Records [phone]     You may receive a telephone call within two days of discharge. This call is to make certain you understand your discharge instructions and have the opportunity to have any questions answered. You can also easily access your medical information, test results and upcoming appointments via the [employer] online health management tool. You can learn more and sign up at [employer]. For assistance setting up your [employer] account, please call [phone].    Once again, we want to ensure your discharge home is safe and that you have a clear understanding of any next steps in your care. If you have any questions or concerns, please do not hesitate to contact us, we are here for you. Thank you for choosing [facility] for your healthcare needs.    Sincerely,    Your [facility] Healthcare Team

## 2017-08-08 NOTE — DISCHARGE INSTRUCTIONS
You were seen and evaluated in the Emergency Department at [facility] for:     Abdominal pain, nausea, vomiting, shaking episodes.     You had the following tests and studies:    Blood tests are still pending. You wish to leave prior to these results, but your vital signs are stable and you feel better, so please return immediately if you are no better or get any worse. We're always happy to care for you at any time.     You received the following medications:    Haloperidol, IV fluids.     ----------------------------    Please make sure to follow up with:    Your GYN, primary doctor as soon as possible, ideally in 1-2 days.  ----------------------------    We always encourage patients to return IMMEDIATELY if they have:  Increased or changing pain, passing out, fevers over 100.4 (taken in your mouth or rectally) for more than 2 days, redness or swelling of skin or tissues, feeling like your heart is beating fast, chest pain that is new or worsening, trouble breathing, feeling like your throat is closing up and can not breath, inability to walk, weakness of any part of your body, new dizziness, severe bleeding that won't stop from any part of your body, if you can't eat or drink, or if you have any other concerns.   If you feel worse, please know that you can always return with any questions, concerns, worse symptoms, or you are feeling unsafe. We certainly cannot say for sure that we have ruled out every illness or dangerous disease, but we feel that at this specific time, your exam, tests, and vital signs like heart rate and blood pressure are safe for discharge.

## 2017-08-08 NOTE — ED AVS SNAPSHOT
Home Care Instructions                                                                                                                Rupa Yang   MRN: [id number]    Department:  [facility], Emergency Dept   Date of Visit:  8/8/2017            [facility], Emergency Dept    [address] [place] [address]    Phone:  [phone]      You were seen by     Jared Jones M.D.      Your Diagnosis Was     Generalized abdominal pain     R10.84       These are the medications you received during your hospitalization from 08/08/2017 0923 to 08/08/2017 1024     Date/Time Order Dose Route Action    08/08/2017 1006 haloperidol lactate (HALDOL) injection 2.5 mg 2.5 mg Intravenous Given    08/08/2017 1006 NS infusion 1,000 mL 1,000 mL Intravenous New Bag      Follow-up Information     1. Follow up with Your primary clinic and OBGYN. Schedule an appointment as soon as possible for a visit in 2 days.        2. Follow up with [facility], Emergency Dept Today.    Specialty:  Emergency Medicine    Why:  If symptoms worsen    Contact information    [address] [place] [id number] [phone]      Medication Information     Review all of your home medications and newly ordered medications with your primary doctor and/or pharmacist as soon as possible. Follow medication instructions as directed by your doctor and/or pharmacist.     Please keep your complete medication list with you and share with your physician. Update the information when medications are discontinued, doses are changed, or new medications (including over-the-counter products) are added; and carry medication information at all times in the event of emergency situations.               Medication List      ASK your doctor about these medications        Instructions    Morning Afternoon Evening Bedtime    cyclobenzaprine 10 MG Tabs   Commonly known as:  FLEXERIL        Take 1 Tab by  mouth 3 times a day as needed for Muscle Spasms.   Dose:  10 mg                        hydrocodone-acetaminophen 5-325 MG Tabs per tablet   Commonly known as:  NORCO        Take 1 Tab by mouth every four hours as needed.   Dose:  1 Tab                        MIDOL MAX ST MENSTRUAL FORMULA PO        Take 1 Tab by mouth as needed.   Dose:  1 Tab                        omeprazole 20 MG delayed-release capsule   Commonly known as:  PRILOSEC        Take 1 Cap by mouth every day.   Dose:  20 mg                        * ondansetron 4 MG Tbdp   Commonly known as:  ZOFRAN ODT        Take 1 Tab by mouth every 8 hours as needed for Nausea/Vomiting.   Dose:  4 mg                        * ondansetron 4 MG Tbdp   Commonly known as:  ZOFRAN ODT        Take 1 Tab by mouth every 8 hours as needed for Nausea/Vomiting.   Dose:  4 mg                        sucralfate 1 GM Tabs   Commonly known as:  CARAFATE        Take 1 Tab by mouth 4 Times a Day,Before Meals and at Bedtime.   Dose:  1 g                        * Notice:  This list has 2 medication(s) that are the same as other medications prescribed for you. Read the directions carefully, and ask your doctor or other care provider to review them with you.            Procedures and tests performed during your visit     CBC WITH DIFFERENTIAL    CMP    EKG (ER)    LIPASE        Discharge Instructions       You were seen and evaluated in the Emergency Department at [facility] for:     Abdominal pain, nausea, vomiting, shaking episodes.     You had the following tests and studies:    Blood tests are still pending. You wish to leave prior to these results, but your vital signs are stable and you feel better, so please return immediately if you are no better or get any worse. We're always happy to care for you at any time.     You received the following medications:    Haloperidol, IV fluids.     ----------------------------    Please make sure to follow up with:    Your  GYN, primary doctor as soon as possible, ideally in 1-2 days.  ----------------------------    We always encourage patients to return IMMEDIATELY if they have:  Increased or changing pain, passing out, fevers over 100.4 (taken in your mouth or rectally) for more than 2 days, redness or swelling of skin or tissues, feeling like your heart is beating fast, chest pain that is new or worsening, trouble breathing, feeling like your throat is closing up and can not breath, inability to walk, weakness of any part of your body, new dizziness, severe bleeding that won't stop from any part of your body, if you can't eat or drink, or if you have any other concerns.   If you feel worse, please know that you can always return with any questions, concerns, worse symptoms, or you are feeling unsafe. We certainly cannot say for sure that we have ruled out every illness or dangerous disease, but we feel that at this specific time, your exam, tests, and vital signs like heart rate and blood pressure are safe for discharge.               Patient Information     Patient Information    Following emergency treatment: all patient requiring follow-up care must return either to a private physician or a clinic if your condition worsens before you are able to obtain further medical attention, please return to the emergency room.     Billing Information    At [facility], we work to make the billing process streamlined for our patients.  Our Representatives are here to answer any questions you may have regarding your hospital bill.  If you have insurance coverage and have supplied your insurance information to us, we will submit a claim to your insurer on your behalf.  Should you have any questions regarding your bill, we can be reached online or by phone as follows:  Online: You are able pay your bills online or live chat with our representatives about any billing questions you may have. We are here to help Monday - Friday from 8:00am to  7:30pm and 9:00am - 12:00pm on Saturdays.  Please visit [email or web address]  for more information.   Phone:  [phone] or [phone]    Please note that your emergency physician, surgeon, pathologist, radiologist, anesthesiologist, and other specialists are not employed by [facility] and will therefore bill separately for their services.  Please contact them directly for any questions concerning their bills at the numbers below:     Emergency Physician Services:  [phone]  [facility]:  [phone]  [facility]:  [phone]  [facility]:  [phone]    1. Your final bill may vary from the amount quoted upon discharge if all procedures are not complete at that time, or if your doctor has additional procedures of which we are not aware. You will receive an additional bill if you return to the Emergency Department at [facility] for suture removal regardless of the facility of which the sutures were placed.     2. Please arrange for settlement of this account at the emergency registration.    3. All self-pay accounts are due in full at the time of treatment.  If you are unable to meet this obligation then payment is expected within 4-5 days.     4. If you have had radiology studies (CT, X-ray, Ultrasound, MRI), you have received a preliminary result during your emergency department visit. Please contact the radiology department [phone] to receive a copy of your final result. Please discuss the Final result with your primary physician or with the follow up physician provided.     Crisis Hotline:  [place] Crisis Hotline:  5-159-MCVZPKM or 1-884.263.4937  [place] Crisis Hotline:    1-152.719.1931 or 577-913-7221         ED Discharge Follow Up Questions    1. In order to provide you with very good care, we would like to follow up with a phone call in the next few days.  May we have your permission to contact you?     YES  /  NO    2. What is the best phone number to call you? (       )_____-__________    3. What is the best time to call you?      Morning  /  Afternoon  /  Evening                   Patient Signature:  ____________________________________________________________    Date:  ____________________________________________________________

## 2017-08-08 NOTE — ED AVS SNAPSHOT
Provenance Access Code: Activation code not generated  Current Provenance Status: Active    [employer]  A secure, online tool to manage your health information     [employer]’s Provenance® is a secure, online tool that connects you to your personalized health information from the privacy of your home -- day or night - making it very easy for you to manage your healthcare. Once the activation process is completed, you can even access your medical information using the Provenance cathleen, which is available for free in the Apple Cathleen store or Google Play store.     Provenance provides the following levels of access (as shown below):   My Chart Features   [facility] Primary Care Doctor [facility]  Specialists [facility]  Urgent  Care Non-[facility]  Primary Care  Doctor   Email your healthcare team securely and privately 24/7 X X X X   Manage appointments: schedule your next appointment; view details of past/upcoming appointments X      Request prescription refills. X      View recent personal medical records, including lab and immunizations X X X X   View health record, including health history, allergies, medications X X X X   Read reports about your outpatient visits, procedures, consult and ER notes X X X X   See your discharge summary, which is a recap of your hospital and/or ER visit that includes your diagnosis, lab results, and care plan. X X       How to register for Provenance:  1. Go to  [email or web address].  2. Click on the Sign Up Now box, which takes you to the New Member Sign Up page. You will need to provide the following information:  a. Enter your Provenance Access Code exactly as it appears at the top of this page. (You will not need to use this code after you’ve completed the sign-up process. If you do not sign up before the expiration date, you must request a new code.)   b. Enter your date of birth.   c. Enter your home email address.   d. Click Submit, and follow the next screen’s instructions.  3. Create a Provenance ID. This will  be your apprupt login ID and cannot be changed, so think of one that is secure and easy to remember.  4. Create a apprupt password. You can change your password at any time.  5. Enter your Password Reset Question and Answer. This can be used at a later time if you forget your password.   6. Enter your e-mail address. This allows you to receive e-mail notifications when new information is available in apprupt.  7. Click Sign Up. You can now view your health information.    For assistance activating your apprupt account, call [phone]

## 2017-08-08 NOTE — ED NOTES
Pt to room 1 from [facility] for possible seizure. Report 3 seizures this am. Pt minimally responsive.

## 2017-08-08 NOTE — ED PROVIDER NOTES
ED Provider Note    CHIEF COMPLAINT  Chief Complaint   Patient presents with   • Shaking   • Abdominal Cramps       HPI  Rupa Yang is a 26 y.o. female who presents with abdominal pain, acute but recurrent.     Location: Generalized abdominal pain    Quality: Aching and dull, nonradiating    Severity: At worse symptoms are severe, at present symptoms are moderate    Duration: Episodes have been nearly every month, this most recent was onset 4 days ago    Timing: Symptoms are intermittent but always present during recent severity    Context: No noted sugars aside from usually associated with her menstrual cycle. Many prior episodes feels like prior. Pt uses marijuana regularly.    Modifying factors: Denies alleviating factors, attempted home with Norco without relief    Associated signs/symptoms: Nausea and vomiting, poor by mouth tolerance, pertinent negatives include no fevers, chest pain, dyspnea, bowel or bladder symptoms, swelling, hives. The patient notes that she occasionally has seizures with these episodes as described as shaking spells.       REVIEW OF SYSTEMS  See HPI for further details. All other systems are negative.     PAST MEDICAL HISTORY   has a past medical history of Scoliosis; Eczema; Substance abuse; Gastroenteritis; Arrhythmia; Heart burn; Indigestion; Other specified disorder of intestines; Dental disorder; and Pain (02-24-15).    PAST FAMILY HISTORY  Family History   Problem Relation Age of Onset   • Alcohol/Drug Mother      drugs   • Diabetes Mother      NIDDM   • Alcohol/Drug Father      drugs   • Alcohol/Drug Brother    • Hypertension Maternal Aunt    • Diabetes Maternal Aunt      IDDM   • Diabetes Maternal Grandmother      IDDM   • Diabetes Maternal Grandfather      IDDM   • Cancer Paternal Grandmother      breast   • Hypertension Paternal Grandmother        SOCIAL HISTORY  Social History     Social History Main Topics   • Smoking status: Current Every Day Smoker -- 0.50 packs/day  "    Types: Cigarettes   • Smokeless tobacco: Never Used   • Alcohol Use: No   • Drug Use: Not on file      Comment: in past meth, cocaine, weed at age 12 last used at age 13   • Sexual Activity:     Partners: Male      Comment: planned pregnancy FOB involved       SURGICAL HISTORY   has past surgical history that includes primary c section (11/27/2013) and earl by laparoscopy (3/3/2015).    CURRENT MEDICATIONS  Home Medications     **Home medications have not yet been reviewed for this encounter**          ALLERGIES  Allergies   Allergen Reactions   • Divalproex Sodium Hives and Rash         • Risperidone Hives and Rash         • Sertraline Hcl [Zoloft] Hives and Rash         • Tegretol [Carbamazepine] Hives and Rash             PHYSICAL EXAM  VITAL SIGNS: /69 mmHg  Pulse 91  Temp(Src) 37.6 °C (99.7 °F)  Resp 13  Ht 1.626 m (5' 4\")  Wt 54.432 kg (120 lb)  BMI 20.59 kg/m2  SpO2 90% @ANALY[[id number]::@   Pulse ox interpretation: I interpret this pulse ox as normal.  Constitutional: Alert and in no apparent distress.  HENT: No signs of trauma, Bilateral external ears normal, Nose normal.   Eyes: Pupils are equal and reactive, Conjunctiva normal, Non-icteric.   Neck: Normal range of motion, No tenderness, Supple, No stridor.   Lymphatic: No lymphadenopathy noted.   Cardiovascular: Regular rate and rhythm, no murmurs.   Thorax & Lungs: Normal breath sounds, No respiratory distress, No wheezing, No chest tenderness.   Abdomen: Bowel sounds normal, Soft, mild diffuse abdominal tenderness primarily in epigastrium, No masses, No pulsatile masses. No peritoneal signs.  Skin: Warm, Dry, No erythema, No rash.   Back: No bony tenderness, No CVA tenderness.   Extremities: Intact distal pulses, No edema, No tenderness, No cyanosis.  Musculoskeletal: Good range of motion in all major joints. No tenderness to palpation or major deformities noted.   Neurologic: Alert , Normal motor function, Normal sensory function, No focal " deficits noted.   Psychiatric: Affect normal, Judgment normal, Mood normal.       DIAGNOSTIC STUDIES / PROCEDURES    EKG  I personally reviewed the patient's EKG in the absence of a cardiologist and my findings are as follows:     Indication: abd pain    Interpretation: Rate: 89, Rhythm: nsr, Intervals: qtc prolonged, ST Segments: no depression or elevation, T Waves: no concerning inversions.     Impression: long qtc in setting of administration of haloperidol      LABS    Labs Reviewed   COMP METABOLIC PANEL - Abnormal; Notable for the following:     Sodium 132 (*)     Potassium 3.3 (*)     Chloride 94 (*)     All other components within normal limits   LIPASE - Abnormal; Notable for the following:     Lipase 5 (*)     All other components within normal limits   CBC WITH DIFFERENTIAL - Abnormal; Notable for the following:     MCHC 36.9 (*)     Lymphocytes 19.90 (*)     Neutrophils (Absolute) 7.52 (*)     Monos (Absolute) 0.88 (*)     All other components within normal limits   ESTIMATED GFR       COURSE & MEDICAL DECISION MAKING    This is a 26 y.o. female who presents with abdominal pain, nausea, vomiting, and shaking episodes c/w prior. AFVSS.    Differential Diagnosis includes but is not limited to:  Cyclic vomiting, pancreatitis, substance abuse, seizure, endometriosis, menstruation, substance abuse    ED Course:  Plan labs, symptom control, hydration, and reevaluation.  Labs still not results, pt got great relief with haloperidol IV and IV fluids, but the nurse acutely brought to my attention that the patient requests dc home. Her brother was in an accident and wishes to go assist him. EKG with prolonged qt, will not give further qt prolonging drugs. Pt counseled on concern and need for labs, workup, and possible life threats, surgical complications, incontinence, pain, missed diagnoses and she understands these risks.   I have used the following questions to determine the patient's medical decision-making  capacity:     1) Can the patient demonstrate the ability to communicate a choice? yes     2) Does the patient understand his or her potential medical condition(s) and the relevant facts? yes     3) Does the patient understand the available options and the potential consequences of his or her decision? yes     4) Is the decision based on reasoning consistent with the patient’s values and preferences? yes     Based on the above questions, I feel the patient does have decision-making capacity at this time.    She will return for new/worse pain, symptoms, fevers, vomiting, or any other concerns. I asked her to avoid cannabis in case hyperemesis 2/2 cannabis abuse could be an etiology.    9:56 PM  Labs reviewed on chart review and no acute processes noted, will attempt f/u call with pt in AM.     Pertinent Labs & Imaging studies reviewed and verified by myself, as well as nursing notes and the patient's past medical, family, and social histories (See chart for details).    Medications   haloperidol lactate (HALDOL) injection 2.5 mg (2.5 mg Intravenous Given 8/8/17 1006)   NS infusion 1,000 mL (0 mL Intravenous Stopped 8/8/17 1023)       Discharge Medication List as of 8/8/2017 10:24 AM        FINAL IMPRESSION   (R10.84) Generalized abdominal pain  (G43.A0) Non-intractable cyclical vomiting with nausea  (R25.1) Episode of shaking  (R94.31) QT prolongation    Plan:  Dc prior to completion of workup     Results, exam findings, clinical impression, presumed diagnosis, treatment options, and strict return precautions were discussed with the patient, and they verbalized understanding, agreed with, and appreciated the plan of care.    Electronically signed by: Jared Jones, 8/8/2017 9:51 AM

## 2017-08-08 NOTE — ED NOTES
Pt shaking in all extremities, arching back, immediately after pt responding appropriately to questions. No post ictal period observed. Pt states abd pain causes seizures.

## 2017-08-14 ENCOUNTER — HOSPITAL ENCOUNTER (EMERGENCY)
Facility: MEDICAL CENTER | Age: 27
End: 2017-08-14
Payer: COMMERCIAL

## 2017-08-14 VITALS
WEIGHT: 120 LBS | RESPIRATION RATE: 20 BRPM | HEART RATE: 85 BPM | SYSTOLIC BLOOD PRESSURE: 143 MMHG | BODY MASS INDEX: 20.49 KG/M2 | HEIGHT: 64 IN | DIASTOLIC BLOOD PRESSURE: 119 MMHG | TEMPERATURE: 99 F | OXYGEN SATURATION: 95 %

## 2017-08-14 PROCEDURE — 302449 STATCHG TRIAGE ONLY (STATISTIC)

## 2017-08-14 ASSESSMENT — PAIN SCALES - WONG BAKER: WONGBAKER_NUMERICALRESPONSE: DOESN'T HURT AT ALL

## 2017-08-14 NOTE — ED NOTES
Rupa Yang  26 y.o.  Chief Complaint   Patient presents with   • Shaking   • Abdominal Pain     generalized     Patient presents to the ED with above complaints. Has been seen here multiple times this month for the same symptoms. Significant other states that symptoms have been intermittent x 1 week. Patient noted to be laying on the floor of the lobby shaking. Airway intact during episode. No SOB/dyspnea/stridor/cyanosis noted. NO tonic-clonic movements noted. Patient picked up and placed in wheelchair, currently sitting in wheelchair refusing to answer questions asked by this RN. Significant other states that episodes always begin with generalized abdominal pain that leads to shaking.    Triage process explained to patient, Apologized for wait time, and placed in lobby.

## 2017-10-02 ENCOUNTER — APPOINTMENT (OUTPATIENT)
Dept: RADIOLOGY | Facility: MEDICAL CENTER | Age: 27
End: 2017-10-02
Attending: EMERGENCY MEDICINE
Payer: MEDICAID

## 2017-10-02 ENCOUNTER — HOSPITAL ENCOUNTER (EMERGENCY)
Facility: MEDICAL CENTER | Age: 27
End: 2017-10-02
Attending: EMERGENCY MEDICINE
Payer: MEDICAID

## 2017-10-02 VITALS
DIASTOLIC BLOOD PRESSURE: 74 MMHG | SYSTOLIC BLOOD PRESSURE: 116 MMHG | BODY MASS INDEX: 20.49 KG/M2 | OXYGEN SATURATION: 96 % | TEMPERATURE: 99 F | RESPIRATION RATE: 24 BRPM | WEIGHT: 120 LBS | HEART RATE: 74 BPM | HEIGHT: 64 IN

## 2017-10-02 DIAGNOSIS — R11.2 NON-INTRACTABLE VOMITING WITH NAUSEA, UNSPECIFIED VOMITING TYPE: ICD-10-CM

## 2017-10-02 DIAGNOSIS — R10.32 LEFT LOWER QUADRANT PAIN: ICD-10-CM

## 2017-10-02 DIAGNOSIS — F44.5 PSEUDOSEIZURE: ICD-10-CM

## 2017-10-02 LAB
ALBUMIN SERPL BCP-MCNC: 5 G/DL (ref 3.2–4.9)
ALBUMIN/GLOB SERPL: 1.5 G/DL
ALP SERPL-CCNC: 92 U/L (ref 30–99)
ALT SERPL-CCNC: 12 U/L (ref 2–50)
AMPHET UR QL SCN: NEGATIVE
ANION GAP SERPL CALC-SCNC: 18 MMOL/L (ref 0–11.9)
APPEARANCE UR: CLEAR
APTT PPP: 28.4 SEC (ref 24.7–36)
AST SERPL-CCNC: 13 U/L (ref 12–45)
BACTERIA #/AREA URNS HPF: NEGATIVE /HPF
BACTERIA GENITAL QL WET PREP: NORMAL
BARBITURATES UR QL SCN: NEGATIVE
BASOPHILS # BLD AUTO: 0.1 % (ref 0–1.8)
BASOPHILS # BLD: 0.02 K/UL (ref 0–0.12)
BENZODIAZ UR QL SCN: NEGATIVE
BILIRUB SERPL-MCNC: 0.7 MG/DL (ref 0.1–1.5)
BILIRUB UR QL STRIP.AUTO: ABNORMAL
BUN SERPL-MCNC: 26 MG/DL (ref 8–22)
BZE UR QL SCN: NEGATIVE
CALCIUM SERPL-MCNC: 10.2 MG/DL (ref 8.5–10.5)
CANNABINOIDS UR QL SCN: POSITIVE
CHLORIDE SERPL-SCNC: 98 MMOL/L (ref 96–112)
CO2 SERPL-SCNC: 20 MMOL/L (ref 20–33)
COLOR UR: YELLOW
CREAT SERPL-MCNC: 0.69 MG/DL (ref 0.5–1.4)
CULTURE IF INDICATED INDCX: YES UA CULTURE
EOSINOPHIL # BLD AUTO: 0 K/UL (ref 0–0.51)
EOSINOPHIL NFR BLD: 0 % (ref 0–6.9)
EPI CELLS #/AREA URNS HPF: ABNORMAL /HPF
ERYTHROCYTE [DISTWIDTH] IN BLOOD BY AUTOMATED COUNT: 42.9 FL (ref 35.9–50)
ETHANOL BLD-MCNC: 0 G/DL
GFR SERPL CREATININE-BSD FRML MDRD: >60 ML/MIN/1.73 M 2
GLOBULIN SER CALC-MCNC: 3.4 G/DL (ref 1.9–3.5)
GLUCOSE SERPL-MCNC: 119 MG/DL (ref 65–99)
GLUCOSE UR STRIP.AUTO-MCNC: NEGATIVE MG/DL
HCG SERPL QL: NEGATIVE
HCT VFR BLD AUTO: 42 % (ref 37–47)
HGB BLD-MCNC: 15.3 G/DL (ref 12–16)
HYALINE CASTS #/AREA URNS LPF: ABNORMAL /LPF
IMM GRANULOCYTES # BLD AUTO: 0.08 K/UL (ref 0–0.11)
IMM GRANULOCYTES NFR BLD AUTO: 0.5 % (ref 0–0.9)
INR PPP: 1.01 (ref 0.87–1.13)
KETONES UR STRIP.AUTO-MCNC: 100 MG/DL
LACTATE BLD-SCNC: 3.6 MMOL/L (ref 0.5–2)
LEUKOCYTE ESTERASE UR QL STRIP.AUTO: ABNORMAL
LYMPHOCYTES # BLD AUTO: 1.17 K/UL (ref 1–4.8)
LYMPHOCYTES NFR BLD: 7.3 % (ref 22–41)
MCH RBC QN AUTO: 33.3 PG (ref 27–33)
MCHC RBC AUTO-ENTMCNC: 36.4 G/DL (ref 33.6–35)
MCV RBC AUTO: 91.5 FL (ref 81.4–97.8)
METHADONE UR QL SCN: NEGATIVE
MICRO URNS: ABNORMAL
MONOCYTES # BLD AUTO: 0.79 K/UL (ref 0–0.85)
MONOCYTES NFR BLD AUTO: 4.9 % (ref 0–13.4)
NEUTROPHILS # BLD AUTO: 13.99 K/UL (ref 2–7.15)
NEUTROPHILS NFR BLD: 87.2 % (ref 44–72)
NITRITE UR QL STRIP.AUTO: NEGATIVE
NRBC # BLD AUTO: 0 K/UL
NRBC BLD AUTO-RTO: 0 /100 WBC
OPIATES UR QL SCN: NEGATIVE
OXYCODONE UR QL SCN: NEGATIVE
PCP UR QL SCN: NEGATIVE
PH UR STRIP.AUTO: 5 [PH]
PLATELET # BLD AUTO: 250 K/UL (ref 164–446)
PMV BLD AUTO: 10.9 FL (ref 9–12.9)
POTASSIUM SERPL-SCNC: 3.5 MMOL/L (ref 3.6–5.5)
PROPOXYPH UR QL SCN: NEGATIVE
PROT SERPL-MCNC: 8.4 G/DL (ref 6–8.2)
PROT UR QL STRIP: 100 MG/DL
PROTHROMBIN TIME: 13.6 SEC (ref 12–14.6)
RBC # BLD AUTO: 4.59 M/UL (ref 4.2–5.4)
RBC # URNS HPF: ABNORMAL /HPF
RBC UR QL AUTO: ABNORMAL
SIGNIFICANT IND 70042: NORMAL
SITE SITE: NORMAL
SODIUM SERPL-SCNC: 136 MMOL/L (ref 135–145)
SOURCE SOURCE: NORMAL
SP GR UR STRIP.AUTO: 1.02
TRANS CELLS #/AREA URNS HPF: ABNORMAL /HPF
UROBILINOGEN UR STRIP.AUTO-MCNC: 2 MG/DL
WBC # BLD AUTO: 16.1 K/UL (ref 4.8–10.8)
WBC #/AREA URNS HPF: ABNORMAL /HPF

## 2017-10-02 PROCEDURE — 304562 HCHG STAT O2 MASK/CANNULA

## 2017-10-02 PROCEDURE — 700105 HCHG RX REV CODE 258: Performed by: EMERGENCY MEDICINE

## 2017-10-02 PROCEDURE — A9270 NON-COVERED ITEM OR SERVICE: HCPCS | Performed by: EMERGENCY MEDICINE

## 2017-10-02 PROCEDURE — 81001 URINALYSIS AUTO W/SCOPE: CPT | Mod: 59

## 2017-10-02 PROCEDURE — 83605 ASSAY OF LACTIC ACID: CPT

## 2017-10-02 PROCEDURE — 96374 THER/PROPH/DIAG INJ IV PUSH: CPT

## 2017-10-02 PROCEDURE — 36415 COLL VENOUS BLD VENIPUNCTURE: CPT

## 2017-10-02 PROCEDURE — 96375 TX/PRO/DX INJ NEW DRUG ADDON: CPT

## 2017-10-02 PROCEDURE — 76830 TRANSVAGINAL US NON-OB: CPT

## 2017-10-02 PROCEDURE — 700111 HCHG RX REV CODE 636 W/ 250 OVERRIDE (IP): Performed by: EMERGENCY MEDICINE

## 2017-10-02 PROCEDURE — 700102 HCHG RX REV CODE 250 W/ 637 OVERRIDE(OP): Performed by: EMERGENCY MEDICINE

## 2017-10-02 PROCEDURE — 87491 CHLMYD TRACH DNA AMP PROBE: CPT

## 2017-10-02 PROCEDURE — 87086 URINE CULTURE/COLONY COUNT: CPT

## 2017-10-02 PROCEDURE — 85730 THROMBOPLASTIN TIME PARTIAL: CPT

## 2017-10-02 PROCEDURE — 85610 PROTHROMBIN TIME: CPT

## 2017-10-02 PROCEDURE — 87591 N.GONORRHOEAE DNA AMP PROB: CPT

## 2017-10-02 PROCEDURE — 99285 EMERGENCY DEPT VISIT HI MDM: CPT

## 2017-10-02 PROCEDURE — 96361 HYDRATE IV INFUSION ADD-ON: CPT

## 2017-10-02 PROCEDURE — 80307 DRUG TEST PRSMV CHEM ANLYZR: CPT

## 2017-10-02 PROCEDURE — 84703 CHORIONIC GONADOTROPIN ASSAY: CPT

## 2017-10-02 PROCEDURE — 80053 COMPREHEN METABOLIC PANEL: CPT

## 2017-10-02 PROCEDURE — 85025 COMPLETE CBC W/AUTO DIFF WBC: CPT

## 2017-10-02 PROCEDURE — 700111 HCHG RX REV CODE 636 W/ 250 OVERRIDE (IP)

## 2017-10-02 RX ORDER — LORAZEPAM 2 MG/ML
1 INJECTION INTRAMUSCULAR ONCE
Status: DISCONTINUED | OUTPATIENT
Start: 2017-10-02 | End: 2017-10-02 | Stop reason: HOSPADM

## 2017-10-02 RX ORDER — HYDROCODONE BITARTRATE AND ACETAMINOPHEN 5; 325 MG/1; MG/1
2 TABLET ORAL ONCE
Status: COMPLETED | OUTPATIENT
Start: 2017-10-02 | End: 2017-10-02

## 2017-10-02 RX ORDER — ONDANSETRON 4 MG/1
4 TABLET, ORALLY DISINTEGRATING ORAL EVERY 8 HOURS PRN
Qty: 10 TAB | Refills: 0 | Status: ON HOLD | OUTPATIENT
Start: 2017-10-02 | End: 2017-11-07

## 2017-10-02 RX ORDER — HYDROCODONE BITARTRATE AND ACETAMINOPHEN 5; 325 MG/1; MG/1
1-2 TABLET ORAL EVERY 4 HOURS PRN
Qty: 10 TAB | Refills: 0 | Status: SHIPPED | OUTPATIENT
Start: 2017-10-02 | End: 2018-03-05

## 2017-10-02 RX ORDER — SODIUM CHLORIDE 9 MG/ML
1000 INJECTION, SOLUTION INTRAVENOUS ONCE
Status: COMPLETED | OUTPATIENT
Start: 2017-10-02 | End: 2017-10-02

## 2017-10-02 RX ORDER — LORAZEPAM 2 MG/ML
INJECTION INTRAMUSCULAR
Status: COMPLETED
Start: 2017-10-02 | End: 2017-10-02

## 2017-10-02 RX ORDER — KETOROLAC TROMETHAMINE 30 MG/ML
30 INJECTION, SOLUTION INTRAMUSCULAR; INTRAVENOUS ONCE
Status: COMPLETED | OUTPATIENT
Start: 2017-10-02 | End: 2017-10-02

## 2017-10-02 RX ORDER — ONDANSETRON 2 MG/ML
4 INJECTION INTRAMUSCULAR; INTRAVENOUS
Status: DISCONTINUED | OUTPATIENT
Start: 2017-10-02 | End: 2017-10-02 | Stop reason: HOSPADM

## 2017-10-02 RX ORDER — LORAZEPAM 2 MG/ML
0.5 INJECTION INTRAMUSCULAR ONCE
Status: COMPLETED | OUTPATIENT
Start: 2017-10-02 | End: 2017-10-02

## 2017-10-02 RX ADMIN — ONDANSETRON 4 MG: 2 INJECTION INTRAMUSCULAR; INTRAVENOUS at 16:55

## 2017-10-02 RX ADMIN — LORAZEPAM 1 MG: 2 INJECTION INTRAMUSCULAR; INTRAVENOUS at 15:30

## 2017-10-02 RX ADMIN — KETOROLAC TROMETHAMINE 30 MG: 30 INJECTION, SOLUTION INTRAMUSCULAR at 16:56

## 2017-10-02 RX ADMIN — HYDROCODONE BITARTRATE AND ACETAMINOPHEN 2 TABLET: 5; 325 TABLET ORAL at 20:13

## 2017-10-02 RX ADMIN — LORAZEPAM 1 MG: 2 INJECTION INTRAMUSCULAR at 15:30

## 2017-10-02 RX ADMIN — SODIUM CHLORIDE 1000 ML: 9 INJECTION, SOLUTION INTRAVENOUS at 16:55

## 2017-10-02 NOTE — ED NOTES
Pt to R 1 from [facility]. Pt started seizing in lobby, taken immediately back on [facility]. Pt immediately started seizing again in room. Nasal cannula placed, suction available.  Connected to monitor, PIV established, labs drawn. ERP bedside

## 2017-10-02 NOTE — ED PROVIDER NOTES
"ED Provider Note    CHIEF COMPLAINT  Chief Complaint   Patient presents with   • Seizure       HPI  Rupa Yang is a 27 y.o. female who presents for 3 days of abdominal pain with nausea and frequent vomiting she can't quantify. Patient had a shaking episode in triage and again in the room but is noted to have no postictal phase. She reports a history of pseudoseizures. Patient had 2 while I was in the room and was not postictal afterwards. Shaking was rhythmic. Patient states she has abdominal pain every month that is thought to be endometriosis. She has no appointment pending with OB via the [facility]. She denies fever although has chills. No diarrhea or constipation. Status post cholecystectomy. Denies pregnancy.    REVIEW OF SYSTEMS  Pertinent positives include: Abdominal pain, shaking, nausea, vomiting, dysuria.  Pertinent negatives include: Fever, diarrhea, constipation, peptic dose of disease, gastritis, pancreatitis, diverticulitis.  10+ systems reviewed and negative.      PAST MEDICAL HISTORY  Past Medical History:   Diagnosis Date   • Pain 02-24-15    chronic back, abd., 2/10   • Arrhythmia     \"it feels like palpitations\"   • Dental disorder     \"my teeth are completely dead. Exposed nerves\"   • Eczema     arms, neck, mild currently   • Gastroenteritis    • Heart burn    • Indigestion    • Other specified disorder of intestines     diarrhea   • Scoliosis     dx age 10, chirpractor told severe curvature, left leg shorter.    • Substance abuse     meth, coke, heroin, marijuana stopped after parents OD when she age 12.        FAMILY HISTORY  Family History   Problem Relation Age of Onset   • Alcohol/Drug Mother      drugs   • Diabetes Mother      NIDDM   • Alcohol/Drug Father      drugs   • Alcohol/Drug Brother    • Hypertension Maternal Aunt    • Diabetes Maternal Aunt      IDDM   • Diabetes Maternal Grandmother      IDDM   • Diabetes Maternal Grandfather      IDDM   • Cancer Paternal Grandmother     " " breast   • Hypertension Paternal Grandmother        SOCIAL HISTORY  Social History   Substance Use Topics   • Smoking status: Current Every Day Smoker     Packs/day: 0.50     Types: Cigarettes   • Smokeless tobacco: Never Used   • Alcohol use No     History   Drug use: Unknown     Comment: in past meth, cocaine, weed at age 12 last used at age 13       SURGICAL HISTORY  Past Surgical History:   Procedure Laterality Date   • JUDY BY LAPAROSCOPY  3/3/2015    Procedure: JUDY BY LAPAROSCOPY;  Surgeon: Aftab Churchill M.D.;  Location: SURGERY [facility];  Service:    • PRIMARY C SECTION  11/27/2013    Performed by Haider Garcia M.D. at LABOR AND DELIVERY       CURRENT MEDICATIONS    Current Outpatient Prescriptions   Medication Sig Dispense Refill   • ondansetron (ZOFRAN ODT) 4 MG TABLET DISPERSIBLE Take 1 Tab by mouth every 8 hours as needed for Nausea/Vomiting. 20 Tab 0   • ondansetron (ZOFRAN ODT) 4 MG TABLET DISPERSIBLE Take 1 Tab by mouth every 8 hours as needed for Nausea/Vomiting. 20 Tab 0   • cyclobenzaprine (FLEXERIL) 10 MG Tab Take 1 Tab by mouth 3 times a day as needed for Muscle Spasms. 20 Tab 0   • omeprazole (PRILOSEC) 20 MG delayed-release capsule Take 1 Cap by mouth every day. 30 Cap 11   • sucralfate (CARAFATE) 1 GM Tab Take 1 Tab by mouth 4 Times a Day,Before Meals and at Bedtime. 44 Tab 4   • hydrocodone-acetaminophen (NORCO) 5-325 MG TABS per tablet Take 1 Tab by mouth every four hours as needed. 10 Tab 0   • Acetaminophen-Caff-Pyrilamine (MIDOL MAX ST MENSTRUAL FORMULA PO) Take 1 Tab by mouth as needed.         ALLERGIES  Allergies   Allergen Reactions   • Divalproex Sodium Hives and Rash         • Risperidone Hives and Rash         • Sertraline Hcl [Zoloft] Hives and Rash         • Tegretol [Carbamazepine] Hives and Rash             PHYSICAL EXAM  VITAL SIGNS: /74   Pulse 64   Temp 37.2 °C (99 °F)   Resp 16   Ht 1.626 m (5' 4\")   Wt 54.4 kg (120 lb)   SpO2 97%   BMI 20.60 kg/m²   " Reviewed andNormal including afebrile  Constitutional: Well developed, Well nourished, shaking rhythmically then stopped after I spoke to her and not postictal.  HENT: Normocephalic, atraumatic, bilateral external ears normal, oropharynx moist, No exudates or erythema. No tongue laceration  Eyes: PERRLA, conjunctiva pink, no scleral icterus.   Cardiovascular: Regular S1 and S2 without murmur, rub, gallop.  Respiratory: No rales, rhonchi, wheeze.  Gastrointestinal: Soft, bilateral lower quadrant tenderness without rebound or guarding. No masses or distention.. Perineum normal, no discharge, small red material in loss. Uterus normal size and nontender. Left greater than right adnexal tenderness without masses  Skin: No erythema, no rash.   Genitourinary:  No costovertebral angle tenderness.   Neurologic: Alert & oriented x 3, cranial nerves 2-12 intact by passive exam.  No focal deficit noted.  Psychiatric: Affect normal, Judgment normal, Mood normal.     DIFFERENTIAL DIAGNOSIS:  Endometriosis, PID, appendicitis, diverticulitis, UTI, pseudoseizure, seizure.    RADIOLOGY/PROCEDURES  US-GYN-PELVIS TRANSVAGINAL    (Results Pending)       LABORATORY:  Results for orders placed or performed during the hospital encounter of 10/02/17   HCG Qual Serum   Result Value Ref Range    Beta-Hcg Qualitative Serum Negative Negative   CBC WITH DIFFERENTIAL   Result Value Ref Range    WBC 16.1 (H) 4.8 - 10.8 K/uL    RBC 4.59 4.20 - 5.40 M/uL    Hemoglobin 15.3 12.0 - 16.0 g/dL    Hematocrit 42.0 37.0 - 47.0 %    MCV 91.5 81.4 - 97.8 fL    MCH 33.3 (H) 27.0 - 33.0 pg    MCHC 36.4 (H) 33.6 - 35.0 g/dL    RDW 42.9 35.9 - 50.0 fL    Platelet Count 250 164 - 446 K/uL    MPV 10.9 9.0 - 12.9 fL    Neutrophils-Polys 87.20 (H) 44.00 - 72.00 %    Lymphocytes 7.30 (L) 22.00 - 41.00 %    Monocytes 4.90 0.00 - 13.40 %    Eosinophils 0.00 0.00 - 6.90 %    Basophils 0.10 0.00 - 1.80 %    Immature Granulocytes 0.50 0.00 - 0.90 %    Nucleated RBC 0.00  /100 WBC    Neutrophils (Absolute) 13.99 (H) 2.00 - 7.15 K/uL    Lymphs (Absolute) 1.17 1.00 - 4.80 K/uL    Monos (Absolute) 0.79 0.00 - 0.85 K/uL    Eos (Absolute) 0.00 0.00 - 0.51 K/uL    Baso (Absolute) 0.02 0.00 - 0.12 K/uL    Immature Granulocytes (abs) 0.08 0.00 - 0.11 K/uL    NRBC (Absolute) 0.00 K/uL   COMP METABOLIC PANEL   Result Value Ref Range    Sodium 136 135 - 145 mmol/L    Potassium 3.5 (L) 3.6 - 5.5 mmol/L    Chloride 98 96 - 112 mmol/L    Co2 20 20 - 33 mmol/L    Anion Gap 18.0 (H) 0.0 - 11.9    Glucose 119 (H) 65 - 99 mg/dL    Bun 26 (H) 8 - 22 mg/dL    Creatinine 0.69 0.50 - 1.40 mg/dL    Calcium 10.2 8.5 - 10.5 mg/dL    AST(SGOT) 13 12 - 45 U/L    ALT(SGPT) 12 2 - 50 U/L    Alkaline Phosphatase 92 30 - 99 U/L    Total Bilirubin 0.7 0.1 - 1.5 mg/dL    Albumin 5.0 (H) 3.2 - 4.9 g/dL    Total Protein 8.4 (H) 6.0 - 8.2 g/dL    Globulin 3.4 1.9 - 3.5 g/dL    A-G Ratio 1.5 g/dL   PROTHROMBIN TIME   Result Value Ref Range    PT 13.6 12.0 - 14.6 sec    INR 1.01 0.87 - 1.13   APTT   Result Value Ref Range    APTT 28.4 24.7 - 36.0 sec   LACTIC ACID   Result Value Ref Range    Lactic Acid 3.6 (H) 0.5 - 2.0 mmol/L   URINALYSIS CULTURE, IF INDICATED   Result Value Ref Range    Micro Urine Req Microscopic     Color Yellow     Character Clear     Specific Gravity 1.025 <1.035    Ph 5.0 5.0 - 8.0    Glucose Negative Negative mg/dL    Ketones 100 (A) Negative mg/dL    Protein 100 (A) Negative mg/dL    Bilirubin Moderate (A) Negative    Urobilinogen, Urine 2.0 Negative    Nitrite Negative Negative    Leukocyte Esterase Trace (A) Negative    Occult Blood Moderate (A) Negative    Culture Indicated Yes UA Culture   URINE DRUG SCREEN   Result Value Ref Range    Amphetamines Urine Negative Negative    Barbiturates Negative Negative    Benzodiazepines Negative Negative    Cocaine Metabolite Negative Negative    Methadone Negative Negative    Opiates Negative Negative    Oxycodone Negative Negative    Phencyclidine -Pcp  Negative Negative    Propoxyphene Negative Negative    Cannabinoid Metab Positive (A) Negative   DIAGNOSTIC ALCOHOL   Result Value Ref Range    Diagnostic Alcohol 0.00 0.00 g/dL   URINE MICROSCOPIC (W/UA)   Result Value Ref Range    WBC 5-10 (A) /hpf    RBC 5-10 (A) /hpf    Bacteria Negative None /hpf    Epithelial Cells Many (A) /hpf    Trans Epithelial Cells Few /hpf    Hyaline Cast 6-10 (A) /lpf   CHLAMYDIA & GC BY PCR   Result Value Ref Range    Source Vaginal        INTERVENTIONS:  Medications   lorazepam (ATIVAN) injection 0.5 mg (1 mg Intravenous Given 10/2/17 1530)   ketorolac (TORADOL) injection 30 mg (30 mg Intravenous Given 10/2/17 1656)   NS infusion 1,000 mL (0 mL Intravenous Stopped 10/2/17 1755)   hydrocodone-acetaminophen (NORCO) 5-325 MG per tablet 2 Tab (2 Tabs Oral Given 10/2/17 2013)     Response:Improvement in pain with Toradol.    COURSE & MEDICAL DECISION MAKING  This patient presents with left lower quadrant and left adnexal pain with vomiting and a history of probable endometriosis. Tonight she has leukocytosis and an elevated lactic acid. There is no evidence of PID or tubo-ovarian abscess on pelvic exam and ultrasound. Per chart review CT abdomen and pelvis last year demonstrated no diverticulitis. Symptoms are unlike renal colic which the patient did not have on her prior CT. Her urine appears to be contaminated. There is no evidence of ovarian cyst or hemorrhagic cyst. Since the pain is on the left-she has appendicitis. At this point I see no indication for repeat CT given location of pain and results of ultrasound. Patient also has pseudoseizures with stopped after my evaluation and no history of true seizure disorder..    PLAN:  Discharge Medication List as of 10/2/2017  8:10 PM      START taking these medications   !! ondansetron (ZOFRAN ODT) 4 MG TABLET DISPERSIBLE Take 1 Tab by mouth every 8 hours as needed for Nausea/Vomiting., Disp-10 Tab, R-0, Print Rx Paper      !!  hydrocodone-acetaminophen (NORCO) 5-325 MG Tab per tablet Take 1-2 Tabs by mouth every four hours as needed (mild pain)., Disp-10 Tab, R-0, Print Rx Paper     Ibuprofen  Abdominal pain handout given  Return for worsening pain, uncontrolled vomiting, bleeding, ill appearance  prescription monitoring accessed and score 130    Lawanda Jonas M.D.  [address] [facility] [address]  [place] [id number] [phone]    Schedule an appointment as soon as possible for a visit in 1 week        CONDITION: Stable, improved.    FINAL IMPRESSION  1. Left lower quadrant pain    2. Pseudoseizure    3. Non-intractable vomiting with nausea, unspecified vomiting type          Electronically signed by: Satnam Pope, 10/2/2017 3:48 PM

## 2017-10-02 NOTE — ED NOTES
Pt verbalizing, answering questions to ERP immediately after seizure. States she has been dehydrated, has endometriosis. Reports abdominal pain.

## 2017-10-03 LAB
C TRACH DNA SPEC QL NAA+PROBE: NEGATIVE
N GONORRHOEA DNA SPEC QL NAA+PROBE: NEGATIVE
SPECIMEN SOURCE: NORMAL

## 2017-10-03 NOTE — ED NOTES
Patient given discharge paperwork and verbalized understanding. Patient out of ED ambulatory with herself. Patient in stable condition and vitals stable and no distress noted.

## 2017-10-03 NOTE — DISCHARGE INSTRUCTIONS
Abdominal Pain, Extended Version   Belly Pain, Stomach Pain    Take a clear fluid diet 12 hours and use ibuprofen 600mg every 6 hours for pain.  Add hydrocodone for more severe pain.   Return to the ED  for worsening pain (especially in the lower right abdomen), pain that is worse with movement, uncontrolled vomiting, new fever, bleeding or ill appearance.  Followup with gyn.    You had an elevated or high normal blood pressure reading today.  This does not necessarily mean you have hypertension.  Please followup with your/a primary physician for comprehensive blood pressure evaluation.  BP Readings from Last 3 Encounters:   10/02/17 116/74   08/14/17 143/119   08/08/17 115/69         Your exam may not have shown the exact reason you have abdominal pain.  Since there are many different causes of abdominal pain, another checkup and more tests may be needed. One of the many possible causes of abdominal pain in any person who has not had their appendix removed is Acute Appendicitis.  Appendicitis is often a very difficult to diagnosis. Normal blood tests, urine tests, and even ultrasound and CT can not ensure there is not an early appendicitis. Sometimes only the changes which occur over time will allow appendicitis and other causes of abdominal pain to be determined.  Because of this, it is important you follow all of the instructions below.                                                                                                Home care instructions  Ø Rest.  Ø Do not eat solid food until your pain is gone.  Ø While You Have Pain:  Stay on a clear liquid diet.  A clear liquid is one you can see through (water, weak tea, broth or bouillon, ginger ale, Jell-o, Juan-Aid, Gatorade, apple juice, popsicles or ice chips).   Ø When Your Pain is Gone:  Start a light diet (dry toast, crackers, applesauce, white rice, bananas, broth or bouillon) and increase the diet slowly, as long as it does not bother you.  No dairy  products (including cheese and eggs) and no spicy, fatty, fried or high fiber foods.  Ø No alcohol, caffeine or cigarettes.  Ø Take your regular medicines unless the caregiver told you not to.  Ø Take any prescribed medicine as directed.  Ø Do not take medicine containing aspirin, ibuprofen (Advil® / Motrin® ), naprosyn/naproxen (Aleve®) or ketoprofen (Orudis® ) unless told to by your own caregiver.    seek immediate medical attention if :  Ø Your pain is not gone in 8-12 hours.  Ø Your pain becomes worse, changes location or feels different.  Ø You have a fever or shaking chills.  Ø You keep throwing up or cannot drink liquids.   Ø You see blood when you throw up or see blood in your bowel movements.    Ø Your bowel movements become dark or black.  Ø You move your bowels frequently.  Ø Your bowel movements stop (become blocked) or you cannot pass gas.  Ø You have bloody, frequent or painful urination (“passing water”).  Ø Your skin or the whites of your eyes look yellow.  Ø Your stomach becomes bloated or bigger.  Ø You notice bleeding or discharge from your vagina.  Ø You have dizziness or fainting.  Ø You have chest or back pain.   Ø Anything else worries you.  Ø You become short of breath.    If you have questions or concerns, please call your caregiver.     Adapted from ©2001  [employer] Aftercare Instruction Sheets  Last reviewed July 12, 2005, [employer], creator of [employer] Patient Information System.    ExitCare® Patient Information ©2007 [employer].

## 2017-10-05 LAB
BACTERIA UR CULT: NORMAL
SIGNIFICANT IND 70042: NORMAL
SITE SITE: NORMAL
SOURCE SOURCE: NORMAL

## 2017-11-07 ENCOUNTER — RESOLUTE PROFESSIONAL BILLING HOSPITAL PROF FEE (OUTPATIENT)
Dept: HOSPITALIST | Facility: MEDICAL CENTER | Age: 27
End: 2017-11-07
Payer: MEDICAID

## 2017-11-07 ENCOUNTER — APPOINTMENT (OUTPATIENT)
Dept: RADIOLOGY | Facility: MEDICAL CENTER | Age: 27
End: 2017-11-07
Attending: EMERGENCY MEDICINE
Payer: MEDICAID

## 2017-11-07 ENCOUNTER — HOSPITAL ENCOUNTER (OUTPATIENT)
Facility: MEDICAL CENTER | Age: 27
End: 2017-11-07
Attending: EMERGENCY MEDICINE | Admitting: HOSPITALIST
Payer: MEDICAID

## 2017-11-07 VITALS
HEART RATE: 91 BPM | BODY MASS INDEX: 20.49 KG/M2 | RESPIRATION RATE: 24 BRPM | DIASTOLIC BLOOD PRESSURE: 84 MMHG | TEMPERATURE: 98 F | HEIGHT: 64 IN | SYSTOLIC BLOOD PRESSURE: 117 MMHG | WEIGHT: 120 LBS | OXYGEN SATURATION: 93 %

## 2017-11-07 DIAGNOSIS — R11.15 NON-INTRACTABLE CYCLICAL VOMITING WITHOUT NAUSEA: ICD-10-CM

## 2017-11-07 DIAGNOSIS — F44.5 PSEUDOSEIZURE: ICD-10-CM

## 2017-11-07 PROBLEM — D72.829 LEUKOCYTOSIS: Status: ACTIVE | Noted: 2017-11-07

## 2017-11-07 PROBLEM — R11.10 INTRACTABLE VOMITING: Status: ACTIVE | Noted: 2017-11-07

## 2017-11-07 LAB
ALBUMIN SERPL BCP-MCNC: 4.8 G/DL (ref 3.2–4.9)
ALBUMIN/GLOB SERPL: 1.5 G/DL
ALP SERPL-CCNC: 90 U/L (ref 30–99)
ALT SERPL-CCNC: 12 U/L (ref 2–50)
AMPHET UR QL SCN: NEGATIVE
ANION GAP SERPL CALC-SCNC: 9 MMOL/L (ref 0–11.9)
APPEARANCE UR: ABNORMAL
AST SERPL-CCNC: 14 U/L (ref 12–45)
BACTERIA #/AREA URNS HPF: NEGATIVE /HPF
BARBITURATES UR QL SCN: NEGATIVE
BASOPHILS # BLD AUTO: 0.3 % (ref 0–1.8)
BASOPHILS # BLD: 0.05 K/UL (ref 0–0.12)
BENZODIAZ UR QL SCN: POSITIVE
BILIRUB SERPL-MCNC: 0.5 MG/DL (ref 0.1–1.5)
BILIRUB UR QL STRIP.AUTO: NEGATIVE
BUN SERPL-MCNC: 20 MG/DL (ref 8–22)
BZE UR QL SCN: NEGATIVE
C TRACH DNA SPEC QL NAA+PROBE: NEGATIVE
CALCIUM SERPL-MCNC: 10.2 MG/DL (ref 8.5–10.5)
CANNABINOIDS UR QL SCN: POSITIVE
CAOX CRY #/AREA URNS HPF: ABNORMAL /HPF
CHLORIDE SERPL-SCNC: 109 MMOL/L (ref 96–112)
CO2 SERPL-SCNC: 22 MMOL/L (ref 20–33)
COLOR UR: ABNORMAL
CREAT SERPL-MCNC: 0.63 MG/DL (ref 0.5–1.4)
EKG IMPRESSION: NORMAL
EOSINOPHIL # BLD AUTO: 0.02 K/UL (ref 0–0.51)
EOSINOPHIL NFR BLD: 0.1 % (ref 0–6.9)
EPI CELLS #/AREA URNS HPF: ABNORMAL /HPF
ERYTHROCYTE [DISTWIDTH] IN BLOOD BY AUTOMATED COUNT: 45.7 FL (ref 35.9–50)
GFR SERPL CREATININE-BSD FRML MDRD: >60 ML/MIN/1.73 M 2
GLOBULIN SER CALC-MCNC: 3.2 G/DL (ref 1.9–3.5)
GLUCOSE SERPL-MCNC: 159 MG/DL (ref 65–99)
GLUCOSE UR STRIP.AUTO-MCNC: NEGATIVE MG/DL
HCG SERPL QL: NEGATIVE
HCT VFR BLD AUTO: 43.7 % (ref 37–47)
HGB BLD-MCNC: 15.1 G/DL (ref 12–16)
HYALINE CASTS #/AREA URNS LPF: ABNORMAL /LPF
IMM GRANULOCYTES # BLD AUTO: 0.07 K/UL (ref 0–0.11)
IMM GRANULOCYTES NFR BLD AUTO: 0.4 % (ref 0–0.9)
KETONES UR STRIP.AUTO-MCNC: 15 MG/DL
LEUKOCYTE ESTERASE UR QL STRIP.AUTO: ABNORMAL
LYMPHOCYTES # BLD AUTO: 1.84 K/UL (ref 1–4.8)
LYMPHOCYTES NFR BLD: 11.8 % (ref 22–41)
MCH RBC QN AUTO: 32.8 PG (ref 27–33)
MCHC RBC AUTO-ENTMCNC: 34.6 G/DL (ref 33.6–35)
MCV RBC AUTO: 94.8 FL (ref 81.4–97.8)
METHADONE UR QL SCN: NEGATIVE
MICRO URNS: ABNORMAL
MONOCYTES # BLD AUTO: 0.63 K/UL (ref 0–0.85)
MONOCYTES NFR BLD AUTO: 4 % (ref 0–13.4)
MUCOUS THREADS #/AREA URNS HPF: ABNORMAL /HPF
N GONORRHOEA DNA SPEC QL NAA+PROBE: NEGATIVE
NEUTROPHILS # BLD AUTO: 12.96 K/UL (ref 2–7.15)
NEUTROPHILS NFR BLD: 83.4 % (ref 44–72)
NITRITE UR QL STRIP.AUTO: NEGATIVE
NRBC # BLD AUTO: 0 K/UL
NRBC BLD AUTO-RTO: 0 /100 WBC
OPIATES UR QL SCN: POSITIVE
OXYCODONE UR QL SCN: NEGATIVE
PCP UR QL SCN: NEGATIVE
PH UR STRIP.AUTO: 5 [PH]
PLATELET # BLD AUTO: 240 K/UL (ref 164–446)
PMV BLD AUTO: 10.6 FL (ref 9–12.9)
POTASSIUM SERPL-SCNC: 4 MMOL/L (ref 3.6–5.5)
PROPOXYPH UR QL SCN: NEGATIVE
PROT SERPL-MCNC: 8 G/DL (ref 6–8.2)
PROT UR QL STRIP: 100 MG/DL
RBC # BLD AUTO: 4.61 M/UL (ref 4.2–5.4)
RBC # URNS HPF: ABNORMAL /HPF
RBC UR QL AUTO: ABNORMAL
SODIUM SERPL-SCNC: 140 MMOL/L (ref 135–145)
SP GR UR STRIP.AUTO: 1.03
SPECIMEN SOURCE: NORMAL
UROBILINOGEN UR STRIP.AUTO-MCNC: 1 MG/DL
WBC # BLD AUTO: 15.6 K/UL (ref 4.8–10.8)
WBC #/AREA URNS HPF: ABNORMAL /HPF

## 2017-11-07 PROCEDURE — 80307 DRUG TEST PRSMV CHEM ANLYZR: CPT

## 2017-11-07 PROCEDURE — 96375 TX/PRO/DX INJ NEW DRUG ADDON: CPT

## 2017-11-07 PROCEDURE — 700111 HCHG RX REV CODE 636 W/ 250 OVERRIDE (IP): Performed by: EMERGENCY MEDICINE

## 2017-11-07 PROCEDURE — 71010 DX-CHEST-PORTABLE (1 VIEW): CPT

## 2017-11-07 PROCEDURE — G0378 HOSPITAL OBSERVATION PER HR: HCPCS

## 2017-11-07 PROCEDURE — 700105 HCHG RX REV CODE 258: Performed by: STUDENT IN AN ORGANIZED HEALTH CARE EDUCATION/TRAINING PROGRAM

## 2017-11-07 PROCEDURE — 85025 COMPLETE CBC W/AUTO DIFF WBC: CPT

## 2017-11-07 PROCEDURE — 96361 HYDRATE IV INFUSION ADD-ON: CPT

## 2017-11-07 PROCEDURE — 81001 URINALYSIS AUTO W/SCOPE: CPT | Mod: 59

## 2017-11-07 PROCEDURE — 87491 CHLMYD TRACH DNA AMP PROBE: CPT

## 2017-11-07 PROCEDURE — 700111 HCHG RX REV CODE 636 W/ 250 OVERRIDE (IP): Performed by: STUDENT IN AN ORGANIZED HEALTH CARE EDUCATION/TRAINING PROGRAM

## 2017-11-07 PROCEDURE — 700102 HCHG RX REV CODE 250 W/ 637 OVERRIDE(OP): Performed by: STUDENT IN AN ORGANIZED HEALTH CARE EDUCATION/TRAINING PROGRAM

## 2017-11-07 PROCEDURE — 80053 COMPREHEN METABOLIC PANEL: CPT

## 2017-11-07 PROCEDURE — 87591 N.GONORRHOEAE DNA AMP PROB: CPT

## 2017-11-07 PROCEDURE — 96374 THER/PROPH/DIAG INJ IV PUSH: CPT

## 2017-11-07 PROCEDURE — 84703 CHORIONIC GONADOTROPIN ASSAY: CPT

## 2017-11-07 PROCEDURE — 74000 DX-ABDOMEN-1 VIEW: CPT

## 2017-11-07 PROCEDURE — 93005 ELECTROCARDIOGRAM TRACING: CPT | Performed by: STUDENT IN AN ORGANIZED HEALTH CARE EDUCATION/TRAINING PROGRAM

## 2017-11-07 PROCEDURE — 93010 ELECTROCARDIOGRAM REPORT: CPT | Performed by: INTERNAL MEDICINE

## 2017-11-07 PROCEDURE — 99220 PR INITIAL OBSERVATION CARE,LEVL III: CPT | Performed by: HOSPITALIST

## 2017-11-07 PROCEDURE — 99285 EMERGENCY DEPT VISIT HI MDM: CPT

## 2017-11-07 PROCEDURE — A9270 NON-COVERED ITEM OR SERVICE: HCPCS | Performed by: STUDENT IN AN ORGANIZED HEALTH CARE EDUCATION/TRAINING PROGRAM

## 2017-11-07 RX ORDER — SODIUM CHLORIDE 9 MG/ML
1000 INJECTION, SOLUTION INTRAVENOUS ONCE
Status: DISCONTINUED | OUTPATIENT
Start: 2017-11-07 | End: 2017-11-07

## 2017-11-07 RX ORDER — POLYETHYLENE GLYCOL 3350 17 G/17G
1 POWDER, FOR SOLUTION ORAL
Status: DISCONTINUED | OUTPATIENT
Start: 2017-11-07 | End: 2017-11-07 | Stop reason: HOSPADM

## 2017-11-07 RX ORDER — PROMETHAZINE HYDROCHLORIDE 12.5 MG/1
12.5-25 SUPPOSITORY RECTAL EVERY 4 HOURS PRN
Status: DISCONTINUED | OUTPATIENT
Start: 2017-11-07 | End: 2017-11-07 | Stop reason: HOSPADM

## 2017-11-07 RX ORDER — BISACODYL 10 MG
10 SUPPOSITORY, RECTAL RECTAL
Status: DISCONTINUED | OUTPATIENT
Start: 2017-11-07 | End: 2017-11-07 | Stop reason: HOSPADM

## 2017-11-07 RX ORDER — IBUPROFEN 800 MG/1
400 TABLET ORAL EVERY 6 HOURS PRN
Status: DISCONTINUED | OUTPATIENT
Start: 2017-11-07 | End: 2017-11-07 | Stop reason: HOSPADM

## 2017-11-07 RX ORDER — DEXTROSE MONOHYDRATE 25 G/50ML
25 INJECTION, SOLUTION INTRAVENOUS
Status: DISCONTINUED | OUTPATIENT
Start: 2017-11-07 | End: 2017-11-07 | Stop reason: HOSPADM

## 2017-11-07 RX ORDER — SODIUM CHLORIDE 9 MG/ML
2000 INJECTION, SOLUTION INTRAVENOUS CONTINUOUS
Status: DISCONTINUED | OUTPATIENT
Start: 2017-11-07 | End: 2017-11-07 | Stop reason: HOSPADM

## 2017-11-07 RX ORDER — LORAZEPAM 2 MG/ML
0.5 INJECTION INTRAMUSCULAR ONCE
Status: COMPLETED | OUTPATIENT
Start: 2017-11-07 | End: 2017-11-07

## 2017-11-07 RX ORDER — ONDANSETRON 4 MG/1
4 TABLET, ORALLY DISINTEGRATING ORAL EVERY 6 HOURS PRN
Qty: 10 TAB | Refills: 0 | Status: SHIPPED | OUTPATIENT
Start: 2017-11-07 | End: 2017-12-27

## 2017-11-07 RX ORDER — ONDANSETRON 2 MG/ML
4 INJECTION INTRAMUSCULAR; INTRAVENOUS ONCE
Status: COMPLETED | OUTPATIENT
Start: 2017-11-07 | End: 2017-11-07

## 2017-11-07 RX ORDER — AMOXICILLIN 250 MG
2 CAPSULE ORAL 2 TIMES DAILY
Status: DISCONTINUED | OUTPATIENT
Start: 2017-11-07 | End: 2017-11-07 | Stop reason: HOSPADM

## 2017-11-07 RX ORDER — NICOTINE 21 MG/24HR
14 PATCH, TRANSDERMAL 24 HOURS TRANSDERMAL
Status: DISCONTINUED | OUTPATIENT
Start: 2017-11-07 | End: 2017-11-07 | Stop reason: HOSPADM

## 2017-11-07 RX ORDER — MORPHINE SULFATE 4 MG/ML
1 INJECTION, SOLUTION INTRAMUSCULAR; INTRAVENOUS EVERY 4 HOURS PRN
Status: DISCONTINUED | OUTPATIENT
Start: 2017-11-07 | End: 2017-11-07 | Stop reason: HOSPADM

## 2017-11-07 RX ORDER — PROMETHAZINE HYDROCHLORIDE 25 MG/1
12.5-25 TABLET ORAL EVERY 4 HOURS PRN
Status: DISCONTINUED | OUTPATIENT
Start: 2017-11-07 | End: 2017-11-07 | Stop reason: HOSPADM

## 2017-11-07 RX ADMIN — LORAZEPAM 0.5 MG: 2 INJECTION INTRAMUSCULAR; INTRAVENOUS at 07:03

## 2017-11-07 RX ADMIN — NICOTINE 14 MG: 14 PATCH, EXTENDED RELEASE TOPICAL at 07:15

## 2017-11-07 RX ADMIN — ENOXAPARIN SODIUM 40 MG: 100 INJECTION SUBCUTANEOUS at 09:00

## 2017-11-07 RX ADMIN — IBUPROFEN 400 MG: 200 TABLET, FILM COATED ORAL at 05:30

## 2017-11-07 RX ADMIN — ONDANSETRON 4 MG: 2 INJECTION INTRAMUSCULAR; INTRAVENOUS at 03:00

## 2017-11-07 RX ADMIN — SODIUM CHLORIDE 2000 ML: 9 INJECTION, SOLUTION INTRAVENOUS at 05:32

## 2017-11-07 ASSESSMENT — ENCOUNTER SYMPTOMS
HEADACHES: 0
BACK PAIN: 0
DIAPHORESIS: 1
HEARTBURN: 0
SORE THROAT: 0
DIZZINESS: 1
ABDOMINAL PAIN: 1
VOMITING: 1
PHOTOPHOBIA: 0
FEVER: 1
SPEECH CHANGE: 0
COUGH: 1
SEIZURES: 0
BLURRED VISION: 0
DIARRHEA: 0
BRUISES/BLEEDS EASILY: 0
CHILLS: 0
FOCAL WEAKNESS: 0
BLOOD IN STOOL: 1
CONSTIPATION: 0
DOUBLE VISION: 0
PALPITATIONS: 1
NAUSEA: 1
SENSORY CHANGE: 0

## 2017-11-07 ASSESSMENT — COPD QUESTIONNAIRES
COPD SCREENING SCORE: 0
DO YOU EVER COUGH UP ANY MUCUS OR PHLEGM?: NO/ONLY WITH OCCASIONAL COLDS OR INFECTIONS
HAVE YOU SMOKED AT LEAST 100 CIGARETTES IN YOUR ENTIRE LIFE: NO/DON'T KNOW
DURING THE PAST 4 WEEKS HOW MUCH DID YOU FEEL SHORT OF BREATH: NONE/LITTLE OF THE TIME

## 2017-11-07 ASSESSMENT — COGNITIVE AND FUNCTIONAL STATUS - GENERAL
SUGGESTED CMS G CODE MODIFIER MOBILITY: CH
MOBILITY SCORE: 24
DAILY ACTIVITIY SCORE: 24
SUGGESTED CMS G CODE MODIFIER DAILY ACTIVITY: CH

## 2017-11-07 ASSESSMENT — PAIN SCALES - GENERAL: PAINLEVEL_OUTOF10: 0

## 2017-11-07 ASSESSMENT — PATIENT HEALTH QUESTIONNAIRE - PHQ9
SUM OF ALL RESPONSES TO PHQ9 QUESTIONS 1 AND 2: 0
2. FEELING DOWN, DEPRESSED, IRRITABLE, OR HOPELESS: NOT AT ALL
1. LITTLE INTEREST OR PLEASURE IN DOING THINGS: NOT AT ALL
SUM OF ALL RESPONSES TO PHQ QUESTIONS 1-9: 0

## 2017-11-07 ASSESSMENT — LIFESTYLE VARIABLES
DO YOU DRINK ALCOHOL: NO
SUBSTANCE_ABUSE: 0

## 2017-11-07 NOTE — PROGRESS NOTES
Attempted to hang IV fluid 1 liter prior to d/c. Pt up out of bed at sink pulling Iv. [facility] made aware. Okay to discharge Pt at this time. Pt to get meds for n/v and discharge after.

## 2017-11-07 NOTE — CARE PLAN
Problem: Safety  Goal: Will remain free from injury    Intervention: Educate patient and significant other/support system about adaptive mobility strategies and safe transfers  Attempted to discuss safety, call bell in reach, seizure precautions in place, bed alarm on.

## 2017-11-07 NOTE — ASSESSMENT & PLAN NOTE
WBC of 15.6  On history and exam no real sign of infection  UA ordered- denies dysuria  Chlamydia/GC ordered  Chest x-ray - no acute findings

## 2017-11-07 NOTE — ED NOTES
Pt continues to have seizure- like-activity, pt is able to respond verbally w/o difficulty during shaking. Pt requesting pain medication, ativan, and zofran, ERP aware, no new orders received.

## 2017-11-07 NOTE — ED NOTES
ERP at bedside. Pt continues to have seizure-like-activity every minute for approximately 20 secs. No LOC, no vomiting, no loss of control of bowels or bladder.

## 2017-11-07 NOTE — ED NOTES
Pt states that she has to urinate, this RN informed pt that she should use a bed pan for safety r/t recent sz activity, pt insistent on ambulating to bathroom, pt ambulated to bathroom w/o assistance, states that she needs privacy, pt informed that this RN will stay with pt for safety, pt very uncooperative at this time.

## 2017-11-07 NOTE — ED PROVIDER NOTES
ED Provider Note    CHIEF COMPLAINT  Chief Complaint   Patient presents with   • Seizure     per EMS, pt came in for seizures, on EMS arrivel pt had 3 to 4 seizres lasting 30 seconds. pt A&O 4 right after.        HPI  Rupa Yang is a 27 y.o. female Here for evaluation of abdominal pain and pseudoseizure. The patient was picked up outside of her home, where was noted that she had 3-4 seizures lasting a few seconds, and then spontaneously resolving. The patient does not have any post ictal phase, and comes right out of the seizures, and returns back to normal.  She has chronic vomiting, but denies any current abdominal pain.  No fever.  No cp, no sob.     PAST MEDICAL HISTORY   has a past medical history of Arrhythmia; Dental disorder; Eczema; Gastroenteritis; Heart burn; Indigestion; Other specified disorder of intestines; Pain (02-24-15); Scoliosis; and Substance abuse.    SOCIAL HISTORY  Social History     Social History Main Topics   • Smoking status: Current Every Day Smoker     Packs/day: 0.50     Types: Cigarettes   • Smokeless tobacco: Never Used   • Alcohol use No   • Drug use: Unknown      Comment: in past meth, cocaine, weed at age 12 last used at age 13   • Sexual activity: Yes     Partners: Male      Comment: planned pregnancy FOB involved       SURGICAL HISTORY   has a past surgical history that includes primary c section (11/27/2013) and earl by laparoscopy (3/3/2015).    CURRENT MEDICATIONS  Home Medications    **Home medications have not yet been reviewed for this encounter**         ALLERGIES  Allergies   Allergen Reactions   • Divalproex Sodium Hives and Rash         • Risperidone Hives and Rash         • Sertraline Hcl [Zoloft] Hives and Rash         • Tegretol [Carbamazepine] Hives and Rash             REVIEW OF SYSTEMS  See HPI for further details. Review of systems as above, otherwise all other systems are negative.     PHYSICAL EXAM  Constitutional:Disheveled, well-nourished  HEENT:  Normocephalic, atraumatic. Posterior pharynx clear and moist.  Eyes:  EOMI. Normal sclera.  Neck: Supple, Full range of motion, nontender.  Chest/Pulmonary: clear to ausculation. Symmetrical expansion.   Cardio: Regular rate and rhythm with no murmur.   Abdomen: Soft, nontender. No peritoneal signs. No guarding. No palpable masses.  Back: No CVA tenderness, nontender midline, no step offs.  Musculoskeletal: No deformity, no edema, neurovascular intact.   Neuro: Clear speech, cranial nerves II-XII grossly intact.  Multiple episodes of rhythmic shaking, without postictal phase.  No focal deficits. CN II-XII grossly intact.  Psych: agitated, aggressive.     Results for orders placed or performed during the hospital encounter of 11/07/17   CBC WITH DIFFERENTIAL   Result Value Ref Range    WBC 15.6 (H) 4.8 - 10.8 K/uL    RBC 4.61 4.20 - 5.40 M/uL    Hemoglobin 15.1 12.0 - 16.0 g/dL    Hematocrit 43.7 37.0 - 47.0 %    MCV 94.8 81.4 - 97.8 fL    MCH 32.8 27.0 - 33.0 pg    MCHC 34.6 33.6 - 35.0 g/dL    RDW 45.7 35.9 - 50.0 fL    Platelet Count 240 164 - 446 K/uL    MPV 10.6 9.0 - 12.9 fL    Neutrophils-Polys 83.40 (H) 44.00 - 72.00 %    Lymphocytes 11.80 (L) 22.00 - 41.00 %    Monocytes 4.00 0.00 - 13.40 %    Eosinophils 0.10 0.00 - 6.90 %    Basophils 0.30 0.00 - 1.80 %    Immature Granulocytes 0.40 0.00 - 0.90 %    Nucleated RBC 0.00 /100 WBC    Neutrophils (Absolute) 12.96 (H) 2.00 - 7.15 K/uL    Lymphs (Absolute) 1.84 1.00 - 4.80 K/uL    Monos (Absolute) 0.63 0.00 - 0.85 K/uL    Eos (Absolute) 0.02 0.00 - 0.51 K/uL    Baso (Absolute) 0.05 0.00 - 0.12 K/uL    Immature Granulocytes (abs) 0.07 0.00 - 0.11 K/uL    NRBC (Absolute) 0.00 K/uL   COMP METABOLIC PANEL   Result Value Ref Range    Sodium 140 135 - 145 mmol/L    Potassium 4.0 3.6 - 5.5 mmol/L    Chloride 109 96 - 112 mmol/L    Co2 22 20 - 33 mmol/L    Anion Gap 9.0 0.0 - 11.9    Glucose 159 (H) 65 - 99 mg/dL    Bun 20 8 - 22 mg/dL    Creatinine 0.63 0.50 - 1.40  mg/dL    Calcium 10.2 8.5 - 10.5 mg/dL    AST(SGOT) 14 12 - 45 U/L    ALT(SGPT) 12 2 - 50 U/L    Alkaline Phosphatase 90 30 - 99 U/L    Total Bilirubin 0.5 0.1 - 1.5 mg/dL    Albumin 4.8 3.2 - 4.9 g/dL    Total Protein 8.0 6.0 - 8.2 g/dL    Globulin 3.2 1.9 - 3.5 g/dL    A-G Ratio 1.5 g/dL   BETA-HCG QUALITATIVE SERUM   Result Value Ref Range    Beta-Hcg Qualitative Serum Negative Negative   ESTIMATED GFR   Result Value Ref Range    GFR If African American >60 >60 mL/min/1.73 m 2    GFR If Non African American >60 >60 mL/min/1.73 m 2     DX-CHEST-PORTABLE (1 VIEW)    (Results Pending)   ON-[id number]-3 VIEW    (Results Pending)         PROCEDURES     MEDICAL RECORD  I have reviewed patient's medical record and pertinent results are listed above.    COURSE & MEDICAL DECISION MAKING  I have reviewed any medical record information, laboratory studies and radiographic results as noted above.    3:52 AM  The pt has had multiple episodes of vomiting.  I will admit her for cyclical vomiting and iv fluid hydration.  She also continues to have her pseudoseizures, however these have improved since she has been here.  She did not have any head trauma, and she has a long history of these, with a history of CTscan.     FINAL IMPRESSION  1. Non-intractable cyclical vomiting without nausea    2. Pseudoseizure        Electronically signed by: Jose Martínez, 11/7/2017 2:24 AM

## 2017-11-07 NOTE — DISCHARGE SUMMARY
Internal Medicine Discharge Summary  Note Author: Deanna Tan M.D.       Admit Date:  11/7/2017       Discharge Date:   11/7/17    Service:   [facility] Internal Medicine Red Team  Attending Physician(s):   Jere       Senior Resident(s):   Dominick  Bruce Resident(s):   Shurthi      Primary Diagnosis:   Cyclic vomting    Secondary Diagnoses:                Principal Problem:    Intractable nausea and vomiting POA: Yes  Active Problems:    Abdominal pain POA: Yes    Intractable vomiting POA: Unknown    Leukocytosis POA: Yes    Hospital Summary (Brief Narrative):       Patient is a 27-year-old lady who was brought in by [facility] due to what they felt was seizures. Patient was also complaining of cyclic vomiting in the ED. She has a long history of polysubstance abuse including marijuana, cocaine and heroin. During admission patient admitted to using heroin cocaine and marijuana before [facility] found her. She has had multiple recent admissions for pseudoseizures (these have been ruled out as actual seizures by video EEG and neurology recommendations) and cyclic vomiting. Patient has been educated multiple times (including this visit as well) that continuing polysubstance abuse will only worsen her condition. During hospitalization she was given IV fluids (although only received minimal amount due to her pulling out IVs), Ativan, and antiemetics. Patient was given clear liquid diet and discharged with instructions to avoid marijuana as this increases her cyclic vomiting. Patient endorsed understanding, but stated she will continue as she feels this helps her vomiting.     Patient /Hospital Summary (Details -- Problem Oriented) :          Abdominal pain   Assessment & Plan    On exam nonspecific abdominal tenderness  Denies diarrhea and constipation.   Gives h/o blood in stool - blood covers stool and is on the toilet paper. Has severe pain associated with each defecation for past month or so  Abdomen x-ray - No  abnormally distended loops of air-filled bowel bowel are evident to suggest bowel obstruction. There is no evidence of free intraperitoneal air. Cholecystectomy clips are present.  Plan:  Advance diet as tolerated - DC after receiving 1L IVF      Leukocytosis   Assessment & Plan    WBC of 15.6 - likely reactive  On history and exam no real sign of infection  UA ordered- denies dysuria  Chlamydia/GC ordered - PCP to follow  Chest x-ray - no acute findings      * Intractable nausea and vomiting   Assessment & Plan    She has had 4 episodes of vomiting per day with no blood in vomitus and has not been able to keep anything down for the past 2-3 days.  It is associated with abdominal pain and occurs once every month with her period  Plan:  promethazine  IV fluids        Consultants:     none    Procedures:        none    Imaging/ Testing:      [id number] VIEW   Final Result      No evidence of bowel obstruction.                  DX-CHEST-PORTABLE (1 VIEW)   Final Result      No acute cardiac or pulmonary abnormalities are identified.          Discharge Medications:         Medication Reconciliation: Completed     Medication List      CHANGE how you take these medications      Instructions   ondansetron 4 MG Tbdp  What changed:  · when to take this  · reasons to take this  · Another medication with the same name was removed. Continue taking this medication, and follow the directions you see here.  Commonly known as:  ZOFRAN ODT   Take 1 Tab by mouth every 6 hours as needed for Nausea.  Dose:  4 mg        CONTINUE taking these medications      Instructions   cyclobenzaprine 10 MG Tabs  Commonly known as:  FLEXERIL   Take 1 Tab by mouth 3 times a day as needed for Muscle Spasms.  Dose:  10 mg     * hydrocodone-acetaminophen 5-325 MG Tabs per tablet  Commonly known as:  NORCO   Take 1-2 Tabs by mouth every four hours as needed (mild pain).  Dose:  1-2 Tab     * hydrocodone-acetaminophen 5-325 MG Tabs per tablet  Commonly  known as:  NORCO   Take 1-2 Tabs by mouth every four hours as needed (mild pain).  Dose:  1-2 Tab     MIDOL MAX ST MENSTRUAL FORMULA PO   Take 1 Tab by mouth as needed.  Dose:  1 Tab     omeprazole 20 MG delayed-release capsule  Commonly known as:  PRILOSEC   Take 1 Cap by mouth every day.  Dose:  20 mg     sucralfate 1 GM Tabs  Commonly known as:  CARAFATE   Take 1 Tab by mouth 4 Times a Day,Before Meals and at Bedtime.  Dose:  1 g        * This list has 2 medication(s) that are the same as other medications prescribed for you. Read the directions carefully, and ask your doctor or other care provider to review them with you.                Disposition:   home    Diet:   Liquid and advance as tolerated    Activity:   As tolerated    Instructions:       The patient was instructed to return to the ER in the event of worsening symptoms. I have counseled the patient on the importance of compliance and the patient has agreed to proceed with all medical recommendations and follow up plan indicated above.   The patient understands that all medications come with benefits and risks. Risks may include permanent injury or death and these risks can be minimized with close reassessment and monitoring.        Primary Care Provider:    Needs to call and establish with [facility]  Discharge summary faxed to primary care provider:  Deferred  Copy of discharge summary given to the patient: Deferred      Follow up appointment details :      Needs to call [facility] and establish care    Pending Studies:        none    Time spent on discharge day patient visit, preparing discharge paperwork and arranging for patient follow up.    Summary of follow up issues:   Polysubstance abuse    Discharge Time (Minutes) :    >45min  Hospital Course Type: Observation Stay      Condition on Discharge    ______________________________________________________________________    Interval history/exam for day of discharge:    Patient awake and placed on liquids to  advance as tolerated. Advised to no longer do marijuana since it increases her vomiting.      Vitals:    11/07/17 0157 11/07/17 0530 11/07/17 0800 11/07/17 0900   BP: 124/80  132/93 117/84   Pulse: (!) 110 96 (!) 105 91   Resp: 20 12 17 (!) 24   Temp: 35.9 °C (96.6 °F)  36.5 °C (97.7 °F) 36.7 °C (98 °F)   SpO2:   93% 93%   Weight:       Height:         Weight/BMI: Body mass index is 20.6 kg/m².  Pulse Oximetry: 93 %, O2 (LPM): 0, O2 Delivery: None (Room Air)    General: generalized shaking while awake and talking. Cachectic, not cooperative.   CVS: regular rate and rhythm. No murmurs   PULM: clear auscultation, no wheezes or crackles.     Most Recent Labs:    Lab Results   Component Value Date/Time    WBC 15.6 (H) 11/07/2017 02:32 AM    RBC 4.61 11/07/2017 02:32 AM    HEMOGLOBIN 15.1 11/07/2017 02:32 AM    HEMATOCRIT 43.7 11/07/2017 02:32 AM    MCV 94.8 11/07/2017 02:32 AM    MCH 32.8 11/07/2017 02:32 AM    MCHC 34.6 11/07/2017 02:32 AM    MPV 10.6 11/07/2017 02:32 AM    NEUTSPOLYS 83.40 (H) 11/07/2017 02:32 AM    LYMPHOCYTES 11.80 (L) 11/07/2017 02:32 AM    MONOCYTES 4.00 11/07/2017 02:32 AM    EOSINOPHILS 0.10 11/07/2017 02:32 AM    BASOPHILS 0.30 11/07/2017 02:32 AM    HYPOCHROMIA 1+ 01/28/2013 09:13 AM    ANISOCYTOSIS 1+ 05/13/2013 10:21 AM      Lab Results   Component Value Date/Time    SODIUM 140 11/07/2017 02:32 AM    POTASSIUM 4.0 11/07/2017 02:32 AM    CHLORIDE 109 11/07/2017 02:32 AM    CO2 22 11/07/2017 02:32 AM    GLUCOSE 159 (H) 11/07/2017 02:32 AM    BUN 20 11/07/2017 02:32 AM    CREATININE 0.63 11/07/2017 02:32 AM      Lab Results   Component Value Date/Time    ALTSGPT 12 11/07/2017 02:32 AM    ASTSGOT 14 11/07/2017 02:32 AM    ALKPHOSPHAT 90 11/07/2017 02:32 AM    TBILIRUBIN 0.5 11/07/2017 02:32 AM    LIPASE 5 (L) 08/08/2017 09:30 AM    ALBUMIN 4.8 11/07/2017 02:32 AM    GLOBULIN 3.2 11/07/2017 02:32 AM    INR 1.01 10/02/2017 03:30 PM     Lab Results   Component Value Date/Time    PROTHROMBTM 13.6  10/02/2017 03:30 PM    INR 1.01 10/02/2017 03:30 PM      Deanna Tan MD    The patient was seen by me face to face. I personally had a discussion with the patient. The case was discussed with our resident team, I examined the patient and confirmed the essential components of the history, physical examination, diagnosis and treatment plan as needed. I agree with the patient care as documented by the resident and edited as above by me. See resident's note above for complete details of service. The overall treatment regimen will be carried out as described above.     Thank you:  Sridhar Oquendo MD, FACP  [facility] Internal Medicine  Pager: Use Tiger Text (use resident info under treatment team for day to day floor issues)  Office: [phone] Ext. [phone]  Fax: [phone] (non PHI only)

## 2017-11-07 NOTE — PROGRESS NOTES
I walked on the floor and patient actively thrashing on [facility]. Multiple CNA's and RN's at bedside with transport. Charge nurse and current nurse discussing POC and making calls to doctors and NAM to decide best plan of care. [place] bed, seizure precautions implemented, rails padded. Soft restraints were considered. 0.5mg Ativan one time order was collected and given via charge nurse. Report called to Neuro RN Symone. Transport called and taking patient in her bed.

## 2017-11-07 NOTE — CARE PLAN
Problem: Communication  Goal: The ability to communicate needs accurately and effectively will improve    Intervention: Educate patient and significant other/support system about the plan of care, procedures, treatments, medications and allow for questions  Attempted to discuss Pt POC, Pt lethargic and unwilling to learn at this time

## 2017-11-07 NOTE — H&P
Internal Medicine Admitting History and Physical    Note Author: Bill De Jesus M.D.       Name Rupa Yang     1990   Age/Sex 27 y.o. female   MRN [id number]   Code Status Full     After 5PM or if no immediate response to page, please call for cross-coverage  Attending/Team: Dr. Oquendo / naye See Patient List for primary contact information  Call [phone] to page    1st Call - Day Intern (R1):   Shruthi 2nd Call - Day Sr. Resident (R2/R3):   Dominick       Chief Complaint:  Nausea, vomiting, abdominal pain    HPI:  26 y/o female with a past medical history of pseudoseizures presents with cyclical abdominal pain during her period associated with nausea and vomiting for the past 2-3 days. It is 9-10/10 dull and achy abdominal pain with episodes of sharp increases in the pain. She takes ibuprofen for the pain, but that does not really help. She has had 4 episodes of vomiting per day with no blood in vomitus and has not been able to keep anything down for the past 2-3 days.     Patient believes she has a fever and cant stop sweating. Denies chills. She complains of vaginal itching, but denies dysuria, urgency, or frequency. She has non odorous vaginal discharge that is cream colored, slightly thick discharge, but per patient she has always had that.      Per patient she has body shaking when she is sick. Throughout the history taking and exam, patient had body shaking every few minutes that resolved on its own. Patient had video EEG and was diagnosed with pseudoseizures in 2015.       Review of Systems   Constitutional: Positive for diaphoresis, fever and malaise/fatigue. Negative for chills.   HENT: Positive for congestion. Negative for sore throat.    Eyes: Negative for blurred vision, double vision and photophobia.   Respiratory: Positive for cough.    Cardiovascular: Positive for palpitations. Negative for chest pain and leg swelling.   Gastrointestinal: Positive for abdominal pain, blood in stool  "(covers stool and is on the toilet paper. pain associated with each defecation for past month or so), nausea and vomiting. Negative for constipation (Last BM was yesterday.  ), diarrhea, heartburn and melena.   Genitourinary: Negative for dysuria and urgency.   Musculoskeletal: Negative for back pain and joint pain.   Skin: Positive for itching. Negative for rash.   Neurological: Positive for dizziness. Negative for sensory change, speech change, focal weakness, seizures and headaches.   Endo/Heme/Allergies: Does not bruise/bleed easily.   Psychiatric/Behavioral: Negative for substance abuse (smoking).             Past Medical History:   Past Medical History:   Diagnosis Date   • Arrhythmia     \"it feels like palpitations\"   • Dental disorder     \"my teeth are completely dead. Exposed nerves\"   • Eczema     arms, neck, mild currently   • Gastroenteritis    • Heart burn    • Indigestion    • Other specified disorder of intestines     diarrhea   • Pain 02-24-15    chronic back, abd., 2/10   • Scoliosis     dx age 10, chirpractor told severe curvature, left leg shorter.    • Substance abuse     meth, coke, heroin, marijuana stopped after parents OD when she age 12.        Past Surgical History:  Past Surgical History:   Procedure Laterality Date   • JUDY BY LAPAROSCOPY  3/3/2015    Procedure: JUDY BY LAPAROSCOPY;  Surgeon: Aftab Churchill M.D.;  Location: SURGERY [facility];  Service:    • PRIMARY C SECTION  11/27/2013    Performed by Haider Garcia M.D. at LABOR AND DELIVERY       Current Outpatient Medications:  Home Medications    **Home medications have not yet been reviewed for this encounter**     Per patient she takes no medications on regular basis.       Medication Allergy/Sensitivities:  Allergies   Allergen Reactions   • Divalproex Sodium Hives and Rash         • Risperidone Hives and Rash         • Sertraline Hcl [Zoloft] Hives and Rash         • Tegretol [Carbamazepine] Hives and Rash       " "        Family History:  Family History   Problem Relation Age of Onset   • Alcohol/Drug Mother      drugs   • Diabetes Mother      NIDDM   • Alcohol/Drug Father      drugs   • Alcohol/Drug Brother    • Hypertension Maternal Aunt    • Diabetes Maternal Aunt      IDDM   • Diabetes Maternal Grandmother      IDDM   • Diabetes Maternal Grandfather      IDDM   • Cancer Paternal Grandmother      breast   • Hypertension Paternal Grandmother        Social History:  Social History     Social History   • Marital status:      Spouse name: N/A   • Number of children: N/A   • Years of education: N/A     Occupational History   • Not on file.     Social History Main Topics   • Smoking status: Current Every Day Smoker     Packs/day: 0.50     Types: Cigarettes   • Smokeless tobacco: Never Used   • Alcohol use No   • Drug use: Unknown      Comment: in past meth, cocaine, weed at age 12 last used at age 13   • Sexual activity: Yes     Partners: Male      Comment: planned pregnancy FOB involved     Other Topics Concern   • Not on file     Social History Narrative   • No narrative on file     PCP : Pcp Not In Computer      Physical Exam     Vitals:    11/07/17 0151 11/07/17 0157 11/07/17 0530   BP:  124/80    Pulse:  (!) 110 96   Resp:  20 12   Temp:  35.9 °C (96.6 °F)    Weight: 54.4 kg (120 lb)     Height: 1.626 m (5' 4\")       Body mass index is 20.6 kg/m².  /80   Pulse 96   Temp 35.9 °C (96.6 °F)   Resp 12   Ht 1.626 m (5' 4\")   Wt 54.4 kg (120 lb)   BMI 20.60 kg/m²   O2 therapy: O2 (LPM): 0, O2 Delivery: None (Room Air)    Physical Exam   Constitutional: She is oriented to person, place, and time and well-developed, well-nourished, and in no distress. No distress.   HENT:   Head: Normocephalic and atraumatic.   Mouth/Throat: No oropharyngeal exudate.   Dry mucous membranes   Eyes: Conjunctivae and EOM are normal. Pupils are equal, round, and reactive to light. No scleral icterus.   Neck: Normal range of motion. " Neck supple.   Cardiovascular: Normal rate and regular rhythm.  Exam reveals no friction rub.    No murmur heard.  Pulmonary/Chest: Effort normal and breath sounds normal. No respiratory distress. She has no rales.   Abdominal: Soft. Bowel sounds are normal. She exhibits no distension. There is no tenderness. There is no guarding.   Genitourinary: Vagina normal and cervix normal. No vaginal discharge found.   Genitourinary Comments: PV performed, no vaginal discharge or blood on glove  Mild discomfort, no adnexal tenderness.    Musculoskeletal: Normal range of motion. She exhibits no edema, tenderness or deformity.   Neurological: She is alert and oriented to person, place, and time. She has normal reflexes. No cranial nerve deficit. She exhibits normal muscle tone.   Skin: Skin is warm. No rash noted. She is diaphoretic. No erythema.   Nursing note and vitals reviewed.            Data Review       Old Records Request:   Deferred  Current Records review and summary: Completed    Lab Data Review:  Recent Results (from the past 24 hour(s))   CBC WITH DIFFERENTIAL    Collection Time: 11/07/17  2:32 AM   Result Value Ref Range    WBC 15.6 (H) 4.8 - 10.8 K/uL    RBC 4.61 4.20 - 5.40 M/uL    Hemoglobin 15.1 12.0 - 16.0 g/dL    Hematocrit 43.7 37.0 - 47.0 %    MCV 94.8 81.4 - 97.8 fL    MCH 32.8 27.0 - 33.0 pg    MCHC 34.6 33.6 - 35.0 g/dL    RDW 45.7 35.9 - 50.0 fL    Platelet Count 240 164 - 446 K/uL    MPV 10.6 9.0 - 12.9 fL    Neutrophils-Polys 83.40 (H) 44.00 - 72.00 %    Lymphocytes 11.80 (L) 22.00 - 41.00 %    Monocytes 4.00 0.00 - 13.40 %    Eosinophils 0.10 0.00 - 6.90 %    Basophils 0.30 0.00 - 1.80 %    Immature Granulocytes 0.40 0.00 - 0.90 %    Nucleated RBC 0.00 /100 WBC    Neutrophils (Absolute) 12.96 (H) 2.00 - 7.15 K/uL    Lymphs (Absolute) 1.84 1.00 - 4.80 K/uL    Monos (Absolute) 0.63 0.00 - 0.85 K/uL    Eos (Absolute) 0.02 0.00 - 0.51 K/uL    Baso (Absolute) 0.05 0.00 - 0.12 K/uL    Immature  Granulocytes (abs) 0.07 0.00 - 0.11 K/uL    NRBC (Absolute) 0.00 K/uL   COMP METABOLIC PANEL    Collection Time: 11/07/17  2:32 AM   Result Value Ref Range    Sodium 140 135 - 145 mmol/L    Potassium 4.0 3.6 - 5.5 mmol/L    Chloride 109 96 - 112 mmol/L    Co2 22 20 - 33 mmol/L    Anion Gap 9.0 0.0 - 11.9    Glucose 159 (H) 65 - 99 mg/dL    Bun 20 8 - 22 mg/dL    Creatinine 0.63 0.50 - 1.40 mg/dL    Calcium 10.2 8.5 - 10.5 mg/dL    AST(SGOT) 14 12 - 45 U/L    ALT(SGPT) 12 2 - 50 U/L    Alkaline Phosphatase 90 30 - 99 U/L    Total Bilirubin 0.5 0.1 - 1.5 mg/dL    Albumin 4.8 3.2 - 4.9 g/dL    Total Protein 8.0 6.0 - 8.2 g/dL    Globulin 3.2 1.9 - 3.5 g/dL    A-G Ratio 1.5 g/dL   BETA-HCG QUALITATIVE SERUM    Collection Time: 11/07/17  2:32 AM   Result Value Ref Range    Beta-Hcg Qualitative Serum Negative Negative   ESTIMATED GFR    Collection Time: 11/07/17  2:32 AM   Result Value Ref Range    GFR If African American >60 >60 mL/min/1.73 m 2    GFR If Non African American >60 >60 mL/min/1.73 m 2   CHLAMYDIA/GC PCR URINE OR SWAB    Collection Time: 11/07/17  6:05 AM   Result Value Ref Range    Source Urine    URINALYSIS    Collection Time: 11/07/17  6:05 AM   Result Value Ref Range    Color DK Yellow     Character Cloudy (A)     Specific Gravity 1.032 <1.035    Ph 5.0 5.0 - 8.0    Glucose Negative Negative mg/dL    Ketones 15 (A) Negative mg/dL    Protein 100 (A) Negative mg/dL    Bilirubin Negative Negative    Urobilinogen, Urine 1.0 Negative    Nitrite Negative Negative    Leukocyte Esterase Trace (A) Negative    Occult Blood Small (A) Negative    Micro Urine Req Microscopic        Imaging/Procedures Review:    ndependant Imaging Review: Completed  [id number] VIEW   Final Result      No evidence of bowel obstruction.                  DX-CHEST-PORTABLE (1 VIEW)   Final Result      No acute cardiac or pulmonary abnormalities are identified.           (X) Records reviewed and summarized in current documentation              Assessment/Plan     * Intractable nausea and vomiting- (present on admission)   Assessment & Plan    She has had 4 episodes of vomiting per day with no blood in vomitus and has not been able to keep anything down for the past 2-3 days  It is associated with abdominal pain and occurs once every month with her period  Plan:  Last qtc was 536, new EKG ordered  Holding zofran and compazine, ordered promethazine  IV fluids  Pain control with ibuprofen and morphine PRN        Abdominal pain- (present on admission)   Assessment & Plan    Patient complains of cyclical abdominal pain during her period associated with nausea and vomiting for the past 2-3 days. It is 9-10/10 dull and achy abdominal pain with episodes of sharp increases in the pain. She takes ibuprofen for the pain, but that does not really help.  On exam nonspecific abdominal tenderness  Denies diarrhea and constipation.   Gives h/o blood in stool - blood covers stool and is on the toilet paper. Has severe pain associated with each defecation for past month or so  Abdomen x-ray - No abnormally distended loops of air-filled bowel bowel are evident to suggest bowel obstruction. There is no evidence of free intraperitoneal air. Cholecystectomy clips are present  Plan:  Pain control with ibuprofen and morphine PRN  Fobt ordered  Monitor         Leukocytosis- (present on admission)   Assessment & Plan    WBC of 15.6  On history and exam no real sign of infection  UA ordered- denies dysuria  Chlamydia/GC ordered  Chest x-ray - no acute findings            Anticipated Hospital stay: Observation admit        Quality Measures    Reviewed items::  Labs reviewed, Medications reviewed and Radiology images reviewed  Barrett catheter::  No Barrett  DVT prophylaxis pharmacological::  Enoxaparin (Lovenox)    The patient was seen by me face to face. I personally had a discussion with the patient. The case was discussed with our resident team, I examined the patient and  confirmed the essential components of the history, physical examination, diagnosis and treatment plan as needed. I agree with the patient care as documented by the resident and edited as above by me. See resident's note above for complete details of service. The overall treatment regimen will be carried out as described above.     Thank you:  Sridhar Oquendo MD, FACP  [facility] Internal Medicine  Pager: Use Tiger Text (use resident info under treatment team for day to day floor issues)  Office: [phone] Ext. [phone]  Fax: [phone] (non PHI only)

## 2017-11-07 NOTE — ASSESSMENT & PLAN NOTE
She has had 4 episodes of vomiting per day with no blood in vomitus and has not been able to keep anything down for the past 2-3 days  It is associated with abdominal pain and occurs once every month with her period  Plan:  Last qtc was 536, new EKG ordered  Holding zofran and compazine, ordered promethazine  IV fluids  Pain control with ibuprofen and morphine PRN

## 2017-11-07 NOTE — PROGRESS NOTES
Pt called for ride at this time. Pt IV removed. Pt medication, scripts, belongings, and paperwork gone over with Pt. Pt comments, questions, and concerns answered. Pt was able to get self ready to discharge. Transport to take Pt to ER for discharge. Pt discharged at this time.

## 2017-11-07 NOTE — ED NOTES
BIB EMS    Chief Complaint   Patient presents with   • Seizure     per EMS, pt came in for seizures, on EMS arrivel pt had 3 to 4 seizres lasting 30 seconds. pt A&O 4 right after.      PTA given by EMS 2 mg versed IM and 2 mg versed IV and 4 of Zofran.     Pt found outside at home.     Chart up for ERP

## 2017-11-07 NOTE — PROGRESS NOTES
Pt brought down to unit this Am after concern for seizure activity. Upon arrival to the unit, Pt ambulated into bathroom well on own. Pt A&Ox4, follows all commands at this time. Pt lethargic when placed back to bed. Pt PERRLA intact. Pt  and push pulls equal and strong to all limbs. Pt has no dysphagia and speech is clear.  Pt ad giovanna with stand by ast. Pt has no tele in place. Pulses are all palpable, no edema present at this time. Pt SCD's off, Lovenox on. Pt lungs are clear to all four lobes on Ra. Pt bowel sounds active to all four quads. Pt has not eaten at this time. Pt voiding on own in bathroom as needed.  Pt has no skin issues present at this time. Pt IV to right wrist hep locked, CDI. Call bell in reach. No new orders present at this time. Will follow up with POC as needed.

## 2017-11-07 NOTE — DISCHARGE INSTRUCTIONS
Discharge Instructions    Discharged to home by car with relative. Discharged via wheelchair, hospital escort: Yes.  Special equipment needed: Not Applicable    Be sure to schedule a follow-up appointment with your primary care doctor or any specialists as instructed.     Discharge Plan:        I understand that a diet low in cholesterol, fat, and sodium is recommended for good health. Unless I have been given specific instructions below for another diet, I accept this instruction as my diet prescription.   Other diet: Regular    Special Instructions: None    · Is patient discharged on Warfarin / Coumadin?   No     · Is patient Post Blood Transfusion?  No    Depression / Suicide Risk    As you are discharged from this formerly [facility] facility, it is important to learn how to keep safe from harming yourself.    Recognize the warning signs:  · Abrupt changes in personality, positive or negative- including increase in energy   · Giving away possessions  · Change in eating patterns- significant weight changes-  positive or negative  · Change in sleeping patterns- unable to sleep or sleeping all the time   · Unwillingness or inability to communicate  · Depression  · Unusual sadness, discouragement and loneliness  · Talk of wanting to die  · Neglect of personal appearance   · Rebelliousness- reckless behavior  · Withdrawal from people/activities they love  · Confusion- inability to concentrate     If you or a loved one observes any of these behaviors or has concerns about self-harm, here's what you can do:  · Talk about it- your feelings and reasons for harming yourself  · Remove any means that you might use to hurt yourself (examples: pills, rope, extension cords, firearm)  · Get professional help from the community (Mental Health, Substance Abuse, psychological counseling)  · Do not be alone:Call your Safe Contact- someone whom you trust who will be there for you.  · Call your local CRISIS HOTLINE 593-2804 or  637-249-7053  · Call your local Children's Mobile Crisis Response Team [place] (029) 091-7670 or www.PrepChamps.Tiinkk  · Call the toll free National Suicide Prevention Hotlines   · National Suicide Prevention Lifeline 478-145-DNSP (3496)  · National Cyber[employer] Line Network 800-SUICIDE (643-1500)

## 2017-11-07 NOTE — ED NOTES
"Pt vomiting, ERP aware, new orders received.     Per pt, she is shaking \"because it helps the pain.\"   "

## 2017-11-07 NOTE — ASSESSMENT & PLAN NOTE
Patient complains of cyclical abdominal pain during her period associated with nausea and vomiting for the past 2-3 days. It is 9-10/10 dull and achy abdominal pain with episodes of sharp increases in the pain. She takes ibuprofen for the pain, but that does not really help.  On exam nonspecific abdominal tenderness  Denies diarrhea and constipation.   Gives h/o blood in stool - blood covers stool and is on the toilet paper. Has severe pain associated with each defecation for past month or so  Abdomen x-ray - No abnormally distended loops of air-filled bowel bowel are evident to suggest bowel obstruction. There is no evidence of free intraperitoneal air. Cholecystectomy clips are present  Plan:  Pain control with ibuprofen and morphine PRN  Fobt ordered  Monitor

## 2017-11-07 NOTE — ED NOTES
Pt removing all monitoring equipment. Pt educated about the importance of monitors. Pt continues to refuse.

## 2017-11-07 NOTE — SENIOR ADMIT NOTE
"Ms. Yang is a 27 y.o. female with PMHx of multiple ER visits for vomiting and pseudoseizures presented with c/o vomiting.    Pt has come to ER multiple times for above stated symptoms. She started shaking vigorously multiple times when we were at the bedside. She tends not to responds during the the shaking, they last for a minute and start again. Nurse said that she stops shaking when she has to go to restroom. Has no SOB, post shaking confusion and she is aware of things going on around her during shaking.  She said that she is vomiting since last 2-3 days, 4-5 x/day, mostly bilious, non-bloody. It  is associated with dysmenorrhea and she is currently having periods. States that ibuprofen does not help reduce the pain.  No fever, chills, denies illicit drugs.     Per discharge summary in 4/2017 she was diagnosed with pseudoseizures in 2015 through video EEG. In 4/2017 neurology was consulted and she got short dose of keppra but no EEG. Abdominal pain was diagnosed as abdominal migraines/ endometriosis.    Exam:  /80   Pulse (!) 110   Temp 35.9 °C (96.6 °F)   Resp 20   Ht 1.626 m (5' 4\")   Wt 54.4 kg (120 lb)   BMI 20.60 kg/m²     Physical exam:   General: comfortable,not in acute distress  HEENT: NC/AT. Dilated pupils. Dry mucous membranes. No lymphadenopathy.  CVS: tachycardia, S1S2 WNL, no MRG  RS: CTA, good air entry. No wheezes or ronchi.  Abdomen: Soft, diffuse tenderness, BS +. No organomegaly  Ext: No pedal edema  Genital exam: Per PV, no foul smelling discharge or bleeding  Neuro: CN 2-12 wnl. Motor and sensory exam wnl. She keeps saking her whole body as stated in HPI      Labs:  Recent Labs      11/07/17 0232   SODIUM  140   POTASSIUM  4.0   CHLORIDE  109   CO2  22   BUN  20   CREATININE  0.63   CALCIUM  10.2       Recent Labs      11/07/17 0232   ALTSGPT  12   ASTSGOT  14   ALKPHOSPHAT  90   TBILIRUBIN  0.5   GLUCOSE  159*       Recent Labs      11/07/17 0232   RBC  4.61   HEMOGLOBIN  " 15.1   HEMATOCRIT  43.7   PLATELETCT  240       Recent Labs      11/07/17   0232   WBC  15.6*   NEUTSPOLYS  83.40*   LYMPHOCYTES  11.80*   MONOCYTES  4.00   EOSINOPHILS  0.10   BASOPHILS  0.30   ASTSGOT  14   ALTSGPT  12   ALKPHOSPHAT  90   TBILIRUBIN  0.5         [id number] VIEW   Final Result      No evidence of bowel obstruction.                  DX-CHEST-PORTABLE (1 VIEW)   Final Result      No acute cardiac or pulmonary abnormalities are identified.          Assessment and Plan:  # Cyclical vomiting  - multiple ER visits due to similar complaints. Cause is unknown at this time as she denies taking drugs. There is likely psychiatric component.  - Mild dehydration per examination however labs are stable. Abdominal and CXR wnl.  Plan   - symptomatic management with IVF, PRN antiemetics  - Ordered Utox    # Abdominal pain  - was diagnosed as abdominal migraines/ endometriosis. Currently has periods  - PRN pain meds    # h/o  Pseudoseizures  - she was diagnosed with pseudoseizures in 2015 through video EEG. In 4/2017 neurology was consulted and she got short dose of keppra but no EEG.    # Leucocytosis   - it is likely due to pseudoseizures. SIRS 2/4 however less likely sepsis. No fever. CTM.    For further details , please refer to H&P by Dr. De Jesus

## 2017-11-09 ENCOUNTER — HOSPITAL ENCOUNTER (INPATIENT)
Facility: MEDICAL CENTER | Age: 27
LOS: 1 days | DRG: 101 | End: 2017-11-09
Attending: EMERGENCY MEDICINE | Admitting: INTERNAL MEDICINE
Payer: MEDICAID

## 2017-11-09 ENCOUNTER — APPOINTMENT (OUTPATIENT)
Dept: RADIOLOGY | Facility: MEDICAL CENTER | Age: 27
DRG: 101 | End: 2017-11-09
Attending: EMERGENCY MEDICINE
Payer: MEDICAID

## 2017-11-09 VITALS
DIASTOLIC BLOOD PRESSURE: 80 MMHG | HEIGHT: 64 IN | BODY MASS INDEX: 21.34 KG/M2 | OXYGEN SATURATION: 99 % | SYSTOLIC BLOOD PRESSURE: 113 MMHG | RESPIRATION RATE: 35 BRPM | TEMPERATURE: 99.1 F | HEART RATE: 78 BPM | WEIGHT: 125 LBS

## 2017-11-09 DIAGNOSIS — E87.6 HYPOKALEMIA: ICD-10-CM

## 2017-11-09 DIAGNOSIS — F19.10 POLYSUBSTANCE ABUSE (HCC): ICD-10-CM

## 2017-11-09 DIAGNOSIS — I47.20 VENTRICULAR TACHYCARDIA (HCC): ICD-10-CM

## 2017-11-09 DIAGNOSIS — R56.9 SEIZURE (HCC): Primary | ICD-10-CM

## 2017-11-09 PROBLEM — I49.9 ABNORMAL HEART RHYTHM: Status: ACTIVE | Noted: 2017-11-09

## 2017-11-09 PROBLEM — E87.1 HYPONATREMIA: Status: ACTIVE | Noted: 2017-11-09

## 2017-11-09 LAB
ALBUMIN SERPL BCP-MCNC: 4.8 G/DL (ref 3.2–4.9)
ALBUMIN/GLOB SERPL: 1.5 G/DL
ALP SERPL-CCNC: 91 U/L (ref 30–99)
ALT SERPL-CCNC: 13 U/L (ref 2–50)
AMORPH CRY #/AREA URNS HPF: PRESENT /HPF
AMPHET UR QL SCN: NEGATIVE
ANION GAP SERPL CALC-SCNC: 14 MMOL/L (ref 0–11.9)
APPEARANCE UR: ABNORMAL
AST SERPL-CCNC: 17 U/L (ref 12–45)
BARBITURATES UR QL SCN: NEGATIVE
BASOPHILS # BLD AUTO: 0.2 % (ref 0–1.8)
BASOPHILS # BLD: 0.03 K/UL (ref 0–0.12)
BENZODIAZ UR QL SCN: NEGATIVE
BILIRUB SERPL-MCNC: 1 MG/DL (ref 0.1–1.5)
BILIRUB UR QL STRIP.AUTO: NEGATIVE
BUN SERPL-MCNC: 26 MG/DL (ref 8–22)
BZE UR QL SCN: NEGATIVE
CALCIUM SERPL-MCNC: 9.5 MG/DL (ref 8.5–10.5)
CANNABINOIDS UR QL SCN: POSITIVE
CHLORIDE SERPL-SCNC: 96 MMOL/L (ref 96–112)
CK SERPL-CCNC: 77 U/L (ref 0–154)
CO2 SERPL-SCNC: 23 MMOL/L (ref 20–33)
COLOR UR: ABNORMAL
CREAT SERPL-MCNC: 0.66 MG/DL (ref 0.5–1.4)
EKG IMPRESSION: NORMAL
EOSINOPHIL # BLD AUTO: 0.01 K/UL (ref 0–0.51)
EOSINOPHIL NFR BLD: 0.1 % (ref 0–6.9)
ERYTHROCYTE [DISTWIDTH] IN BLOOD BY AUTOMATED COUNT: 42.5 FL (ref 35.9–50)
GFR SERPL CREATININE-BSD FRML MDRD: >60 ML/MIN/1.73 M 2
GLOBULIN SER CALC-MCNC: 3.1 G/DL (ref 1.9–3.5)
GLUCOSE SERPL-MCNC: 140 MG/DL (ref 65–99)
GLUCOSE UR STRIP.AUTO-MCNC: NEGATIVE MG/DL
HCG SERPL QL: NEGATIVE
HCT VFR BLD AUTO: 44.1 % (ref 37–47)
HGB BLD-MCNC: 16 G/DL (ref 12–16)
IMM GRANULOCYTES # BLD AUTO: 0.09 K/UL (ref 0–0.11)
IMM GRANULOCYTES NFR BLD AUTO: 0.5 % (ref 0–0.9)
KETONES UR STRIP.AUTO-MCNC: ABNORMAL MG/DL
LACTATE BLD-SCNC: 0.9 MMOL/L (ref 0.5–2)
LEUKOCYTE ESTERASE UR QL STRIP.AUTO: ABNORMAL
LV EJECT FRACT  99904: 60
LV EJECT FRACT MOD 2C 99903: 63.44
LV EJECT FRACT MOD 4C 99902: 56.77
LV EJECT FRACT MOD BP 99901: 60.96
LYMPHOCYTES # BLD AUTO: 1.23 K/UL (ref 1–4.8)
LYMPHOCYTES NFR BLD: 6.6 % (ref 22–41)
MAGNESIUM SERPL-MCNC: 1.8 MG/DL (ref 1.5–2.5)
MAGNESIUM SERPL-MCNC: 2.3 MG/DL (ref 1.5–2.5)
MCH RBC QN AUTO: 33.5 PG (ref 27–33)
MCHC RBC AUTO-ENTMCNC: 36.3 G/DL (ref 33.6–35)
MCV RBC AUTO: 92.5 FL (ref 81.4–97.8)
METHADONE UR QL SCN: NEGATIVE
MICRO URNS: ABNORMAL
MONOCYTES # BLD AUTO: 1.15 K/UL (ref 0–0.85)
MONOCYTES NFR BLD AUTO: 6.2 % (ref 0–13.4)
MUCOUS THREADS #/AREA URNS HPF: NORMAL /HPF
NEUTROPHILS # BLD AUTO: 16.01 K/UL (ref 2–7.15)
NEUTROPHILS NFR BLD: 86.4 % (ref 44–72)
NITRITE UR QL STRIP.AUTO: NEGATIVE
NRBC # BLD AUTO: 0 K/UL
NRBC BLD AUTO-RTO: 0 /100 WBC
OPIATES UR QL SCN: POSITIVE
OXYCODONE UR QL SCN: NEGATIVE
PCP UR QL SCN: NEGATIVE
PH UR STRIP.AUTO: 6 [PH]
PLATELET # BLD AUTO: 233 K/UL (ref 164–446)
PMV BLD AUTO: 10.7 FL (ref 9–12.9)
POTASSIUM SERPL-SCNC: 3.3 MMOL/L (ref 3.6–5.5)
PROCALCITONIN SERPL-MCNC: 0.06 NG/ML
PROLACTIN SERPL-MCNC: 25.58 NG/ML (ref 2.8–26)
PROPOXYPH UR QL SCN: NEGATIVE
PROT SERPL-MCNC: 7.9 G/DL (ref 6–8.2)
PROT UR QL STRIP: 100 MG/DL
RBC # BLD AUTO: 4.77 M/UL (ref 4.2–5.4)
RBC UR QL AUTO: NEGATIVE
SODIUM SERPL-SCNC: 133 MMOL/L (ref 135–145)
SP GR UR STRIP.AUTO: 1.03
UROBILINOGEN UR STRIP.AUTO-MCNC: NORMAL MG/DL
WBC # BLD AUTO: 18.5 K/UL (ref 4.8–10.8)
WBC #/AREA URNS HPF: NORMAL /HPF

## 2017-11-09 PROCEDURE — G8996 SWALLOW CURRENT STATUS: HCPCS | Mod: CL

## 2017-11-09 PROCEDURE — 80307 DRUG TEST PRSMV CHEM ANLYZR: CPT

## 2017-11-09 PROCEDURE — 83605 ASSAY OF LACTIC ACID: CPT

## 2017-11-09 PROCEDURE — 87086 URINE CULTURE/COLONY COUNT: CPT

## 2017-11-09 PROCEDURE — 700111 HCHG RX REV CODE 636 W/ 250 OVERRIDE (IP)

## 2017-11-09 PROCEDURE — 770022 HCHG ROOM/CARE - ICU (200)

## 2017-11-09 PROCEDURE — 93306 TTE W/DOPPLER COMPLETE: CPT | Mod: 26 | Performed by: INTERNAL MEDICINE

## 2017-11-09 PROCEDURE — 36415 COLL VENOUS BLD VENIPUNCTURE: CPT

## 2017-11-09 PROCEDURE — 304561 HCHG STAT O2

## 2017-11-09 PROCEDURE — 96365 THER/PROPH/DIAG IV INF INIT: CPT

## 2017-11-09 PROCEDURE — 94760 N-INVAS EAR/PLS OXIMETRY 1: CPT

## 2017-11-09 PROCEDURE — 80053 COMPREHEN METABOLIC PANEL: CPT

## 2017-11-09 PROCEDURE — 700105 HCHG RX REV CODE 258: Performed by: INTERNAL MEDICINE

## 2017-11-09 PROCEDURE — 96376 TX/PRO/DX INJ SAME DRUG ADON: CPT

## 2017-11-09 PROCEDURE — 96375 TX/PRO/DX INJ NEW DRUG ADDON: CPT

## 2017-11-09 PROCEDURE — 96367 TX/PROPH/DG ADDL SEQ IV INF: CPT

## 2017-11-09 PROCEDURE — 70450 CT HEAD/BRAIN W/O DYE: CPT

## 2017-11-09 PROCEDURE — 99291 CRITICAL CARE FIRST HOUR: CPT

## 2017-11-09 PROCEDURE — 700111 HCHG RX REV CODE 636 W/ 250 OVERRIDE (IP): Performed by: EMERGENCY MEDICINE

## 2017-11-09 PROCEDURE — G8997 SWALLOW GOAL STATUS: HCPCS | Mod: CH

## 2017-11-09 PROCEDURE — 700102 HCHG RX REV CODE 250 W/ 637 OVERRIDE(OP): Performed by: INTERNAL MEDICINE

## 2017-11-09 PROCEDURE — 84145 PROCALCITONIN (PCT): CPT

## 2017-11-09 PROCEDURE — 700111 HCHG RX REV CODE 636 W/ 250 OVERRIDE (IP): Performed by: INTERNAL MEDICINE

## 2017-11-09 PROCEDURE — 85025 COMPLETE CBC W/AUTO DIFF WBC: CPT

## 2017-11-09 PROCEDURE — 81001 URINALYSIS AUTO W/SCOPE: CPT

## 2017-11-09 PROCEDURE — 700105 HCHG RX REV CODE 258: Performed by: PHARMACIST

## 2017-11-09 PROCEDURE — 84703 CHORIONIC GONADOTROPIN ASSAY: CPT

## 2017-11-09 PROCEDURE — 82550 ASSAY OF CK (CPK): CPT

## 2017-11-09 PROCEDURE — 83735 ASSAY OF MAGNESIUM: CPT

## 2017-11-09 PROCEDURE — 700111 HCHG RX REV CODE 636 W/ 250 OVERRIDE (IP): Performed by: PHARMACIST

## 2017-11-09 PROCEDURE — A9270 NON-COVERED ITEM OR SERVICE: HCPCS | Performed by: INTERNAL MEDICINE

## 2017-11-09 PROCEDURE — 92610 EVALUATE SWALLOWING FUNCTION: CPT

## 2017-11-09 PROCEDURE — 93306 TTE W/DOPPLER COMPLETE: CPT

## 2017-11-09 PROCEDURE — 700105 HCHG RX REV CODE 258: Performed by: EMERGENCY MEDICINE

## 2017-11-09 PROCEDURE — 93005 ELECTROCARDIOGRAM TRACING: CPT | Performed by: EMERGENCY MEDICINE

## 2017-11-09 PROCEDURE — 84146 ASSAY OF PROLACTIN: CPT

## 2017-11-09 RX ORDER — LORAZEPAM 2 MG/ML
2 INJECTION INTRAMUSCULAR ONCE
Status: COMPLETED | OUTPATIENT
Start: 2017-11-09 | End: 2017-11-09

## 2017-11-09 RX ORDER — POTASSIUM CHLORIDE 7.45 MG/ML
10 INJECTION INTRAVENOUS
Status: DISCONTINUED | OUTPATIENT
Start: 2017-11-09 | End: 2017-11-09 | Stop reason: HOSPADM

## 2017-11-09 RX ORDER — DEXTROSE MONOHYDRATE 50 MG/ML
500 INJECTION, SOLUTION INTRAVENOUS CONTINUOUS
Status: DISCONTINUED | OUTPATIENT
Start: 2017-11-09 | End: 2017-11-09

## 2017-11-09 RX ORDER — BISACODYL 10 MG
10 SUPPOSITORY, RECTAL RECTAL
Status: DISCONTINUED | OUTPATIENT
Start: 2017-11-09 | End: 2017-11-09 | Stop reason: HOSPADM

## 2017-11-09 RX ORDER — HEPARIN SODIUM 5000 [USP'U]/ML
5000 INJECTION, SOLUTION INTRAVENOUS; SUBCUTANEOUS EVERY 8 HOURS
Status: DISCONTINUED | OUTPATIENT
Start: 2017-11-09 | End: 2017-11-09 | Stop reason: HOSPADM

## 2017-11-09 RX ORDER — POLYETHYLENE GLYCOL 3350 17 G/17G
1 POWDER, FOR SOLUTION ORAL
Status: DISCONTINUED | OUTPATIENT
Start: 2017-11-09 | End: 2017-11-09 | Stop reason: HOSPADM

## 2017-11-09 RX ORDER — ACETAMINOPHEN 325 MG/1
650 TABLET ORAL EVERY 6 HOURS PRN
Status: DISCONTINUED | OUTPATIENT
Start: 2017-11-09 | End: 2017-11-09 | Stop reason: HOSPADM

## 2017-11-09 RX ORDER — AMOXICILLIN 250 MG
2 CAPSULE ORAL 2 TIMES DAILY
Status: DISCONTINUED | OUTPATIENT
Start: 2017-11-09 | End: 2017-11-09 | Stop reason: HOSPADM

## 2017-11-09 RX ORDER — POTASSIUM CHLORIDE 7.45 MG/ML
10 INJECTION INTRAVENOUS
Status: COMPLETED | OUTPATIENT
Start: 2017-11-09 | End: 2017-11-09

## 2017-11-09 RX ORDER — LORAZEPAM 2 MG/ML
2 INJECTION INTRAMUSCULAR ONCE
Status: DISCONTINUED | OUTPATIENT
Start: 2017-11-09 | End: 2017-11-09 | Stop reason: HOSPADM

## 2017-11-09 RX ORDER — OMEPRAZOLE 20 MG/1
20 CAPSULE, DELAYED RELEASE ORAL DAILY
Status: DISCONTINUED | OUTPATIENT
Start: 2017-11-09 | End: 2017-11-09 | Stop reason: HOSPADM

## 2017-11-09 RX ORDER — LORAZEPAM 2 MG/ML
INJECTION INTRAMUSCULAR
Status: COMPLETED
Start: 2017-11-09 | End: 2017-11-09

## 2017-11-09 RX ORDER — MAGNESIUM SULFATE HEPTAHYDRATE 40 MG/ML
2 INJECTION, SOLUTION INTRAVENOUS ONCE
Status: COMPLETED | OUTPATIENT
Start: 2017-11-09 | End: 2017-11-09

## 2017-11-09 RX ORDER — SUCRALFATE 1 G/1
1 TABLET ORAL
Status: DISCONTINUED | OUTPATIENT
Start: 2017-11-09 | End: 2017-11-09 | Stop reason: HOSPADM

## 2017-11-09 RX ORDER — HYDROCODONE BITARTRATE AND ACETAMINOPHEN 5; 325 MG/1; MG/1
1-2 TABLET ORAL EVERY 4 HOURS PRN
Status: DISCONTINUED | OUTPATIENT
Start: 2017-11-09 | End: 2017-11-09 | Stop reason: HOSPADM

## 2017-11-09 RX ORDER — SODIUM CHLORIDE 9 MG/ML
1000 INJECTION, SOLUTION INTRAVENOUS ONCE
Status: COMPLETED | OUTPATIENT
Start: 2017-11-09 | End: 2017-11-09

## 2017-11-09 RX ORDER — LORAZEPAM 2 MG/ML
2 INJECTION INTRAMUSCULAR EVERY 4 HOURS PRN
Status: DISCONTINUED | OUTPATIENT
Start: 2017-11-09 | End: 2017-11-09 | Stop reason: HOSPADM

## 2017-11-09 RX ORDER — AMIODARONE HYDROCHLORIDE 150 MG/3ML
INJECTION, SOLUTION INTRAVENOUS
Status: COMPLETED
Start: 2017-11-09 | End: 2017-11-09

## 2017-11-09 RX ORDER — SODIUM CHLORIDE 9 MG/ML
INJECTION, SOLUTION INTRAVENOUS CONTINUOUS
Status: DISCONTINUED | OUTPATIENT
Start: 2017-11-09 | End: 2017-11-09 | Stop reason: HOSPADM

## 2017-11-09 RX ADMIN — LORAZEPAM 2 MG: 2 INJECTION INTRAMUSCULAR; INTRAVENOUS at 06:06

## 2017-11-09 RX ADMIN — AMIODARONE HYDROCHLORIDE 150 MG: 50 INJECTION, SOLUTION INTRAVENOUS at 03:59

## 2017-11-09 RX ADMIN — MAGNESIUM SULFATE IN WATER 2 G: 40 INJECTION, SOLUTION INTRAVENOUS at 04:02

## 2017-11-09 RX ADMIN — SODIUM CHLORIDE: 9 INJECTION, SOLUTION INTRAVENOUS at 06:12

## 2017-11-09 RX ADMIN — LORAZEPAM 2 MG: 2 INJECTION INTRAMUSCULAR; INTRAVENOUS at 03:30

## 2017-11-09 RX ADMIN — SUCRALFATE 1 G: 1 TABLET ORAL at 08:40

## 2017-11-09 RX ADMIN — LORAZEPAM 2 MG: 2 INJECTION INTRAMUSCULAR; INTRAVENOUS at 09:41

## 2017-11-09 RX ADMIN — POTASSIUM CHLORIDE 10 MEQ: 10 INJECTION, SOLUTION INTRAVENOUS at 05:18

## 2017-11-09 RX ADMIN — LORAZEPAM: 2 INJECTION INTRAMUSCULAR at 03:57

## 2017-11-09 RX ADMIN — POTASSIUM CHLORIDE 10 MEQ: 10 INJECTION, SOLUTION INTRAVENOUS at 15:15

## 2017-11-09 RX ADMIN — DEXTROSE MONOHYDRATE 500 ML: 50 INJECTION, SOLUTION INTRAVENOUS at 06:08

## 2017-11-09 RX ADMIN — SODIUM CHLORIDE 1000 ML: 9 INJECTION, SOLUTION INTRAVENOUS at 04:00

## 2017-11-09 RX ADMIN — POTASSIUM CHLORIDE 10 MEQ: 10 INJECTION, SOLUTION INTRAVENOUS at 06:22

## 2017-11-09 RX ADMIN — HEPARIN SODIUM 5000 UNITS: 5000 INJECTION, SOLUTION INTRAVENOUS; SUBCUTANEOUS at 06:27

## 2017-11-09 RX ADMIN — AMIODARONE HYDROCHLORIDE 0.99 MG/MIN: 50 INJECTION, SOLUTION INTRAVENOUS at 05:47

## 2017-11-09 RX ADMIN — POTASSIUM CHLORIDE 10 MEQ: 10 INJECTION, SOLUTION INTRAVENOUS at 07:36

## 2017-11-09 RX ADMIN — LORAZEPAM: 2 INJECTION INTRAMUSCULAR; INTRAVENOUS at 03:57

## 2017-11-09 RX ADMIN — HEPARIN SODIUM 5000 UNITS: 5000 INJECTION, SOLUTION INTRAVENOUS; SUBCUTANEOUS at 15:16

## 2017-11-09 RX ADMIN — OMEPRAZOLE 20 MG: 20 CAPSULE, DELAYED RELEASE ORAL at 08:40

## 2017-11-09 RX ADMIN — POTASSIUM CHLORIDE 10 MEQ: 10 INJECTION, SOLUTION INTRAVENOUS at 08:39

## 2017-11-09 ASSESSMENT — ENCOUNTER SYMPTOMS
SEIZURES: 1
TINGLING: 0
CHILLS: 0
ORTHOPNEA: 0
SENSORY CHANGE: 0
PALPITATIONS: 0
SHORTNESS OF BREATH: 0
FOCAL WEAKNESS: 0
SHORTNESS OF BREATH: 1
LOSS OF CONSCIOUSNESS: 0
DIARRHEA: 0
ABDOMINAL PAIN: 1
BLURRED VISION: 0
SPEECH CHANGE: 0
FALLS: 1
PND: 0
LOSS OF CONSCIOUSNESS: 1
DOUBLE VISION: 0
MYALGIAS: 1
BACK PAIN: 1
NAUSEA: 0
ABDOMINAL PAIN: 0
DIZZINESS: 0
SORE THROAT: 0
WEIGHT LOSS: 0
CONSTIPATION: 0
BLOOD IN STOOL: 0
HEADACHES: 0
VOMITING: 0
COUGH: 0
FEVER: 0

## 2017-11-09 ASSESSMENT — COPD QUESTIONNAIRES
DO YOU EVER COUGH UP ANY MUCUS OR PHLEGM?: NO/ONLY WITH OCCASIONAL COLDS OR INFECTIONS
HAVE YOU SMOKED AT LEAST 100 CIGARETTES IN YOUR ENTIRE LIFE: NO/DON'T KNOW
COPD SCREENING SCORE: 0
DURING THE PAST 4 WEEKS HOW MUCH DID YOU FEEL SHORT OF BREATH: NONE/LITTLE OF THE TIME

## 2017-11-09 ASSESSMENT — PAIN SCALES - GENERAL
PAINLEVEL_OUTOF10: 0
PAINLEVEL_OUTOF10: 0

## 2017-11-09 ASSESSMENT — LIFESTYLE VARIABLES: EVER_SMOKED: YES

## 2017-11-09 NOTE — ED NOTES
Pt began seizing again at 0356, ERP at bedside. Pt also in VT, x1 shock given, pt returned to NSR. 150mg ativan given as well as 2 mg magnesium.

## 2017-11-09 NOTE — PROGRESS NOTES
"Tech attempted to perform EEG. Pt insisted that she has had this test before and that it wouldn't show anything because she \"has pseudoseizures\".  Dr. Agudelo spoke with her as well and she did not want to do the EEG.  "

## 2017-11-09 NOTE — PROGRESS NOTES
Pt arrived to [facility] from ER at 0602 with ACLS RN and CCT. Temp 99.7 pulse 95 BP 98/59 Wt 56.7 kg.  Pt transferred to ICU bed without incident.  Five minutes later Pt began to seize for Approx one minute.  2 mg of Ativan administered.

## 2017-11-09 NOTE — PROGRESS NOTES
Internal Medicine Interval Note  Note Author: Ena Morrison M.D.     Name Rupa Yang     1990   Age/Sex 27 y.o. female   MRN [id number]   Code Status Full code      After 5PM or if no immediate response to page, please call for cross-coverage  Attending/Team: Dr Agudelo  See Patient List for primary contact information  Call [phone] to page    1st Call - Day Intern (R1):   Giovanny  2nd Call - Day Sr. Resident (R2/R3):   Elvia          Reason for interval visit  (Principal Problem)   Seizure (CMS-[facility])    Interval Problem Daily Status Update  (24 hours)   Patient admitted overnight due to seizure and one apparent episode of VTach while having an episode of seizure. Status post cardioversion. Patient started on amiodarone drip in the ED and admitted to the ICU.   Head CT: no acute intracranial abnormalities.   Cardiology evaluated the patient and discontinue amiodarone drip. ECHO pending   Neurology on the case - EEG pending. Will not start seizure medications until EEG completed.       Review of Systems   Constitutional: Negative for chills and fever.   HENT: Negative for congestion.    Eyes: Negative for blurred vision and double vision.   Respiratory: Negative for cough and shortness of breath.    Cardiovascular: Negative for chest pain, palpitations and leg swelling.   Gastrointestinal: Negative for abdominal pain, nausea and vomiting.   Musculoskeletal: Positive for myalgias.   Skin: Negative for rash.   Neurological: Positive for seizures. Negative for dizziness, focal weakness, loss of consciousness and headaches.       Consultants/Specialty  Cardiology   Neurology   PMN     Disposition      Quality Measures    Reviewed items::  Radiology images reviewed, EKG reviewed, Labs reviewed and Medications reviewed  Barrett catheter::  No Barrett  DVT prophylaxis pharmacological::  Heparin          Physical Exam       Vitals:    17 0602 17 0700 17 0800 17  0900   BP:       Pulse:  (!) 101 93 89   Resp:  (!) 30 (!) 36 (!) 33   Temp: 37.6 °C (99.7 °F)  37.5 °C (99.5 °F)    SpO2:  (!) 62% 96% 99%   Weight: 56.7 kg (125 lb)      Height:         Body mass index is 21.46 kg/m². Weight: 56.7 kg (125 lb)  Oxygen Therapy:  Pulse Oximetry: 99 %    Physical Exam   Constitutional: She is oriented to person, place, and time and well-developed, well-nourished, and in no distress.   HENT:   Head: Normocephalic and atraumatic.   Eyes: Conjunctivae and EOM are normal. Pupils are equal, round, and reactive to light.   Neck: Normal range of motion. Neck supple.   Cardiovascular: Normal rate, regular rhythm and normal heart sounds.    No murmur heard.  Pulmonary/Chest: Effort normal and breath sounds normal. No respiratory distress. She has no wheezes.   Abdominal: Soft. Bowel sounds are normal. She exhibits no distension. There is no tenderness.   Musculoskeletal: Normal range of motion. She exhibits no edema.   Neurological: She is alert and oriented to person, place, and time. She has normal strength, normal reflexes and intact cranial nerves. She displays no weakness and facial symmetry. No cranial nerve deficit.   Skin: Skin is warm.   Nursing note and vitals reviewed.        Lab Data Review:         11/9/2017  10:23 AM    Recent Labs      11/07/17 0232 11/09/17 0337 11/09/17 0723   SODIUM  140  133*   --    POTASSIUM  4.0  3.3*   --    CHLORIDE  109  96   --    CO2  22  23   --    BUN  20  26*   --    CREATININE  0.63  0.66   --    MAGNESIUM   --   1.8  2.3   CALCIUM  10.2  9.5   --        Recent Labs      11/07/17 0232 11/09/17 0337   ALTSGPT  12  13   ASTSGOT  14  17   ALKPHOSPHAT  90  91   TBILIRUBIN  0.5  1.0   GLUCOSE  159*  140*       Recent Labs      11/07/17 0232 11/09/17 0337   RBC  4.61  4.77   HEMOGLOBIN  15.1  16.0   HEMATOCRIT  43.7  44.1   PLATELETCT  240  233       Recent Labs      11/07/17 0232 11/09/17 0337   WBC  15.6*  18.5*   NEUTSPOLYS   83.40*  86.40*   LYMPHOCYTES  11.80*  6.60*   MONOCYTES  4.00  6.20   EOSINOPHILS  0.10  0.10   BASOPHILS  0.30  0.20   ASTSGOT  14  17   ALTSGPT  12  13   ALKPHOSPHAT  90  91   TBILIRUBIN  0.5  1.0           Assessment/Plan     * Seizure (CMS-[facility])   Assessment & Plan    - 2 Episodes seizure in the ED,  Lasting less than one min with no confusion after the episode   - polysubstance abuse   - PRN 2 mg Ativan for breakthrough seizures  -  No Hypocalcemia, hypoglycemia or acid base disturbance that could cause seizure.  - Head CT: no acute abnormalities.   - Continue Seizure precaution, fall and aspiration precaution.  - neurology consult: will not start seizure medication for now. Waiting on EEG   - continuous video EEG pending           Abdominal pain   Assessment & Plan    continue PPI and sucralfate.         Hyponatremia   Assessment & Plan    On IV fluid         Abnormal heart rhythm   Assessment & Plan    # VTACH ON TELE during the seizure episode    - On the cardiac monitor device, no ECG to confirm, questionable movement artifact, looks Vtcah on monitor   - Status post cardioversion  - received 2 gram mag and correction of potassium with 40 meq of potassium by ER physician   - Cardiology evaluated the patient. Discontinue amiodarone drip per their recommendations.   - ECHO pending - for further evaluation of LV function.               Hypokalemia- (present on admission)   Assessment & Plan    - Replete as needed

## 2017-11-09 NOTE — ED PROVIDER NOTES
"ED Provider Note    CHIEF COMPLAINT  Chief Complaint   Patient presents with   • Seizure       HPI  Rupa Yang is a 27 y.o. female who presentsTo the emergency Department through triage. Patient arrived in triage and fell to the floor with seizure activity. Brought by gurney to room 12 with active seizure.    HPI is limited due to patient's altered mental status and seizure activity.    REVIEW OF SYSTEMS  See HPI for further details. Limited due to patient's altered mental status and seizure activity.    PAST MEDICAL HISTORY   has a past medical history of Arrhythmia; Dental disorder; Eczema; Gastroenteritis; Heart burn; Indigestion; Other specified disorder of intestines; Pain (02-24-15); Scoliosis; and Substance abuse.    SOCIAL HISTORY  Social History     Social History Main Topics   • Smoking status: Current Every Day Smoker     Packs/day: 0.50     Types: Cigarettes   • Smokeless tobacco: Never Used   • Alcohol use No   • Drug use: Unknown      Comment: in past meth, cocaine, weed, states last used \"a long time ago\"   • Sexual activity: Yes     Partners: Male      Comment: planned pregnancy FOB involved       SURGICAL HISTORY   has a past surgical history that includes primary c section (11/27/2013) and earl by laparoscopy (3/3/2015).    CURRENT MEDICATIONS  Home Medications    **Home medications have not yet been reviewed for this encounter**         ALLERGIES  Allergies   Allergen Reactions   • Divalproex Sodium Hives and Rash         • Risperidone Hives and Rash         • Sertraline Hcl [Zoloft] Hives and Rash         • Tegretol [Carbamazepine] Hives and Rash             PHYSICAL EXAM  VITAL SIGNS: /80   Pulse 93   Resp 13   Ht 1.626 m (5' 4\")   Wt 54.4 kg (120 lb)   LMP 11/09/2017   SpO2 94%   BMI 20.60 kg/m²   Pulse ox interpretation: I interpret this pulse ox as normal.  Constitutional:Active seizure. Otherwise unresponsive.  HENT: Normocephalic, atraumatic. Bilateral external ears " normal, Nose normal. Moist mucous membranes.  No oral trauma.  Eyes: Pupils are equal and reactive, Conjunctiva normal.   Neck: Normal range of motion, Supple.  Lymphatic: No lymphadenopathy noted.   Cardiovascular: Tachycardic otherwise regular rate and rhythm, no murmurs. Distal pulses intact.  No peripheral edema.  Thorax & Lungs: Normal breath sounds.  No wheezing/rales/ronchi. No increased work of breathing.  Abdomen: Soft, non-distended.   Skin: Warm, Dry, No erythema, No rash.   Musculoskeletal: Good range of motion in all major joints. No major deformities noted.   Neurologic: Active seizure. On reevaluation, somnolent, confused but grossly nonfocal.  Psychiatric: Active seizure. On reevaluation somnolent, confused. Flat affect. Cooperative.      DIAGNOSTIC STUDIES / PROCEDURES    EKG  I interpreted this EKG myself.  This is a 12-lead study.  The rhythm is sinus with a rate of 88.  There are no ST segment nor T wave abnormalities. Normal intervals. Interpretation: No ST segment elevation myocardial infarction. No ectopy. No arrhythmia.  No change from previous EKG 7/2017.    LABS  Results for orders placed or performed during the hospital encounter of 11/09/17   CBC WITH DIFFERENTIAL   Result Value Ref Range    WBC 18.5 (H) 4.8 - 10.8 K/uL    RBC 4.77 4.20 - 5.40 M/uL    Hemoglobin 16.0 12.0 - 16.0 g/dL    Hematocrit 44.1 37.0 - 47.0 %    MCV 92.5 81.4 - 97.8 fL    MCH 33.5 (H) 27.0 - 33.0 pg    MCHC 36.3 (H) 33.6 - 35.0 g/dL    RDW 42.5 35.9 - 50.0 fL    Platelet Count 233 164 - 446 K/uL    MPV 10.7 9.0 - 12.9 fL    Neutrophils-Polys 86.40 (H) 44.00 - 72.00 %    Lymphocytes 6.60 (L) 22.00 - 41.00 %    Monocytes 6.20 0.00 - 13.40 %    Eosinophils 0.10 0.00 - 6.90 %    Basophils 0.20 0.00 - 1.80 %    Immature Granulocytes 0.50 0.00 - 0.90 %    Nucleated RBC 0.00 /100 WBC    Neutrophils (Absolute) 16.01 (H) 2.00 - 7.15 K/uL    Lymphs (Absolute) 1.23 1.00 - 4.80 K/uL    Monos (Absolute) 1.15 (H) 0.00 - 0.85  K/uL    Eos (Absolute) 0.01 0.00 - 0.51 K/uL    Baso (Absolute) 0.03 0.00 - 0.12 K/uL    Immature Granulocytes (abs) 0.09 0.00 - 0.11 K/uL    NRBC (Absolute) 0.00 K/uL   COMP METABOLIC PANEL   Result Value Ref Range    Sodium 133 (L) 135 - 145 mmol/L    Potassium 3.3 (L) 3.6 - 5.5 mmol/L    Chloride 96 96 - 112 mmol/L    Co2 23 20 - 33 mmol/L    Anion Gap 14.0 (H) 0.0 - 11.9    Glucose 140 (H) 65 - 99 mg/dL    Bun 26 (H) 8 - 22 mg/dL    Creatinine 0.66 0.50 - 1.40 mg/dL    Calcium 9.5 8.5 - 10.5 mg/dL    AST(SGOT) 17 12 - 45 U/L    ALT(SGPT) 13 2 - 50 U/L    Alkaline Phosphatase 91 30 - 99 U/L    Total Bilirubin 1.0 0.1 - 1.5 mg/dL    Albumin 4.8 3.2 - 4.9 g/dL    Total Protein 7.9 6.0 - 8.2 g/dL    Globulin 3.1 1.9 - 3.5 g/dL    A-G Ratio 1.5 g/dL   BETA-HCG QUALITATIVE SERUM   Result Value Ref Range    Beta-Hcg Qualitative Serum Negative Negative   MAGNESIUM   Result Value Ref Range    Magnesium 1.8 1.5 - 2.5 mg/dL   ESTIMATED GFR   Result Value Ref Range    GFR If African American >60 >60 mL/min/1.73 m 2    GFR If Non African American >60 >60 mL/min/1.73 m 2   Urinalysis   Result Value Ref Range    Micro Urine Req Microscopic     Color Dark Yellow     Character Cloudy (A)     Specific Gravity 1.030 <1.035    Ph 6.0 5.0 - 8.0    Glucose Negative Negative mg/dL    Ketones Trace (A) Negative mg/dL    Protein 100 (A) Negative mg/dL    Bilirubin Negative Negative    Urobilinogen, Urine Normal Negative    Nitrite Negative Negative    Leukocyte Esterase Trace (A) Negative    Occult Blood Negative Negative   URINE MICROSCOPIC (W/UA)   Result Value Ref Range    WBC 0-2 /hpf    Mucous Threads Few /hpf    Amorphous Crystal Present /hpf   EKG (ER)   Result Value Ref Range    Report       [facility] Emergency [facility]    Test Date:  2017-11-09  Pt Name:    LAZARO PEÑALOZA                 Department: ER  MRN:        [id number]                      Room:       [facility]  Gender:     F                             Technician: [id number]  :        1990                   Requested By:BRANDAN STEVE  Order #:    [id number]                    Reading MD: BRANDAN STEVE DO    Measurements  Intervals                                Axis  Rate:       88                           P:          63  UT:         144                          QRS:        88  QRSD:       96                           T:          31  QT:         356  QTc:        431    Interpretive Statements  SINUS RHYTHM  Compared to ECG 2017 07:34:55  Right-axis deviation no longer present  T-wave abnormality no longer present    Electronically Signed On 2017 5:33:04 PST by BRANDAN STEVE DO       RADIOLOGY  CT-HEAD W/O   Final Result      No acute intracranial abnormality.          COURSE & MEDICAL DECISION MAKING  Patient seen and evaluated immediately upon arrival by mazin from triage with active seizure.    Medical record review: with abnormal EEG , although without epileptiform seizure activity. Patient also with previous ED evaluations for abdominal pain, history of cyclic vomiting polysubstance abuse.    ED evaluation for seizure most concerning for an arrhythmia induced seizure. Patient seizing from lobby on initial evaluation, Ativan 2 mg given after peripheral IV placement. Patient with recurrent seizure shortly thereafter without return to baseline, additional Ativan given, however upon my arrival to the room patient appeared to be in a ventricular tachycardia. Rhythm was self limiting, patient somnolent but communicative. Amiodarone 150 mg over 10 minutes ordered. Patient seized again with telemetry evidence for ventricular tachycardia. Rhythmic artifact considered, however with review of rhythm strips and bedside evaluation seizure onset just after arrhythmia develops on telemetry. Patient shocked ×1 with return to sinus rhythm, amiodarone infusion continued with a drip following bolus. Magnesium sulfate 2 g given IV push. Patient has  had no recurrent seizure since this time. Rhythm strips are available on the chart. EKG sinus rhythm, without ectopy or evidence for ischemia. Labs with a nonspecific leukocytosis, left shift without bandemia. Mild hypokalemia, potassium 3.3, magnesium 1.8, otherwise without electrolyte derangement. Normal glucose. Renal function preserved. Pregnancy negative. 40 mEq of potassium chloride also ordered intravenously. Patient now communicative, admits to seizure disorder, never known previously associated with arrhythmia. Admits to marijuana use, denies recent heroine use. Drug screen pending. Patient neurologically intact on reevaluation. She will be admitted to the intensive care unit for continued observation.    4:09 AM [facility] where the patient is agreeable to admission.    0425 - [facility] at bedside.    4:34 AM Cardiology, Dr. Linares, is where the patient is agreeable consultation.    The total critical care time on this patient is 40 minutes, continuous hemodynamic monitoring and multiple bedside evaluations and neurologic checks, resuscitating patient and assess her response to treatment, speaking with admitting and consulting physician, and arranging for ICU admission. This 40 minutes is exclusive of separately billable procedures.      FINAL IMPRESSION  (R56.9) Seizure (CMS-[facility])  (primary encounter diagnosis)  (I47.2) Ventricular tachycardia (CMS-H[facility])  (E87.6) Hypokalemia  (F19.10) Polysubstance abuse  Critical care, 40 minutes    Electronically signed by: Belia Jaramillo, 11/9/2017 4:09 AM      This dictation was created using voice recognition software. The accuracy of the dictation is limited to the abilities of the software. I expect there may be some errors of grammar and possibly content. The nursing notes were reviewed and certain aspects of this information were incorporated into this note.

## 2017-11-09 NOTE — ASSESSMENT & PLAN NOTE
# VTACH ON TELE during the seizure episode    In the cardiac monitor showed VTach while patient was having an episode of seizure, no ECG to confirm. Status post cardioversion  Also patient found to have low magnesium and potassium and received 2 gram mag and correction of potassium with 40 meq of potassium by ER physician. Cardiology consulted. They evaluated the patient and no indication to continue the amlodipine drip, it was discontinue. They recommended and ECHO for further evaluation of the Left ventricle function. ECHO was done and showed EF 60% with normal left ventricle.   Patient left AMA before results could be discussed.

## 2017-11-09 NOTE — THERAPY
"Speech Language Therapy Clinical Swallow Evaluation completed.  Functional Status: Clinical swallow evaluation completed on this date. Patient lethargic but able to arouse and stated she wanted to participate.  She followed directives to the oral [facility] exam which revealed diffuse mild-moderate weakness, reduced lingual ROM and coordination.  Patient consumed single ice chips x2 with no overt s/sx of aspiration, however, she began to doze off and was not responding to verbal or tactile cues.  When educated to recommendations of continued NPO status, the patient opened her eyes and said, with strong intensity, \"I can stay awake!\"  Remaining PO presentation included nectars, purees, mixed consistencies and thin liquids.  The patient presented with prolonged mastication of soft solids (~30 seconds), facial grimacing during the swallow and c/o soft solids \"sticking in the back of my throat.\"  She took multiple swallows per sip, had delayed initiation of swallow trigger but adequate laryngeal elevation upon palpation.  As the session progressed, the patient had a bite of mixed consistencies in her oral cavity and began to have seizure-like activity.  A swallow was palpated but she did not fully open her oral cavity for visualization.  After several minutes, \"shaking\" stopped with no bolus observed in the oral cavity.  Provided education to recommendations of NPO status, given concerns for aspiration while having seizure-like activity.  Patient then opened her eyes and said, \"Can't you just give me some pain pills?  That's what I'm here for!\"  At this time, recommend the patient remain NPO with reassessment in the AM, given periods of seizure-like activity, concerns that the patient will aspirate should she have a seizure while eating, and mild-moderate oral dysphagia.  SLP following closely and will reassess the patient in the AM.    Recommendations - Diet: Diet / Liquid Recommendation: NPO, Pre-Feeding Trials with SLP " "Only                          Strategies: to be assessed                           Medication Administration: Medication Administration : Other (See Comments) (NPO)  Plan of Care: Will benefit from Speech Therapy 3 times per week  Post-Acute Therapy: Discharge to home with outpatient or home health for additional skilled therapy services.    See \"Rehab Therapy-Acute\" Patient Summary Report for complete documentation.   "

## 2017-11-09 NOTE — CONSULTS
DATE OF SERVICE:  2017    REQUESTING PHYSICIAN:  Jeremy Gonda, MD    REASON FOR CONSULTATION:  Status epilepticus or intractable seizure.    HISTORY OF PRESENT ILLNESS:  The patient is a 27-year-old right-handed female   with past medical history significant for seizure disorder started at age 11,   history of gastroesophageal reflux disease and history of drug abuse, who   presented to emergency room complaining of abdominal pain associated with   severe nausea and vomiting with subsequent seizure.  Apparently, she presented   to the emergency room initially with severe abdominal pain and nausea and   vomiting and while she was in the emergency room, she had 1 episode of   generalized tonic-clonic seizure.  This was associated with likely V-tach on   telemetry, requiring cardioversion.  She was also loaded with amiodarone 150   mg and was given 2 g of magnesium.  Apparently, she had 2 more episodes of   seizure while she was in the emergency room requiring IV benzodiazepines.  She   has a history of previous admission for seizure and there is a high suspicion   for pseudoseizure.    PAST MEDICAL HISTORY:  Significant for seizure disorder with suspicious with   pseudoseizure in the past, history of gastroesophageal reflux disease,   associated with severe abdominal pain, nausea and vomiting that happened   during her menstrual period.  She also has history of IV drug abuse and   scoliosis.    MEDICATIONS:  Reviewed as per MAR.    ALLERGIES:  DEPAKOTE, RISPERDAL, SERTRALINE, AND TEGRETOL.    FAMILY HISTORY:  Notable for seizure disorder.  She says her brother  from   intractable seizure.    SOCIAL HISTORY:  She smokes half a pack per day for several years.  She has   history of polysubstance abuse including methamphetamine, cocaine and heroin   in the past.  She denies ongoing drug abuse, except smoking occasional   marijuana.  She has no history of drinking.    REVIEW OF SYSTEMS:  As above.  All other  systems are normal.  Please also see   Dr. Gonda's review of system in his H and P dated 11/09/2017.    PHYSICAL EXAMINATION:  VITAL SIGNS:  Today, heart rate 96 and regular, blood pressure 115/72,   respirations 23, O2 sat 100% on room air, and temperature 37.3.  NECK:  Supple.  No carotid bruit.  CARDIOVASCULAR:  Regular rate and rhythm.  LUNGS:  Clear.  ABDOMEN:  Soft.  EXTREMITIES:  No cyanosis or clubbing.  NEUROLOGIC:  She is awake, alert, but very, very drowsy.  She has a hard time   to keep her eyes open, although she is able to follow command and move all 4   extremities.  Her pupils are equal and reactive.  Extraocular movements are   full.  Visual fields are full to confrontation.  Facial motor and sensation   intact.  Hearing is intact to finger rub bilaterally.  Tongue in midline and   protrudes symmetrically.  Palate elevates symmetrically.  Shoulder shrugs are   normal.  Motor examination revealed normal strength to direct testing of both   upper and lower extremity, although she has some generalized weakness due to   being very lethargic.  Sensation intact to light touch, temperature and   pinprick.  Coordination intact finger-to-nose testing and heel-to-shin testing   is normal.  Deep tendon reflexes are 2+ and equal.  Plantar reflexes are   equivocal.    ASSESSMENT AND PLAN:  A 27-year-old female with intractable seizure.  Patient   has history of seizure disorder, although there is a suspicion for   nonepileptic event in the past.  She says she started having seizure at age   11; however, she has not been on any seizure medication as she is not   following up with a neurologist.  We would continue with EEG monitoring to see   if we can capture some of her seizure episode to determine whether these are   true seizure or nonepileptic event.  She was not started on seizure   medication, I will not start seizure medication at this point until we obtain   EEG to see if we can capture some of her event.   Of note, her drug screen was   positive for opiates and cannabinoids.    Total critical care time spent was 35 minutes.       ____________________________________     MD PHOENIX Pedroza / DIMITRI    DD:  11/09/2017 13:01:41  DT:  11/09/2017 13:31:58    D#:  [id number]  Job#:  [id number]

## 2017-11-09 NOTE — CONSULTS
Cardiology Interval Note    Name Rupa Yang     1990   Age/Sex 27 y.o. female   MRN [id number]       Attending: Dr. Uribe   Resident: Dr. Reyez       Chief Complaint:  Abdominal pain    HPI:  27 year old female with a past medical history of seizure-like activity believed to be pseudoseizures, heart burn and substance abuse presented to the ED with complaints of abdominal pain. Patient was recently briefly admitted two days prior to this admission after being brought into there ED by [facility] for seizure like activity. Patient underwent an EEG in 2015 that showed non-specific and diffuse encephalopathic process likely due to benzodiazepine therapy as no clinical seizure activity was seen on video. She has been seen by neurology multiple times in the past with suspicion for non-epileptic psychogenic (pseudo)seizure. Patient presented to the ED this admission with abdominal pain that radiated to her back. In the triage area the patient began experiencing tonic-clinic shaking and was given 2mg of IV ativan with resolution of her shaking. On tele it appeared as if she had an abnormal rhythm of vtach and was defibrillated once with resolution of the abnormal rhythm. The patient was coherent and remembers the shock and states that it was very painful. She was then initiated on an amiodarone drip and admitted to the ICU. The patient denies any substance abuse and states that she has not used any illicit drugs since she was 12. She states that all she has used is her prescribed opiates and she tried smoking marijuana yesterday. The morning of admission as we were visiting with her she began shaking with another tonic clonic shaking episode. Rhythm strip was reviewed by the cardiology team and it appeared that the patient was not in ventricular tachycardia, but was in normal sinus rhythm with artifacts, likely from shaking.     Review of Systems   Constitutional: Negative for chills and fever.  "  Respiratory: Positive for shortness of breath. Negative for cough.    Cardiovascular: Negative for chest pain, palpitations, orthopnea, leg swelling and PND.   Gastrointestinal: Positive for abdominal pain. Negative for blood in stool, constipation, diarrhea, melena, nausea and vomiting.   Genitourinary: Negative for dysuria.   Musculoskeletal: Positive for back pain and falls.   Skin: Negative for rash.   Neurological: Positive for seizures and loss of consciousness. Negative for sensory change, speech change, focal weakness and headaches.             Past Medical History:   Past Medical History:   Diagnosis Date   • Arrhythmia     \"it feels like palpitations\"   • Dental disorder     \"my teeth are completely dead. Exposed nerves\"   • Eczema     arms, neck, mild currently   • Gastroenteritis    • Heart burn    • Indigestion    • Other specified disorder of intestines     diarrhea   • Pain 02-24-15    chronic back, abd., 2/10   • Scoliosis     dx age 10, chirpractor told severe curvature, left leg shorter.    • Substance abuse     meth, coke, heroin, marijuana stopped after parents OD when she age 12.        Past Surgical History:  Past Surgical History:   Procedure Laterality Date   • JUDY BY LAPAROSCOPY  3/3/2015    Procedure: JUDY BY LAPAROSCOPY;  Surgeon: Aftab Churchill M.D.;  Location: SURGERY [facility];  Service:    • PRIMARY C SECTION  11/27/2013    Performed by Haider Garcia M.D. at [facility] DEL[facility]ERY       Current Outpatient Medications:  Home Medications    **Home medications have not yet been reviewed for this encounter**         Medication Allergy/Sensitivities:  Allergies   Allergen Reactions   • Divalproex Sodium Hives and Rash         • Risperidone Hives and Rash         • Sertraline Hcl [Zoloft] Hives and Rash         • Tegretol [Carbamazepine] Hives and Rash               Family History:  Family History   Problem Relation Age of Onset   • Alcohol/Drug Mother      drugs   • Diabetes Mother  " "    NIDDM   • Alcohol/Drug Father      drugs   • Alcohol/Drug Brother    • Hypertension Maternal Aunt    • Diabetes Maternal Aunt      IDDM   • Diabetes Maternal Grandmother      IDDM   • Diabetes Maternal Grandfather      IDDM   • Cancer Paternal Grandmother      breast   • Hypertension Paternal Grandmother        Social History:  Social History     Social History   • Marital status:      Spouse name: N/A   • Number of children: N/A   • Years of education: N/A     Occupational History   • Not on file.     Social History Main Topics   • Smoking status: Current Every Day Smoker     Packs/day: 0.50     Types: Cigarettes   • Smokeless tobacco: Never Used   • Alcohol use No   • Drug use: Unknown      Comment: in past meth, cocaine, weed, states last used \"a long time ago\"   • Sexual activity: Yes     Partners: Male      Comment: planned pregnancy FOB involved     Other Topics Concern   • Not on file     Social History Narrative   • No narrative on file       Physical Exam     Vitals:    11/09/17 0602 11/09/17 0700 11/09/17 0800 11/09/17 0900   BP:       Pulse:  (!) 101 93 89   Resp:  (!) 30 (!) 36 (!) 33   Temp: 37.6 °C (99.7 °F)  37.5 °C (99.5 °F)    SpO2:  (!) 62% 96% 99%   Weight: 56.7 kg (125 lb)      Height:         Body mass index is 21.46 kg/m².  /80   Pulse 89   Temp 37.5 °C (99.5 °F)   Resp (!) 33   Ht 1.626 m (5' 4\")   Wt 56.7 kg (125 lb)   LMP 11/09/2017   SpO2 99%   BMI 21.46 kg/m²   O2 therapy: Pulse Oximetry: 99 %    Physical Exam   Constitutional: She is oriented to person, place, and time and well-developed, well-nourished, and in no distress. No distress.   HENT:   Head: Normocephalic and atraumatic.   Right Ear: External ear normal.   Left Ear: External ear normal.   Mouth/Throat: Oropharyngeal exudate present.   Eyes: Conjunctivae and EOM are normal. Pupils are equal, round, and reactive to light. Right eye exhibits no discharge. Left eye exhibits no discharge. No scleral " icterus.   Neck: Normal range of motion. Neck supple. No JVD present.   Cardiovascular: Normal rate, regular rhythm, normal heart sounds and intact distal pulses.  Exam reveals no gallop and no friction rub.    No murmur heard.  Pulmonary/Chest: Effort normal and breath sounds normal. No respiratory distress.   Abdominal: Soft. Bowel sounds are normal. She exhibits no distension and no mass. There is tenderness. There is no rebound and no guarding.   Musculoskeletal: Normal range of motion. She exhibits edema. She exhibits no tenderness.   Lymphadenopathy:     She has no cervical adenopathy.   Neurological: She is alert and oriented to person, place, and time. No cranial nerve deficit.   Skin: Skin is warm and dry. No rash noted. She is not diaphoretic. No erythema. No pallor.             Data Review       Lab Data Review:  Recent Results (from the past 24 hour(s))   Urinalysis    Collection Time: 11/09/17  3:30 AM   Result Value Ref Range    Micro Urine Req Microscopic     Color Dark Yellow     Character Cloudy (A)     Specific Gravity 1.030 <1.035    Ph 6.0 5.0 - 8.0    Glucose Negative Negative mg/dL    Ketones Trace (A) Negative mg/dL    Protein 100 (A) Negative mg/dL    Bilirubin Negative Negative    Urobilinogen, Urine Normal Negative    Nitrite Negative Negative    Leukocyte Esterase Trace (A) Negative    Occult Blood Negative Negative   Urine drug screen    Collection Time: 11/09/17  3:30 AM   Result Value Ref Range    Amphetamines Urine Negative Negative    Barbiturates Negative Negative    Benzodiazepines Negative Negative    Cocaine Metabolite Negative Negative    Methadone Negative Negative    Opiates Positive (A) Negative    Oxycodone Negative Negative    Phencyclidine -Pcp Negative Negative    Propoxyphene Negative Negative    Cannabinoid Metab Positive (A) Negative   URINE MICROSCOPIC (W/UA)    Collection Time: 11/09/17  3:30 AM   Result Value Ref Range    WBC 0-2 /hpf    Mucous Threads Few /hpf     Amorphous Crystal Present /hpf   CBC WITH DIFFERENTIAL    Collection Time: 11/09/17  3:37 AM   Result Value Ref Range    WBC 18.5 (H) 4.8 - 10.8 K/uL    RBC 4.77 4.20 - 5.40 M/uL    Hemoglobin 16.0 12.0 - 16.0 g/dL    Hematocrit 44.1 37.0 - 47.0 %    MCV 92.5 81.4 - 97.8 fL    MCH 33.5 (H) 27.0 - 33.0 pg    MCHC 36.3 (H) 33.6 - 35.0 g/dL    RDW 42.5 35.9 - 50.0 fL    Platelet Count 233 164 - 446 K/uL    MPV 10.7 9.0 - 12.9 fL    Neutrophils-Polys 86.40 (H) 44.00 - 72.00 %    Lymphocytes 6.60 (L) 22.00 - 41.00 %    Monocytes 6.20 0.00 - 13.40 %    Eosinophils 0.10 0.00 - 6.90 %    Basophils 0.20 0.00 - 1.80 %    Immature Granulocytes 0.50 0.00 - 0.90 %    Nucleated RBC 0.00 /100 WBC    Neutrophils (Absolute) 16.01 (H) 2.00 - 7.15 K/uL    Lymphs (Absolute) 1.23 1.00 - 4.80 K/uL    Monos (Absolute) 1.15 (H) 0.00 - 0.85 K/uL    Eos (Absolute) 0.01 0.00 - 0.51 K/uL    Baso (Absolute) 0.03 0.00 - 0.12 K/uL    Immature Granulocytes (abs) 0.09 0.00 - 0.11 K/uL    NRBC (Absolute) 0.00 K/uL   COMP METABOLIC PANEL    Collection Time: 11/09/17  3:37 AM   Result Value Ref Range    Sodium 133 (L) 135 - 145 mmol/L    Potassium 3.3 (L) 3.6 - 5.5 mmol/L    Chloride 96 96 - 112 mmol/L    Co2 23 20 - 33 mmol/L    Anion Gap 14.0 (H) 0.0 - 11.9    Glucose 140 (H) 65 - 99 mg/dL    Bun 26 (H) 8 - 22 mg/dL    Creatinine 0.66 0.50 - 1.40 mg/dL    Calcium 9.5 8.5 - 10.5 mg/dL    AST(SGOT) 17 12 - 45 U/L    ALT(SGPT) 13 2 - 50 U/L    Alkaline Phosphatase 91 30 - 99 U/L    Total Bilirubin 1.0 0.1 - 1.5 mg/dL    Albumin 4.8 3.2 - 4.9 g/dL    Total Protein 7.9 6.0 - 8.2 g/dL    Globulin 3.1 1.9 - 3.5 g/dL    A-G Ratio 1.5 g/dL   BETA-HCG QUALITATIVE SERUM    Collection Time: 11/09/17  3:37 AM   Result Value Ref Range    Beta-Hcg Qualitative Serum Negative Negative   MAGNESIUM    Collection Time: 11/09/17  3:37 AM   Result Value Ref Range    Magnesium 1.8 1.5 - 2.5 mg/dL   ESTIMATED GFR    Collection Time: 11/09/17  3:37 AM   Result Value Ref  Range    GFR If African American >60 >60 mL/min/1.73 m 2    GFR If Non African American >60 >60 mL/min/1.73 m 2   EKG (ER)    Collection Time: 17  4:00 AM   Result Value Ref Range    Report       [facility] Emergency Dept.    Test Date:  2017  Pt Name:    LAZARO PEÑALOZA                 Department: ER  MRN:        [id number]                      Room:        12  Gender:     F                            Technician: [id number]  :        1990                   Requested By:BRANDAN STEVE  Order #:    [id number]                    Reading MD: BRANDAN STEVE DO    Measurements  Intervals                                Axis  Rate:       88                           P:          63  MI:         144                          QRS:        88  QRSD:       96                           T:          31  QT:         356  QTc:        431    Interpretive Statements  SINUS RHYTHM  Compared to ECG 2017 07:34:55  Right-axis deviation no longer present  T-wave abnormality no longer present    Electronically Signed On 2017 5:33:04 PST by BRANDAN STEVE DO     Creatine Kinase (CPK)    Collection Time: 17  7:23 AM   Result Value Ref Range    CPK Total 77 0 - 154 U/L   Lactic acid (lactate)    Collection Time: 17  7:23 AM   Result Value Ref Range    Lactic Acid 0.9 0.5 - 2.0 mmol/L   Prolactin    Collection Time: 17  7:23 AM   Result Value Ref Range    Prolactin 25.58 2.80 - 26.00 ng/mL   PROCALCITONIN    Collection Time: 17  7:23 AM   Result Value Ref Range    Procalcitonin 0.06 <0.25 ng/mL   MAGNESIUM    Collection Time: 17  7:23 AM   Result Value Ref Range    Magnesium 2.3 1.5 - 2.5 mg/dL       Imaging/Procedures Review:      CT-HEAD W/O   Final Result      No acute intracranial abnormality.      ECHOCARDIOGRAM COMP W/O CONT    (Results Pending)          EKG:   EKG Independant Review: Completed  HR: 88, Normal Sinus Rhythm, no ST/T changes            Impression:  1. Non-epileptic psychogenic seizures  2. Normal sinus rhythm with artifact s/p defibrillation. Tele strips reviewed with cardiologist. Was not consistent with vtach.  3. Abdominal pain  4. Leukocytosis  5. Hyponatremia  6. Hypokalemia  7. Chronic pain    Recommendations:  - Neurology consulted and following  - EEG pending  - Echocardiogram heart ordered  - D/C amiodarone drip  - Strongly recommend psychiatry consult    Thank you for the opportunity to participate in the care of this patient          Quality Measures  Quality-Core Measures

## 2017-11-09 NOTE — H&P
DATE OF ADMISSION:  2017    CHIEF COMPLAINT:  Seizures and cyclic vomiting.    HISTORY OF PRESENT ILLNESS:  The patient is a 27-year-old female with multiple   admissions in the past for potential seizures that have been diagnosed as   pseudoseizures by neurology after normal video EEG who presents to the   emergency department today complaining of acute-on-chronic abdominal pain and   cyclical vomiting that started yesterday.  She states that these symptoms come   on monthly with her period and are difficult to control as an outpatient for   which she has been admitted many times in the past.  As patient was being   taken back to the room, she had episodes that appeared to be seizure-like   activity that resolved with Ativan.  She had a second and again third episode,   but this time on telemetry was noted to be likely V-tach that was sustaining,   requiring cardioversion x1 and loading of 150 mg of amiodarone as well as   being given 2 g of magnesium.  After the V-tach episode ended, her seizure   ended as well.  Again, this happened 2 separate occasions.  Per the patient,   she has not had arrhythmias noted with any of her seizures in the past.  She   does have an aura of her seizures coming on where she felt lightheaded,   nauseated and hand start wringing, at which point she goes into the seizure.    Of note, her brother  of a seizure under odd circumstances and both her   parents  at early ages as well.  She is unclear whether anybody had any   cardiac disease that she is aware of, although they did all have significant   drug and alcohol abuse history.  Cardiology has been consulted in the ER as   well and she will be admitted to the intensive care unit in the setting of   V-tach and status epilepticus.    PAST MEDICAL HISTORY:  Includes seizure versus pseudoseizure disorder,   gastroesophageal reflux disease, cyclical nausea and vomiting syndrome, IV   drug abuse, and scoliosis.    ALLERGIES:   DEPAKOTE, RISPERDAL, SERTRALINE AND TEGRETOL.    OUTPATIENT MEDICATIONS:  Include ibuprofen.    PAST SURGICAL HISTORY:  Includes  and laparoscopic cholecystectomy.    SOCIAL HISTORY:  Remarkable for ongoing tobacco abuse, half pack of cigarettes   per day.  Positive history of methamphetamine, cocaine, and heroin use in the   past, but denies any recently, but does admit to smoking marijuana   frequently.  She denies alcohol use.  She is single.    FAMILY HISTORY:  Remarkable for seizure disorder, early deaths of unknown   etiology and IV drug abuse history.    REVIEW OF SYSTEMS:  Please see history of present illness; otherwise, negative   for fevers, chills, neck stiffness, headache, urinary incontinence, diarrhea   and positive nausea, vomiting, abdominal pain and current menstrual cramps and   vaginal bleeding.    PHYSICAL EXAMINATION:  VITAL SIGNS:  Temperature afebrile, heart rate of 108, respiratory rate of 14,   oxygen saturation 96% on room air and a blood pressure 104/65.  GENERAL:  Chronically ill-appearing young female, thin and drowsy.  HEENT:  Normocephalic, atraumatic.  Pupils are 3-4 mm and reactive bilaterally   without scleral icterus or conjunctival pallor.  She has moist oral mucosal   membranes without evidence of intraoral trauma.  NECK:  Supple.  Trachea is midline.  There is no stridor and no jugular venous   distention.  No meningismus.  CARDIOVASCULAR:  Sinus tachycardia.  No murmurs, rubs or gallops.    Nondisplaced PMI.  Radial pulses are 2+ and strong.  Brisk capillary refill.  PULMONARY:  Clear to auscultation bilaterally without wheezing, rhonchi, or   rales.  Breathing comfortably and speaking in full sentences.  ABDOMEN:  Soft and nondistended.  Positive bowel sounds.  There is mild   tenderness in the epigastrium.  GENITOURINARY:  Deferred.  EXTREMITIES:  No clubbing, cyanosis or edema.  No calf asymmetry.  No palpable   cord.  NEUROLOGIC:  Patient is drowsy, but wakes to  voice, follows commands, moves   all extremities and oriented x4.  No focal deficits.  SKIN:  Warm, pink and dry without lesions or rash.    LABORATORY DATA:  CBC with a white count of 19,000, hemoglobin 16, platelets   of 233 with a left shift on the differential.  Chemistry:  Sodium 133,   potassium 3.3, chloride 96, bicarbonate 23, BUN 26, creatinine 0.66, and   glucose 140.  Anion gap of 14.  LFTs within normal limits.  Magnesium 1.8.    Urine drug screen positive for opiates and cannabinoids.  Urinalysis is   cloudy, trace ketones, 100 protein, negative nitrite, trace leukocyte   esterase, but only 0-2 white cells.  Beta-hCG negative.    IMAGIN.  CT head without acute intracranial process.  2.  EKG shows normal sinus rhythm with a rate of 88 with borderline   nonspecific intraventricular conduction delay, especially in lead V1 and V2,   given concern for possible Brugada syndrome, a QTC interval of 431.  3.  Rhythm strips noted showing degradation of normal rhythm into wide complex   tachycardia with flipped axis consistent with V-tach.    ASSESSMENT:  1.  Acute sustained V-tach, status post cardioversion into sinus tachycardia.  2.  Seizures versus pseudoseizures with status epilepticus.  3.  Cyclical vomiting syndrome.  4.  IV drug abuse and marijuana abuse.  5.  Leukocytosis, likely secondary to demargination, doubt infection.  6.  Hypokalemia.  7.  Hypomagnesemia.  8.  Hyponatremia.  9.  Abnormal EKG with differential diagnosis of these recurrent   pseudoseizures, now with witnessed V-tach includes possible Brugada syndrome.    PLAN:  Patient is critically ill at this time and will be admitted to the   intensive care unit for close observation and monitoring.  She will be   continued on seizure precautions with p.r.n. benzodiazepines and will order a   repeat EEG.  Cardiology has been consulted and we appreciate their evaluation   and recommendations with regards to her V-tach and abnormal EKG and in  the   meantime, we will continue her on amiodarone infusion to complete a 24-hour IV   load.  We will hold off on initiation of antiepileptics at this time given   the possibility of this being pseudoseizure, especially given her history of   prior normal EEGs.  Her electrolytes will be replaced and monitored closely,   including her potassium and magnesium levels.  She will be provided   antiemetics and we will monitor her for drug withdrawal symptoms.  Patient is   critically ill at this time and we appreciate the opportunity to assist in her   care and will continue to follow along closely with you.    Critical care time 35 minutes.  No time overlap.  Procedures are not included   in this time.    [id number].       ____________________________________     Jeremy Gonda, MD JG / DIMITRI    DD:  11/09/2017 06:28:38  DT:  11/09/2017 07:48:37    D#:  [id number]  Job#:  [id number]

## 2017-11-09 NOTE — ED NOTES
"Chief Complaint   Patient presents with   • Seizure     /80   Pulse (!) 102   Resp 19   Ht 1.626 m (5' 4\")   Wt 54.4 kg (120 lb)   LMP 11/09/2017   SpO2 97%   BMI 20.60 kg/m²     Pt walked into lobby, while checking in began to have seizure. Placed on gurney and to [address], ERP at bedside. PIV established, 2mg ativan given, labs collected and sent. When seizure resolved pt A/O x4, denied further symptoms at this time. Reports intense abdominal pain and states \"this happens every month with my period.\" Reports hx of IV drug use but states last time was \" a long time ago.\"   "

## 2017-11-09 NOTE — ASSESSMENT & PLAN NOTE
Patient had 2 Episodes seizure in the ED,  Lasting less than one min with no confusion after the episode. Patient received 2mg lorazepam on both occasions. patient did not had Hypocalcemia, hypoglycemia or acid base disturbance that could cause seizure. A Head CT done and negative for acute intracranial abnormality. Neurology consulted and evaluated the patient. Recommended EEG for further evaluation. Patient refuse the EEG test.   Patient left AMA

## 2017-11-09 NOTE — H&P
Internal Medicine Admitting History and Physical    Note Author: Jevon Nunes M.D.       Name Rupa Ynag     1990   Age/Sex 27 y.o. female   MRN [id number]   Code Status Full code      After 5PM or if no immediate response to page, please call for cross-coverage  Attending/Team:Dr Gonda See Patient List for primary contact information  Call [phone] to page    1st Call - Day Intern (R1):   Giovanny 2nd Call - Day Sr. Resident (R2/R3):   Des       Chief Complaint:  Seizure, abdominal pain    HPI:  Rupa Yang is a 27 y.o. Female  with past medical history of multiple seizure activities and abdominal pain was the conclusion is pseudoseizures due to lack of seizure activity with EEG video, neurology evaluation and lack of confusion after seizure episodes, the patient is polysubstance use   Presented to the hospital with not feeling well, abdominal pain, two seizure episode since discharged on 2017, she has one episode in the ER received 2 mg lorazepam, then she has another episode of seizure in the ER and this time has a Vtach on the cardiac monitor in the ER, nurse described the movement as a tonic-clonic movement with partial open eye mostly closed, patient received a 2 mg lorazepam, cardioversion with shock, and started on amiodarone, found with mag 1.8 and received a 2 gram of mag, potassium was 3.3 and will receive a 40 meq of potassium, cardiology was called by the ER physician     Both seizure episodes last less than one minute, no bite tongue, no head trauma, no urine or fecal incontinence. Was alert and oreiented after episode      Review of Systems   Constitutional: Negative for chills, fever and weight loss.   HENT: Negative for sore throat.    Eyes: Negative for blurred vision and double vision.   Respiratory: Negative for cough and shortness of breath.    Cardiovascular: Negative for chest pain, palpitations and orthopnea.   Gastrointestinal: Positive for  "abdominal pain. Negative for constipation, diarrhea, nausea and vomiting.   Genitourinary: Negative for dysuria and urgency.   Musculoskeletal: Positive for back pain and falls.   Neurological: Positive for seizures and loss of consciousness. Negative for dizziness, tingling, sensory change, speech change and headaches.              Past Medical History:   Past Medical History:   Diagnosis Date   • Arrhythmia     \"it feels like palpitations\"   • Dental disorder     \"my teeth are completely dead. Exposed nerves\"   • Eczema     arms, neck, mild currently   • Gastroenteritis    • Heart burn    • Indigestion    • Other specified disorder of intestines     diarrhea   • Pain 02-24-15    chronic back, abd., 2/10   • Scoliosis     dx age 10, chirpractor told severe curvature, left leg shorter.    • Substance abuse     meth, coke, heroin, marijuana stopped after parents OD when she age 12.        Past Surgical History:  Past Surgical History:   Procedure Laterality Date   • JUDY BY LAPAROSCOPY  3/3/2015    Procedure: JUDY BY LAPAROSCOPY;  Surgeon: Aftab Churchill M.D.;  Location: SURGERY [facility];  Service:    • PRIMARY C SECTION  11/27/2013    Performed by Haider Garcia M.D. at LABOR AND DELIVERY       Current Outpatient Medications:  Home Medications    **Home medications have not yet been reviewed for this encounter**         Medication Allergy/Sensitivities:  Allergies   Allergen Reactions   • Divalproex Sodium Hives and Rash         • Risperidone Hives and Rash         • Sertraline Hcl [Zoloft] Hives and Rash         • Tegretol [Carbamazepine] Hives and Rash               Family History:  Family History   Problem Relation Age of Onset   • Alcohol/Drug Mother      drugs   • Diabetes Mother      NIDDM   • Alcohol/Drug Father      drugs   • Alcohol/Drug Brother    • Hypertension Maternal Aunt    • Diabetes Maternal Aunt      IDDM   • Diabetes Maternal Grandmother      IDDM   • Diabetes Maternal Grandfather      " "IDDM   • Cancer Paternal Grandmother      breast   • Hypertension Paternal Grandmother        Social History:  Social History     Social History   • Marital status:      Spouse name: N/A   • Number of children: N/A   • Years of education: N/A     Occupational History   • Not on file.     Social History Main Topics   • Smoking status: Current Every Day Smoker     Packs/day: 0.50     Types: Cigarettes   • Smokeless tobacco: Never Used   • Alcohol use No   • Drug use: Unknown      Comment: in past meth, cocaine, weed, states last used \"a long time ago\"   • Sexual activity: Yes     Partners: Male      Comment: planned pregnancy FOB involved     Other Topics Concern   • Not on file     Social History Narrative   • No narrative on file     Living situation: [place]  PCP : Pcp Not In Computer    Physical Exam     Vitals:    11/09/17 0332 11/09/17 0334   BP:  113/80   Pulse:  (!) 102   Resp:  19   SpO2:  97%   Weight: 54.4 kg (120 lb)    Height: 1.626 m (5' 4\")      Body mass index is 20.6 kg/m².  /80   Pulse (!) 102   Resp 19   Ht 1.626 m (5' 4\")   Wt 54.4 kg (120 lb)   LMP 11/09/2017   SpO2 97%   BMI 20.60 kg/m²   O2 therapy: Pulse Oximetry: 97 %    Physical Exam   Constitutional: She is well-developed, well-nourished, and in no distress. No distress.   HENT:   Head: Normocephalic and atraumatic.   Eyes: Pupils are equal, round, and reactive to light. No scleral icterus.   Neck: No JVD present. No tracheal deviation present.   Cardiovascular: Normal heart sounds.    No murmur heard.  Pulmonary/Chest: No respiratory distress. She has no wheezes. She has no rales.   Abdominal: Soft. She exhibits no distension. There is no tenderness. There is no rebound and no guarding.   Musculoskeletal: Normal range of motion. She exhibits no edema or tenderness.   Lymphadenopathy:     She has no cervical adenopathy.   Neurological: No cranial nerve deficit.   Patient at the time of exam sleepy but arousable   Pupils " were equally round and reactive to light.   All cranial nerves , Motor and sensation unable to assess due to patient sleeping   Reflexes were normal and symmetrical in both upper and lower extremities  Normal tone and muscle mass        Skin: Skin is warm. No rash noted. She is not diaphoretic.     Data Review       Old Records Request:   Completed  Current Records review and summary: Completed    Lab Data Review:  Recent Results (from the past 24 hour(s))   CBC WITH DIFFERENTIAL    Collection Time: 11/09/17  3:37 AM   Result Value Ref Range    WBC 18.5 (H) 4.8 - 10.8 K/uL    RBC 4.77 4.20 - 5.40 M/uL    Hemoglobin 16.0 12.0 - 16.0 g/dL    Hematocrit 44.1 37.0 - 47.0 %    MCV 92.5 81.4 - 97.8 fL    MCH 33.5 (H) 27.0 - 33.0 pg    MCHC 36.3 (H) 33.6 - 35.0 g/dL    RDW 42.5 35.9 - 50.0 fL    Platelet Count 233 164 - 446 K/uL    MPV 10.7 9.0 - 12.9 fL    Neutrophils-Polys 86.40 (H) 44.00 - 72.00 %    Lymphocytes 6.60 (L) 22.00 - 41.00 %    Monocytes 6.20 0.00 - 13.40 %    Eosinophils 0.10 0.00 - 6.90 %    Basophils 0.20 0.00 - 1.80 %    Immature Granulocytes 0.50 0.00 - 0.90 %    Nucleated RBC 0.00 /100 WBC    Neutrophils (Absolute) 16.01 (H) 2.00 - 7.15 K/uL    Lymphs (Absolute) 1.23 1.00 - 4.80 K/uL    Monos (Absolute) 1.15 (H) 0.00 - 0.85 K/uL    Eos (Absolute) 0.01 0.00 - 0.51 K/uL    Baso (Absolute) 0.03 0.00 - 0.12 K/uL    Immature Granulocytes (abs) 0.09 0.00 - 0.11 K/uL    NRBC (Absolute) 0.00 K/uL   COMP METABOLIC PANEL    Collection Time: 11/09/17  3:37 AM   Result Value Ref Range    Sodium 133 (L) 135 - 145 mmol/L    Potassium 3.3 (L) 3.6 - 5.5 mmol/L    Chloride 96 96 - 112 mmol/L    Co2 23 20 - 33 mmol/L    Anion Gap 14.0 (H) 0.0 - 11.9    Glucose 140 (H) 65 - 99 mg/dL    Bun 26 (H) 8 - 22 mg/dL    Creatinine 0.66 0.50 - 1.40 mg/dL    Calcium 9.5 8.5 - 10.5 mg/dL    AST(SGOT) 17 12 - 45 U/L    ALT(SGPT) 13 2 - 50 U/L    Alkaline Phosphatase 91 30 - 99 U/L    Total Bilirubin 1.0 0.1 - 1.5 mg/dL    Albumin  4.8 3.2 - 4.9 g/dL    Total Protein 7.9 6.0 - 8.2 g/dL    Globulin 3.1 1.9 - 3.5 g/dL    A-G Ratio 1.5 g/dL   BETA-HCG QUALITATIVE SERUM    Collection Time: 17  3:37 AM   Result Value Ref Range    Beta-Hcg Qualitative Serum Negative Negative   MAGNESIUM    Collection Time: 17  3:37 AM   Result Value Ref Range    Magnesium 1.8 1.5 - 2.5 mg/dL   ESTIMATED GFR    Collection Time: 17  3:37 AM   Result Value Ref Range    GFR If African American >60 >60 mL/min/1.73 m 2    GFR If Non African American >60 >60 mL/min/1.73 m 2   EKG (ER)    Collection Time: 17  4:00 AM   Result Value Ref Range    Report       [facility] Emergency Dept.    Test Date:  2017  Pt Name:    LAZARO PEÑALOZA                 Department: ER  MRN:        [id number]                      Room:       [facility]  Gender:     F                            Technician: [id number]  :        1990                   Requested By:BRANDAN STEVE  Order #:    [id number]                    Reading MD:    Measurements  Intervals                                Axis  Rate:       88                           P:          63  CT:         144                          QRS:        88  QRSD:       96                           T:          31  QT:         356  QTc:        431    Interpretive Statements  SINUS RHYTHM  Compared to ECG 2017 07:34:55  Right-axis deviation no longer present  T-wave abnormality no longer present         Imaging/Procedures Review:    ndependant Imaging Review: Completed  CT-HEAD W/O    (Results Pending)        EKG:   EKG Independant Review: Completed  QTc: 431 , HR: 88 Normal Sinus Rhythm, no ST/T changes , had one Vtach episode on the cardiac monitor    (y) Records reviewed and summarized in current documentation             Assessment/Plan     # Seizure vs pseudoseizure   - 2 Episodes seizure in the ED,  Lasting less than one min with no confusion after the episode   - polysubstance abuse   - ED:  received 4 mg lorazepam, did not start her on keppra, pending neurology eval   - PRN 2 mg Ativan for breakthrough seizures  -  No Hypocalcemia, hypoglycemia or acid base disturbance that could cause seizure.  - mild hyponatremia   - elevated WBC 18, afebrile, no neck stiffness   - Urine Drug Screen was sent   - Seizure precaution, fall and aspiration precaution.  - pregnancy test  negative  - neurology consult  - continuous video EEG:  - CT ordered, morning team to consider MRI with contrast if indicated   - prolactin pending   - AEDs: I did not start the patient on Keppra with the history of less likely seizure episode   pending neuro recommendation   - Patient will advice not to drive upon discharge     # Mild hyponatremia   On IV fluid     # Hypokalemia    - Serum potassium was 3.3  - Replete     # VTACH ON TELE during the seizure episode    - On the cardiac monitor device, no ECG to confirm, questionable movement artifact, looks Vtcah on monitor   - Status post cardioversion  - will wait cardiology recommendation   - received 2 gram mag and correction of potassium with 40 meq of potassium by ER physician   - on amiodarone started in the ED   - no ondansetron for nausea     # chronic pain issues  Will resume home meds Norco    # abdominal pain  continue PPI and sucralfate         Anticipated Hospital stay:  >2 midnights        Quality Measures    Reviewed items::  EKG reviewed, Labs reviewed, Medications reviewed and Radiology images reviewed  Barrett catheter::  No Barrett  DVT prophylaxis pharmacological::  Heparin  DVT prophylaxis - mechanical:  SCDs  Ulcer Prophylaxis::  Yes

## 2017-11-10 NOTE — DISCHARGE SUMMARY
Patient left  Against Medical Advice   Note Author: Ena Morrison M.D.       Admit Date:  11/9/2017       Discharge Date: 11/9/2017    Service:   R Internal Medicine [facility] Team  Attending Physician(s):   Dr Agudelo  Senior Resident(s):   Elvia   Bruce Resident(s):   Giovanny       Primary Diagnosis:   Seizures     Secondary Diagnoses:                Principal Problem:    Seizure (CMS-HCC) POA: Unknown  Active Problems:    Abdominal pain POA: Unknown    Hypokalemia POA: Yes    Abnormal heart rhythm POA: Unknown    Hyponatremia POA: Unknown  Resolved Problems:    * No resolved hospital problems. *      Hospital Summary (Brief Narrative):  Patient left  Against Medical Advice (AMA)    Rupa Yang is a 26y/o female with PMHx of multiple seizure activities and abdominal pain. Patient has been evaluated in the past for seizure activity and the conclusion was pseudoseizures due to lack of seizure activity with EEG video, neurology evaluation. Patient presented at the ED with abdominal pain and 2 episodes of seizures since last discharge 11/7/17. Patient reported that she has been having this episodes for years but is not using any medication. In the ED patient had one episode of seizure and received 2mg lorazepam. Later she had a second episode and during it the cardiac monitor showed Vtach, patient received  2mg lorazepam and cardioversion with shock, and amiodarone was started. Patient found with magnesium 1.8 and received 2 gram, potassium of 3.3 and received 40mEq. Cardiology and Neurology were consulted.     Patient admitted to the ICU for further monitoring. No additional episodes of VTach were noted on cardiac monitor. Cardiology tawny;luated the patient and no indication to continue the amiodarone drip, so it was discontinue. An ECHO was done as recommended by Cardiology to evaluate LV function. The ECHO was done before the patient left AMA. Results were not discuss with the patient,  because they were not available at the time patient left. ECHO showed EF 60% with normal left ventricle. Neurology evaluated the patient and recommended EEG for further evaluation. Head Ct was negative for acute intracranial abnormality. Patient refuse EEG test, insisting that this test has been done in the past and did not showed anything.  Patient decided to leave AMA.     Patient /Hospital Summary (Details -- Problem Oriented) :          Abnormal heart rhythm   Assessment & Plan    # VTACH ON TELE during the seizure episode    In the cardiac monitor showed VTach while patient was having an episode of seizure, no ECG to confirm. Status post cardioversion  Also patient found to have low magnesium and potassium and received 2 gram mag and correction of potassium with 40 meq of potassium by ER physician. Cardiology consulted. They evaluated the patient and no indication to continue the amlodipine drip, it was discontinue. They recommended and ECHO for further evaluation of the Left ventricle function. ECHO was done and showed EF 60% with normal left ventricle.   Patient left AMA before results could be discussed.                    * Seizure (CMS-HCC)   Assessment & Plan    Patient had 2 Episodes seizure in the ED,  Lasting less than one min with no confusion after the episode. Patient received 2mg lorazepam on both occasions. patient did not had Hypocalcemia, hypoglycemia or acid base disturbance that could cause seizure. A Head CT done and negative for acute intracranial abnormality. Neurology consulted and evaluated the patient. Recommended EEG for further evaluation. Patient refuse the EEG test.   Patient left AMA              Hyponatremia   Assessment & Plan    Patient was on IV fluids         Hypokalemia   Assessment & Plan    Patient found with hypokalemia and correction done with 40mEq.           Abdominal pain   Assessment & Plan    Started  PPI and sucralfate.             Consultants:     Cardiology    Neurology   PMN    Procedures:        None     Imaging/ Testing:      Head CT   ECHO     Discharge Medications:         Medication Reconciliation: Deferred Patient left AMA        Medication List      ASK your doctor about these medications      Instructions   cyclobenzaprine 10 MG Tabs  Commonly known as:  FLEXERIL   Take 1 Tab by mouth 3 times a day as needed for Muscle Spasms.  Dose:  10 mg     * hydrocodone-acetaminophen 5-325 MG Tabs per tablet  Commonly known as:  NORCO   Take 1-2 Tabs by mouth every four hours as needed (mild pain).  Dose:  1-2 Tab     * hydrocodone-acetaminophen 5-325 MG Tabs per tablet  Commonly known as:  NORCO   Take 1-2 Tabs by mouth every four hours as needed (mild pain).  Dose:  1-2 Tab     MIDOL MAX ST MENSTRUAL FORMULA PO   Take 1 Tab by mouth as needed.  Dose:  1 Tab     omeprazole 20 MG delayed-release capsule  Commonly known as:  PRILOSEC   Take 1 Cap by mouth every day.  Dose:  20 mg     ondansetron 4 MG Tbdp  Commonly known as:  ZOFRAN ODT   Take 1 Tab by mouth every 6 hours as needed for Nausea.  Dose:  4 mg     sucralfate 1 GM Tabs  Commonly known as:  CARAFATE   Take 1 Tab by mouth 4 Times a Day,Before Meals and at Bedtime.  Dose:  1 g        * This list has 2 medication(s) that are the same as other medications prescribed for you. Read the directions carefully, and ask your doctor or other care provider to review them with you.                Can use .DISCHARGEMEDSLIST if going to another facility         Disposition:   Patient left AMA     Diet:   Regular     Activity:   As tolerated     Instructions:        The patient was instructed to return to the ER in the event of worsening symptoms. I have counseled the patient on the importance of compliance and the patient has agreed to proceed with all medical recommendations and follow up plan indicated above.   The patient understands that all medications come with benefits and risks. Risks may include permanent injury or  death and these risks can be minimized with close reassessment and monitoring.        Primary Care Provider:      Discharge summary faxed to primary care provider:  Deferred  Copy of discharge summary given to the patient: Deferred  Patient left AMA     Follow up appointment details :      None     Pending Studies:            Time spent on discharge day patient visit, preparing discharge paperwork and arranging for patient follow up.      Discharge Time (Minutes) :      Hospital Course Type: Patient left AMA       Condition on Discharge    Stable   ______________________________________________________________________    Interval history/exam for day of discharge:     Patient evaluated at bedside and was somnolent. Complaining of abdominal pain. No chest pain, no palpitations, no nausea or vomiting. Patient had another episode of seizure in the morning and when being evaluated by Cardiology. Patient not confuse after the events.   Cardiology evaluated patient and discontinue the amiodarone drip. ECHo was ordered and done.   Neurology evaluated the patient and recommended and EEG for further evaluation. Patient refuse the EEG test.   Patient left AMA     Vitals:    11/09/17 1300 11/09/17 1400 11/09/17 1500 11/09/17 1600   BP:       Pulse:  93 92 78   Resp: 17 (!) 187 (!) 55 (!) 35   Temp:       SpO2:  97% 99% 99%   Weight:       Height:         Weight/BMI: Body mass index is 21.46 kg/m².  Pulse Oximetry: 99 %    General: alert, AOx3, in no distress   CVS: RRR, NS1S2, no murmur   PULM: clear to auscultation, no wheezing   ABDOMEN: soft, bowel sounds present, no tenderness upon palpation.   Neuro: She is alert and oriented to person, place, and time. She has normal strength, normal reflexes and intact cranial nerves. She displays no weakness and facial symmetry. No cranial nerve deficit.     Most Recent Labs:    Lab Results   Component Value Date/Time    WBC 18.5 (H) 11/09/2017 03:37 AM    RBC 4.77 11/09/2017 03:37 AM     HEMOGLOBIN 16.0 11/09/2017 03:37 AM    HEMATOCRIT 44.1 11/09/2017 03:37 AM    MCV 92.5 11/09/2017 03:37 AM    MCH 33.5 (H) 11/09/2017 03:37 AM    MCHC 36.3 (H) 11/09/2017 03:37 AM    MPV 10.7 11/09/2017 03:37 AM    NEUTSPOLYS 86.40 (H) 11/09/2017 03:37 AM    LYMPHOCYTES 6.60 (L) 11/09/2017 03:37 AM    MONOCYTES 6.20 11/09/2017 03:37 AM    EOSINOPHILS 0.10 11/09/2017 03:37 AM    BASOPHILS 0.20 11/09/2017 03:37 AM    HYPOCHROMIA 1+ 01/28/2013 09:13 AM    ANISOCYTOSIS 1+ 05/13/2013 10:21 AM      Lab Results   Component Value Date/Time    SODIUM 133 (L) 11/09/2017 03:37 AM    POTASSIUM 3.3 (L) 11/09/2017 03:37 AM    CHLORIDE 96 11/09/2017 03:37 AM    CO2 23 11/09/2017 03:37 AM    GLUCOSE 140 (H) 11/09/2017 03:37 AM    BUN 26 (H) 11/09/2017 03:37 AM    CREATININE 0.66 11/09/2017 03:37 AM      Lab Results   Component Value Date/Time    ALTSGPT 13 11/09/2017 03:37 AM    ASTSGOT 17 11/09/2017 03:37 AM    ALKPHOSPHAT 91 11/09/2017 03:37 AM    TBILIRUBIN 1.0 11/09/2017 03:37 AM    LIPASE 5 (L) 08/08/2017 09:30 AM    ALBUMIN 4.8 11/09/2017 03:37 AM    GLOBULIN 3.1 11/09/2017 03:37 AM    INR 1.01 10/02/2017 03:30 PM     Lab Results   Component Value Date/Time    PROTHROMBTM 13.6 10/02/2017 03:30 PM    INR 1.01 10/02/2017 03:30 PM

## 2017-11-10 NOTE — PROGRESS NOTES
Around 1600 patient states that she would like to talk to the nurse, on arrival to her room patients states that she would like to leave AMA.  I asked her if she would be ok talking to Dr Agudelo about leaving AMA and she agrees to talk to him.  During conversation with MD patient starts to become very agitated and starts ripping off her gown, BP cuff, and heart electrodes.  Dr Agudelo states he is ok with her leaving AMA and to inform Neuro and Cardio Mds following.  I was able to get ahold of Dr gallegos with neurology to let him know of patient AMA status and he is ok with her leaving.  Still waiting for cardiac doctors to return phone call even after patient has already left AMA.  Prior to patient leaving AMA I was able to calm her down enough to remove both Iv's.  Patient dressed herself, used the restroom on the unit before walking out under her own power showing no signs of distress.  Patient states she parked in ER  and will find her own way back to ER [facility].  Patient walks off unit around 1620.

## 2017-11-11 LAB
BACTERIA UR CULT: NORMAL
SIGNIFICANT IND 70042: NORMAL
SITE SITE: NORMAL
SOURCE SOURCE: NORMAL

## 2017-12-27 ENCOUNTER — HOSPITAL ENCOUNTER (EMERGENCY)
Facility: MEDICAL CENTER | Age: 27
End: 2017-12-27
Attending: EMERGENCY MEDICINE
Payer: MEDICAID

## 2017-12-27 VITALS
OXYGEN SATURATION: 97 % | BODY MASS INDEX: 19.95 KG/M2 | HEART RATE: 105 BPM | DIASTOLIC BLOOD PRESSURE: 68 MMHG | WEIGHT: 116.84 LBS | HEIGHT: 64 IN | RESPIRATION RATE: 18 BRPM | SYSTOLIC BLOOD PRESSURE: 116 MMHG | TEMPERATURE: 98.1 F

## 2017-12-27 DIAGNOSIS — R11.2 NON-INTRACTABLE VOMITING WITH NAUSEA, UNSPECIFIED VOMITING TYPE: ICD-10-CM

## 2017-12-27 DIAGNOSIS — R11.15 NON-INTRACTABLE CYCLICAL VOMITING WITH NAUSEA: ICD-10-CM

## 2017-12-27 DIAGNOSIS — R10.84 GENERALIZED ABDOMINAL PAIN: ICD-10-CM

## 2017-12-27 DIAGNOSIS — F44.5 PSEUDOSEIZURE: ICD-10-CM

## 2017-12-27 LAB
ALBUMIN SERPL BCP-MCNC: 4.7 G/DL (ref 3.2–4.9)
ALBUMIN/GLOB SERPL: 1.6 G/DL
ALP SERPL-CCNC: 99 U/L (ref 30–99)
ALT SERPL-CCNC: 16 U/L (ref 2–50)
ANION GAP SERPL CALC-SCNC: 11 MMOL/L (ref 0–11.9)
APPEARANCE UR: ABNORMAL
APPEARANCE UR: ABNORMAL
AST SERPL-CCNC: 18 U/L (ref 12–45)
BACTERIA #/AREA URNS HPF: NEGATIVE /HPF
BASOPHILS # BLD AUTO: 0.2 % (ref 0–1.8)
BASOPHILS # BLD: 0.03 K/UL (ref 0–0.12)
BILIRUB SERPL-MCNC: 0.7 MG/DL (ref 0.1–1.5)
BILIRUB UR QL STRIP.AUTO: ABNORMAL
BUN SERPL-MCNC: 25 MG/DL (ref 8–22)
CALCIUM SERPL-MCNC: 10 MG/DL (ref 8.5–10.5)
CHLORIDE SERPL-SCNC: 108 MMOL/L (ref 96–112)
CO2 SERPL-SCNC: 21 MMOL/L (ref 20–33)
COLOR UR AUTO: ABNORMAL
COLOR UR: ABNORMAL
CREAT SERPL-MCNC: 0.65 MG/DL (ref 0.5–1.4)
CULTURE IF INDICATED INDCX: YES UA CULTURE
EKG IMPRESSION: NORMAL
EOSINOPHIL # BLD AUTO: 0 K/UL (ref 0–0.51)
EOSINOPHIL NFR BLD: 0 % (ref 0–6.9)
EPI CELLS #/AREA URNS HPF: ABNORMAL /HPF
ERYTHROCYTE [DISTWIDTH] IN BLOOD BY AUTOMATED COUNT: 43.7 FL (ref 35.9–50)
GFR SERPL CREATININE-BSD FRML MDRD: >60 ML/MIN/1.73 M 2
GLOBULIN SER CALC-MCNC: 3 G/DL (ref 1.9–3.5)
GLUCOSE SERPL-MCNC: 96 MG/DL (ref 65–99)
GLUCOSE UR QL STRIP.AUTO: NEGATIVE MG/DL
GLUCOSE UR STRIP.AUTO-MCNC: NEGATIVE MG/DL
HCG UR QL: NEGATIVE
HCT VFR BLD AUTO: 40.1 % (ref 37–47)
HGB BLD-MCNC: 14.2 G/DL (ref 12–16)
HYALINE CASTS #/AREA URNS LPF: ABNORMAL /LPF
IMM GRANULOCYTES # BLD AUTO: 0.05 K/UL (ref 0–0.11)
IMM GRANULOCYTES NFR BLD AUTO: 0.4 % (ref 0–0.9)
KETONES UR QL STRIP.AUTO: 15 MG/DL
KETONES UR STRIP.AUTO-MCNC: ABNORMAL MG/DL
LEUKOCYTE ESTERASE UR QL STRIP.AUTO: ABNORMAL
LEUKOCYTE ESTERASE UR QL STRIP.AUTO: NEGATIVE
LYMPHOCYTES # BLD AUTO: 0.68 K/UL (ref 1–4.8)
LYMPHOCYTES NFR BLD: 5.4 % (ref 22–41)
MAGNESIUM SERPL-MCNC: 1.9 MG/DL (ref 1.5–2.5)
MCH RBC QN AUTO: 33 PG (ref 27–33)
MCHC RBC AUTO-ENTMCNC: 35.4 G/DL (ref 33.6–35)
MCV RBC AUTO: 93.3 FL (ref 81.4–97.8)
MICRO URNS: ABNORMAL
MONOCYTES # BLD AUTO: 0.68 K/UL (ref 0–0.85)
MONOCYTES NFR BLD AUTO: 5.4 % (ref 0–13.4)
MUCOUS THREADS #/AREA URNS HPF: ABNORMAL /HPF
NEUTROPHILS # BLD AUTO: 11.25 K/UL (ref 2–7.15)
NEUTROPHILS NFR BLD: 88.6 % (ref 44–72)
NITRITE UR QL STRIP.AUTO: NEGATIVE
NITRITE UR QL STRIP.AUTO: NEGATIVE
NRBC # BLD AUTO: 0 K/UL
NRBC BLD-RTO: 0 /100 WBC
PH UR STRIP.AUTO: 5 [PH]
PH UR STRIP.AUTO: 5.5 [PH]
PLATELET # BLD AUTO: 203 K/UL (ref 164–446)
PMV BLD AUTO: 11.5 FL (ref 9–12.9)
POTASSIUM SERPL-SCNC: 4 MMOL/L (ref 3.6–5.5)
PROT SERPL-MCNC: 7.7 G/DL (ref 6–8.2)
PROT UR QL STRIP: 100 MG/DL
PROT UR QL STRIP: 30 MG/DL
RBC # BLD AUTO: 4.3 M/UL (ref 4.2–5.4)
RBC # URNS HPF: ABNORMAL /HPF
RBC UR QL AUTO: ABNORMAL
RBC UR QL AUTO: ABNORMAL
SODIUM SERPL-SCNC: 140 MMOL/L (ref 135–145)
SP GR UR STRIP.AUTO: 1.02
SP GR UR: >=1.03
UROBILINOGEN UR STRIP.AUTO-MCNC: 1 MG/DL
WBC # BLD AUTO: 12.7 K/UL (ref 4.8–10.8)
WBC #/AREA URNS HPF: ABNORMAL /HPF

## 2017-12-27 PROCEDURE — 81025 URINE PREGNANCY TEST: CPT

## 2017-12-27 PROCEDURE — 87086 URINE CULTURE/COLONY COUNT: CPT

## 2017-12-27 PROCEDURE — 83735 ASSAY OF MAGNESIUM: CPT

## 2017-12-27 PROCEDURE — 85025 COMPLETE CBC W/AUTO DIFF WBC: CPT

## 2017-12-27 PROCEDURE — 93005 ELECTROCARDIOGRAM TRACING: CPT

## 2017-12-27 PROCEDURE — 99285 EMERGENCY DEPT VISIT HI MDM: CPT

## 2017-12-27 PROCEDURE — 700111 HCHG RX REV CODE 636 W/ 250 OVERRIDE (IP): Performed by: EMERGENCY MEDICINE

## 2017-12-27 PROCEDURE — 96374 THER/PROPH/DIAG INJ IV PUSH: CPT

## 2017-12-27 PROCEDURE — 81002 URINALYSIS NONAUTO W/O SCOPE: CPT

## 2017-12-27 PROCEDURE — 81001 URINALYSIS AUTO W/SCOPE: CPT

## 2017-12-27 PROCEDURE — 700105 HCHG RX REV CODE 258: Performed by: EMERGENCY MEDICINE

## 2017-12-27 PROCEDURE — 80053 COMPREHEN METABOLIC PANEL: CPT

## 2017-12-27 PROCEDURE — 96361 HYDRATE IV INFUSION ADD-ON: CPT

## 2017-12-27 PROCEDURE — 93005 ELECTROCARDIOGRAM TRACING: CPT | Performed by: EMERGENCY MEDICINE

## 2017-12-27 PROCEDURE — 96375 TX/PRO/DX INJ NEW DRUG ADDON: CPT

## 2017-12-27 RX ORDER — KETAMINE HYDROCHLORIDE 50 MG/ML
25 INJECTION, SOLUTION INTRAMUSCULAR; INTRAVENOUS ONCE
Status: DISCONTINUED | OUTPATIENT
Start: 2017-12-27 | End: 2017-12-27 | Stop reason: HOSPADM

## 2017-12-27 RX ORDER — HALOPERIDOL 5 MG/ML
5 INJECTION INTRAMUSCULAR ONCE
Status: COMPLETED | OUTPATIENT
Start: 2017-12-27 | End: 2017-12-27

## 2017-12-27 RX ORDER — ONDANSETRON 4 MG/1
4 TABLET, ORALLY DISINTEGRATING ORAL EVERY 8 HOURS PRN
Qty: 10 TAB | Refills: 0 | Status: SHIPPED | OUTPATIENT
Start: 2017-12-27 | End: 2018-03-05

## 2017-12-27 RX ORDER — ONDANSETRON 2 MG/ML
4 INJECTION INTRAMUSCULAR; INTRAVENOUS
Status: DISCONTINUED | OUTPATIENT
Start: 2017-12-27 | End: 2017-12-27 | Stop reason: HOSPADM

## 2017-12-27 RX ORDER — SODIUM CHLORIDE 9 MG/ML
1000 INJECTION, SOLUTION INTRAVENOUS ONCE
Status: COMPLETED | OUTPATIENT
Start: 2017-12-27 | End: 2017-12-27

## 2017-12-27 RX ORDER — LORAZEPAM 2 MG/ML
1 INJECTION INTRAMUSCULAR ONCE
Status: DISCONTINUED | OUTPATIENT
Start: 2017-12-27 | End: 2017-12-27 | Stop reason: HOSPADM

## 2017-12-27 RX ADMIN — HALOPERIDOL LACTATE 5 MG: 5 INJECTION, SOLUTION INTRAMUSCULAR at 10:03

## 2017-12-27 RX ADMIN — SODIUM CHLORIDE 1000 ML: 9 INJECTION, SOLUTION INTRAVENOUS at 09:57

## 2017-12-27 RX ADMIN — ONDANSETRON 4 MG: 2 INJECTION INTRAMUSCULAR; INTRAVENOUS at 10:03

## 2017-12-27 ASSESSMENT — LIFESTYLE VARIABLES: DO YOU DRINK ALCOHOL: NO

## 2017-12-27 ASSESSMENT — PAIN SCALES - GENERAL: PAINLEVEL_OUTOF10: 10

## 2017-12-27 NOTE — DISCHARGE INSTRUCTIONS
Abdominal Pain, Women  Continue to abstain from all marijuana and drugs.  Take zofran for nausea and vomiting.  Follow up with Gyn and GI.    Abdominal (stomach, pelvic, or belly) pain can be caused by many things. It is important to tell your doctor:  · The location of the pain.  · Does it come and go or is it present all the time?  · Are there things that start the pain (eating certain foods, exercise)?  · Are there other symptoms associated with the pain (fever, nausea, vomiting, diarrhea)?  All of this is helpful to know when trying to find the cause of the pain.  CAUSES   · Stomach: virus or bacteria infection, or ulcer.  · Intestine: appendicitis (inflamed appendix), regional ileitis (Crohn's disease), ulcerative colitis (inflamed colon), irritable bowel syndrome, diverticulitis (inflamed diverticulum of the colon), or cancer of the stomach or intestine.  · Gallbladder disease or stones in the gallbladder.  · Kidney disease, kidney stones, or infection.  · Pancreas infection or cancer.  · Fibromyalgia (pain disorder).  · Diseases of the female organs:  · Uterus: fibroid (non-cancerous) tumors or infection.  · Fallopian tubes: infection or tubal pregnancy.  · Ovary: cysts or tumors.  · Pelvic adhesions (scar tissue).  · Endometriosis (uterus lining tissue growing in the pelvis and on the pelvic organs).  · Pelvic congestion syndrome (female organs filling up with blood just before the menstrual period).  · Pain with the menstrual period.  · Pain with ovulation (producing an egg).  · Pain with an IUD (intrauterine device, birth control) in the uterus.  · Cancer of the female organs.  · Functional pain (pain not caused by a disease, may improve without treatment).  · Psychological pain.  · Depression.  DIAGNOSIS   Your doctor will decide the seriousness of your pain by doing an examination.  · Blood tests.  · X-rays.  · Ultrasound.  · CT scan (computed tomography, special type of X-ray).  · MRI (magnetic  resonance imaging).  · Cultures, for infection.  · Barium enema (dye inserted in the large intestine, to better view it with X-rays).  · Colonoscopy (looking in intestine with a lighted tube).  · Laparoscopy (minor surgery, looking in abdomen with a lighted tube).  · Major abdominal exploratory surgery (looking in abdomen with a large incision).  TREATMENT   The treatment will depend on the cause of the pain.   · Many cases can be observed and treated at home.  · Over-the-counter medicines recommended by your caregiver.  · Prescription medicine.  · Antibiotics, for infection.  · Birth control pills, for painful periods or for ovulation pain.  · Hormone treatment, for endometriosis.  · Nerve blocking injections.  · Physical therapy.  · Antidepressants.  · Counseling with a psychologist or psychiatrist.  · Minor or major surgery.  HOME CARE INSTRUCTIONS   · Do not take laxatives, unless directed by your caregiver.  · Take over-the-counter pain medicine only if ordered by your caregiver. Do not take aspirin because it can cause an upset stomach or bleeding.  · Try a clear liquid diet (broth or water) as ordered by your caregiver. Slowly move to a bland diet, as tolerated, if the pain is related to the stomach or intestine.  · Have a thermometer and take your temperature several times a day, and record it.  · Bed rest and sleep, if it helps the pain.  · Avoid sexual intercourse, if it causes pain.  · Avoid stressful situations.  · Keep your follow-up appointments and tests, as your caregiver orders.  · If the pain does not go away with medicine or surgery, you may try:  · Acupuncture.  · Relaxation exercises (yoga, meditation).  · Group therapy.  · Counseling.  SEEK MEDICAL CARE IF:   · You notice certain foods cause stomach pain.  · Your home care treatment is not helping your pain.  · You need stronger pain medicine.  · You want your IUD removed.  · You feel faint or lightheaded.  · You develop nausea and  vomiting.  · You develop a rash.  · You are having side effects or an allergy to your medicine.  SEEK IMMEDIATE MEDICAL CARE IF:   · Your pain does not go away or gets worse.  · You have a fever.  · Your pain is felt only in portions of the abdomen. The right side could possibly be appendicitis. The left lower portion of the abdomen could be colitis or diverticulitis.  · You are passing blood in your stools (bright red or black tarry stools, with or without vomiting).  · You have blood in your urine.  · You develop chills, with or without a fever.  · You pass out.  MAKE SURE YOU:   · Understand these instructions.  · Will watch your condition.  · Will get help right away if you are not doing well or get worse.  Document Released: 10/14/2008 Document Revised: 03/11/2013 Document Reviewed: 11/04/2010  [employer]® Patient Information ©2014 [employer].

## 2017-12-27 NOTE — ED NOTES
Discharge instructions given to Pt. 2 pairs of socks given. She ambulated out of the ER with no assistance needed. Friend here to pick her up.

## 2017-12-27 NOTE — ED NOTES
Seizure precautions placed. Pt placed close to the nurses' station. Curtains left open. Pt in view of staff.

## 2017-12-27 NOTE — ED NOTES
"EMS sts, Pt had 10 seizures at her friend's house. She's had 2 seizures with EMS, negative post-ictal. Pt has been diagnosed with pseudoseizures. 3 \"seizures\" witnessed in the ER lasting 10-15 seconds (generalized convulsions). Negative post-ictal.  "

## 2017-12-27 NOTE — ED PROVIDER NOTES
"ED Provider Note    Scribed for Satnam Pope M.D. by Deangelo Patel. 2017  8:56 AM    Primary care provider: Pcp Not In Computer  Means of arrival: EMS  History obtained from: patient  History limited by: none    CHIEF COMPLAINT  Chief Complaint   Patient presents with   • Seizure   • Abdominal Pain   • N/V       HPI  Rupa Yang is a 27 y.o. female who presents to the Emergency Department for evaluation and complaining of constant diffuse abdominal pain for the last 13 hours. Patient describes her pain as 10/10 severity that has not improved with ibuprofen administration.. She reports a chronic history of seizures and states that when her pain climaxes, then she has a seizure, which improves her pain. Patient has a history of cholecystectomy that improved her chronic abdominal pain transiently. She states that her gyn currently believes that endometriosis is causing her pain.  She is not currently on oral contraceptives and has an appointment with a specialist pending. Patient reports associated nausea, vomiting, diarrhea. She reports 20 episodes of vomiting and 3 episodes of diarrhea in the last 24 hours. Patient is . She denies fever, marijuana use but was a heavy user until 2 months ago when she stopped when she was told she had cyclic vomiting probably due to marijuana.     REVIEW OF SYSTEMS  Pertinent positives include: seizure, nausea, vomiting, diarrhea.  Pertinent negatives include: fever.  10+ systems reviewed and negative.    C.    PAST MEDICAL HISTORY  Past Medical History:   Diagnosis Date   • Arrhythmia     \"it feels like palpitations\"   • Dental disorder     \"my teeth are completely dead. Exposed nerves\"   • Eczema     arms, neck, mild currently   • Gastroenteritis    • Heart burn    • Indigestion    • Other specified disorder of intestines     diarrhea   • Pain 02-24-15    chronic back, abd., 2/10   • Scoliosis     dx age 10, chirpractor told severe curvature, left leg shorter.  " "  • Substance abuse     meth, coke, heroin, marijuana stopped after parents OD when she age 12.        FAMILY HISTORY  Family History   Problem Relation Age of Onset   • Alcohol/Drug Mother      drugs   • Diabetes Mother      NIDDM   • Alcohol/Drug Father      drugs   • Alcohol/Drug Brother    • Hypertension Maternal Aunt    • Diabetes Maternal Aunt      IDDM   • Diabetes Maternal Grandmother      IDDM   • Diabetes Maternal Grandfather      IDDM   • Cancer Paternal Grandmother      breast   • Hypertension Paternal Grandmother        SOCIAL HISTORY  Social History   Substance Use Topics   • Smoking status: Current Every Day Smoker     Packs/day: 0.50     Types: Cigarettes   • Smokeless tobacco: Never Used   • Alcohol use No     History   Drug use: Unknown     Comment: in past meth, cocaine, weed, states last used \"a long time ago\"       SURGICAL HISTORY  Past Surgical History:   Procedure Laterality Date   • JUDY BY LAPAROSCOPY  3/3/2015    Procedure: JUDY BY LAPAROSCOPY;  Surgeon: Aftab Churchill M.D.;  Location: SURGERY [facility];  Service:    • PRIMARY C SECTION  11/27/2013    Performed by Haider Garcia M.D. at LABOR AND DELIVERY       CURRENT MEDICATIONS  Current Outpatient Prescriptions:   •  ondansetron (ZOFRAN ODT) 4 MG TABLET DISPERSIBLE, Take 1 Tab by mouth every 6 hours as needed for Nausea., Disp: 10 Tab, Rfl: 0  •  hydrocodone-acetaminophen (NORCO) 5-325 MG Tab per tablet, Take 1-2 Tabs by mouth every four hours as needed (mild pain)., Disp: 10 Tab, Rfl: 0  •  hydrocodone-acetaminophen (NORCO) 5-325 MG Tab per tablet, Take 1-2 Tabs by mouth every four hours as needed (mild pain)., Disp: 10 Tab, Rfl: 0  •  cyclobenzaprine (FLEXERIL) 10 MG Tab, Take 1 Tab by mouth 3 times a day as needed for Muscle Spasms., Disp: 20 Tab, Rfl: 0  •  omeprazole (PRILOSEC) 20 MG delayed-release capsule, Take 1 Cap by mouth every day., Disp: 30 Cap, Rfl: 11  •  sucralfate (CARAFATE) 1 GM Tab, Take 1 Tab by mouth 4 Times " "a Day,Before Meals and at Bedtime., Disp: 44 Tab, Rfl: 4  •  Acetaminophen-Caff-Pyrilamine (MIDOL MAX ST MENSTRUAL FORMULA PO), Take 1 Tab by mouth as needed., Disp: , Rfl:     ALLERGIES  Allergies   Allergen Reactions   • Divalproex Sodium Hives and Rash         • Risperidone Hives and Rash         • Sertraline Hcl [Zoloft] Hives and Rash         • Tegretol [Carbamazepine] Hives and Rash             PHYSICAL EXAM  VITAL SIGNS: /68   Pulse (!) 105   Temp 36.7 °C (98.1 °F)   Resp 20   Ht 1.626 m (5' 4\")   Wt 53 kg (116 lb 13.5 oz)   SpO2 97%   BMI 20.06 kg/m²   Reviewed and tachycardic  Constitutional: Well developed, Well nourished, Severe distress. Writhing around in the bed with rhythmic contractions that lasted 15-30 seconds. Heart rate does not increase during the shaking episodes. She is not postictal afterwards. She answers questions during her generalized seizures.   HENT: Normocephalic, atraumatic, bilateral external ears normal, No exudates or erythema. Very dry mucous membranes. Erythematous posterior oropharynx   Eyes: PERRLA, conjunctiva pink, no scleral icterus.   Cardiovascular: Tachycardic rate and Regular rhythm. No murmurs, rubs or gallops.   Respiratory: Lungs clear to auscultation bilaterally. No wheezes, rales, or rhonchi.   Abdominal:  Abdomen soft, non distended. No rebound, or guarding. Diffuse abdominal tenderness, no masses, bowel sounds normal.  Skin: No erythema, no rash.   Genitourinary: No costovertebral angle tenderness.   Musculoskeletal: No edema.   Neurologic: Alert & oriented x 3, cranial nerves 2-12 intact by passive exam.  No focal deficit noted. Frequent shaking episodes especially when under direct observation.  Psychiatric: In pain, no obvious depression or anxiety    DIFFERENTIAL DIAGNOSIS:  Pseudo seizure, cyclic vomiting, cannabis hyperemesis syndrome, pregnancy, UTI, endometriosis, doubt appendicitis, doubt bowel obstruction, doubt diverticulitis drug " abuse.    EKG Interpretation:  Interpreted by me    Rhythm:  Normal sinus rhythm   Rate: 108  Axis: Right deviation  Ectopy: none  Conduction: Large P wave in lead V2  ST Segments: no acute change  T Waves: Non specific abnormalities in inferior leads  Q Waves: none  Clinical Impression: Sinus tachycardia Right atrial abnormality with non specific T wave abnormalities.     LABORATORY:  Results for orders placed or performed during the hospital encounter of 12/27/17   CBC WITH DIFFERENTIAL   Result Value Ref Range    WBC 12.7 (H) 4.8 - 10.8 K/uL    RBC 4.30 4.20 - 5.40 M/uL    Hemoglobin 14.2 12.0 - 16.0 g/dL    Hematocrit 40.1 37.0 - 47.0 %    MCV 93.3 81.4 - 97.8 fL    MCH 33.0 27.0 - 33.0 pg    MCHC 35.4 (H) 33.6 - 35.0 g/dL    RDW 43.7 35.9 - 50.0 fL    Platelet Count 203 164 - 446 K/uL    MPV 11.5 9.0 - 12.9 fL    Neutrophils-Polys 88.60 (H) 44.00 - 72.00 %    Lymphocytes 5.40 (L) 22.00 - 41.00 %    Monocytes 5.40 0.00 - 13.40 %    Eosinophils 0.00 0.00 - 6.90 %    Basophils 0.20 0.00 - 1.80 %    Immature Granulocytes 0.40 0.00 - 0.90 %    Nucleated RBC 0.00 /100 WBC    Neutrophils (Absolute) 11.25 (H) 2.00 - 7.15 K/uL    Lymphs (Absolute) 0.68 (L) 1.00 - 4.80 K/uL    Monos (Absolute) 0.68 0.00 - 0.85 K/uL    Eos (Absolute) 0.00 0.00 - 0.51 K/uL    Baso (Absolute) 0.03 0.00 - 0.12 K/uL    Immature Granulocytes (abs) 0.05 0.00 - 0.11 K/uL    NRBC (Absolute) 0.00 K/uL   COMP METABOLIC PANEL   Result Value Ref Range    Sodium 140 135 - 145 mmol/L    Potassium 4.0 3.6 - 5.5 mmol/L    Chloride 108 96 - 112 mmol/L    Co2 21 20 - 33 mmol/L    Anion Gap 11.0 0.0 - 11.9    Glucose 96 65 - 99 mg/dL    Bun 25 (H) 8 - 22 mg/dL    Creatinine 0.65 0.50 - 1.40 mg/dL    Calcium 10.0 8.5 - 10.5 mg/dL    AST(SGOT) 18 12 - 45 U/L    ALT(SGPT) 16 2 - 50 U/L    Alkaline Phosphatase 99 30 - 99 U/L    Total Bilirubin 0.7 0.1 - 1.5 mg/dL    Albumin 4.7 3.2 - 4.9 g/dL    Total Protein 7.7 6.0 - 8.2 g/dL    Globulin 3.0 1.9 - 3.5 g/dL     A-G Ratio 1.6 g/dL   MAGNESIUM   Result Value Ref Range    Magnesium 1.9 1.5 - 2.5 mg/dL   URINALYSIS,CULTURE IF INDICATED   Result Value Ref Range    Color DK Yellow     Character Cloudy (A)     Specific Gravity 1.025 <1.035    Ph 5.0 5.0 - 8.0    Glucose Negative Negative mg/dL    Ketones Trace (A) Negative mg/dL    Protein 30 (A) Negative mg/dL    Bilirubin Moderate (A) Negative    Urobilinogen, Urine 1.0 Negative    Nitrite Negative Negative    Leukocyte Esterase Trace (A) Negative    Occult Blood Small (A) Negative    Micro Urine Req Microscopic     Culture Indicated Yes UA Culture   POC UA   Result Value Ref Range    POC Color Amelia     POC Appearance Slightly Cloudy (A)     POC Glucose Negative Negative mg/dL    POC Ketones 15 (A) Negative mg/dL    POC Specific Gravity >=1.030 (A) 1.005 - 1.030    POC Blood Moderate (A) Negative    POC Urine PH 5.5 5.0 - 8.0    POC Protein 100 (A) Negative mg/dL    POC Nitrites Negative Negative    POC Leukocyte Esterase Negative Negative   POC URINE PREGNANCY   Result Value Ref Range    POC Urine Pregnancy Test Negative Negative   Lab results reviewed by me.     INTERVENTIONS:  Medications   lorazepam (ATIVAN) injection 1 mg (not administered)   NS infusion 1,000 mL (1,000 mL Intravenous New Bag 12/27/17 0916)   haloperidol lactate (HALDOL) injection 5 mg (5 mg Intravenous Given 12/27/17 1003)     Response:Patient reported no response to Haldol and states it generally does not work. 2 hours later before I was going to give her a dose of lorazepam and ketamine for anxiety and pain the patient stated she was better and ready for discharge..    ED COURSE:    8:56 AM - Patient seen and examined at bedside. Patient will be treated with NS  1000 ml for dehydration, Haldol 5 mg, Zofran 4 mg for her symptoms. Ordered POC urine pregnancy, POC UA, CBC with differential, CMP, POC urinalysis, POC urine pregnancy, Magnesium to evaluate.     Review of past medical records shows the  patient was last seen in November. WBC is always elevated. CMP is unremarkable. Tox screen was positive for opiates and marijuana. Last CT abdomen was in July and was normal. 9 CT scans this year. She was last admitted to the hospital [facility] 9th for seizure and abdominal pain. Patient has been diagnosed with pseudo seizures. She was prevously shocked while in the ED during a pseudo seizure for V tac in November. Patient left AMA that hospitalization. Past records confirm an extensive history of opiate and marijuana use with cyclic vomiting.     10:12 AM - Patient will be treated with Ativan 1 mg, Ketalar 50 mg. this was ordered but suspended after the patient improved    10:36 AM - Nurse informs that the patient is requesting to leave. Patient re-evaluated at [facility]. Patient reports feeling improved and wanting to discharge. Patient's lab and radiology results discussed. Discussed patient's condition and treatment plan. Patient will be discharged with instructions and provided with strict return precautions. Advised to follow up with her neurologist. Instructed to return to Emergency Department immediately if any new or worsening symptoms.    MEDICAL DECISION MAKING:  This patient presents with generalized abdominal pain, uncontrolled vomiting which was not witnessed here and pseudoseizures. Her white blood cell count is always elevated during episodes of abdominal pain per chart review and her white count today is lower than normal. This is likely cyclic vomiting or cannabis hyperemesis syndrome. Endometriosis is less likely. There is no UTI or pregnancy. At this point there is no indication for imaging study or pelvic exam. Patient also had shaking episodes that have been confirmed to be pseudoseizures in the past. There is a reported history of ventricular tachycardia but at this time there are no significant electrolyte deficits and there is no arrhythmia other than sinus tachycardia on EKG.    PLAN:  Discharge  Medication List as of 12/27/2017 10:43 AM      START taking these medications    Details   ondansetron (ZOFRAN ODT) 4 MG TABLET DISPERSIBLE Take 1 Tab by mouth every 8 hours as needed., Disp-10 Tab, R-0, Print Rx Paper           Continue to abstain from marijuana use.  Follow up with GI consultants.  Abdominal pain female handout given.  Return for worsening vomiting, GI bleed, fever and severe abdominal pain.     Your Physician  Varies      gynecology    [facility]  [place] [id number]  [phone]    Schedule an appointment as soon as possible for a visit        CONDITION: Stable, improved.     FINAL IMPRESSION  1. Generalized abdominal pain    2. Non-intractable vomiting with nausea, unspecified vomiting type    3. Pseudoseizure    4. Non-intractable cyclical vomiting with nausea Likely cannabis hyperemesis          Deangelo MORALES (Scribe), am scribing for, and in the presence of, Satnam Pope M.D..    Electronically signed by: Deangelo Patel (Scribe), 12/27/2017    ISatnam M.D. personally performed the services described in this documentation, as scribed by Deangelo Patel in my presence, and it is both accurate and complete.    The note accurately reflects work and decisions made by me.  Satnam Pope  12/27/2017  11:02 AM

## 2017-12-29 LAB
BACTERIA UR CULT: NORMAL
SIGNIFICANT IND 70042: NORMAL
SITE SITE: NORMAL
SOURCE SOURCE: NORMAL

## 2018-03-03 ENCOUNTER — HOSPITAL ENCOUNTER (EMERGENCY)
Facility: MEDICAL CENTER | Age: 28
End: 2018-03-04
Attending: EMERGENCY MEDICINE
Payer: MEDICAID

## 2018-03-03 DIAGNOSIS — R10.9 ABDOMINAL PAIN, UNSPECIFIED ABDOMINAL LOCATION: ICD-10-CM

## 2018-03-03 PROCEDURE — 99285 EMERGENCY DEPT VISIT HI MDM: CPT

## 2018-03-03 PROCEDURE — 36415 COLL VENOUS BLD VENIPUNCTURE: CPT

## 2018-03-03 RX ORDER — HALOPERIDOL 5 MG/ML
2.5 INJECTION INTRAMUSCULAR ONCE
Status: COMPLETED | OUTPATIENT
Start: 2018-03-04 | End: 2018-03-04

## 2018-03-04 VITALS — BODY MASS INDEX: 19.81 KG/M2 | WEIGHT: 116 LBS | HEIGHT: 64 IN | OXYGEN SATURATION: 99 % | HEART RATE: 75 BPM

## 2018-03-04 PROCEDURE — 96374 THER/PROPH/DIAG INJ IV PUSH: CPT

## 2018-03-04 PROCEDURE — 700111 HCHG RX REV CODE 636 W/ 250 OVERRIDE (IP): Performed by: EMERGENCY MEDICINE

## 2018-03-04 PROCEDURE — 96375 TX/PRO/DX INJ NEW DRUG ADDON: CPT

## 2018-03-04 RX ORDER — MORPHINE SULFATE 4 MG/ML
4 INJECTION, SOLUTION INTRAMUSCULAR; INTRAVENOUS ONCE
Status: COMPLETED | OUTPATIENT
Start: 2018-03-04 | End: 2018-03-04

## 2018-03-04 RX ADMIN — MORPHINE SULFATE 4 MG: 4 INJECTION INTRAVENOUS at 01:21

## 2018-03-04 RX ADMIN — HALOPERIDOL LACTATE 2.5 MG: 5 INJECTION, SOLUTION INTRAMUSCULAR at 00:04

## 2018-03-04 NOTE — ED NOTES
Pt medicated per MAR. Pt Given discharge instructions/ prescriptions/ home care instructions, Pt verbalized understanding of instructions given. Pt did not arrive with a shirt or jacket. Security notified they are attempting to located suitable clothes .

## 2018-03-04 NOTE — ED TRIAGE NOTES
Chief Complaint   Patient presents with   • Seizure     Pt bib ems from home. Pt was complaining o f abd pain, Pt began seizing in ambulance, was given 3mg of versed, and 4mg of zofran IV.   • Abdominal Pain

## 2018-03-04 NOTE — ED PROVIDER NOTES
"ED Provider Note    Scribed for Dr. Spencer Bearden M.D. by Nik Weber. 3/3/2018  11:47 PM    Primary care provider: No PCP noted  Means of arrival: Ambulance  History obtained from: Patient, Nursing note  History limited by: Patient's level of consciousness    CHIEF COMPLAINT  Chief Complaint   Patient presents with   • Seizure     Pt bib ems from home. Pt was complaining o f abd pain, Pt began seizing in ambulance, was given 3mg of versed, and 4mg of zofran IV.   • Abdominal Pain       HPI  Rupa Yang is a 27 y.o. female who presents to the Emergency Department with complaints of abdominal pain onset this afternoon.  Per nursing note, the patient began seizing in the ambulance when she was picked up. She was given 3 mg of Versed and 4 mg Zofran IV. She has a history of abdominal pain and pseudo seizures. Patient also has a past history of drug use, including marijuana, cocaine, and meth.     History of present illness limited secondary to patient's level of consciousness.       REVIEW OF SYSTEMS  Pertinent positives include pseudo seizure, abdominal pain. See HPI for further details.     ROS limited secondary to patient's level of consciousness.     E.       PAST MEDICAL HISTORY   has a past medical history of Arrhythmia; Dental disorder; Eczema; Gastroenteritis; Heart burn; Indigestion; Other specified disorder of intestines; Pain (02-24-15); Scoliosis; and Substance abuse.    SURGICAL HISTORY   has a past surgical history that includes primary c section (11/27/2013) and earl by laparoscopy (3/3/2015).    SOCIAL HISTORY  Social History   Substance Use Topics   • Smoking status: Current Every Day Smoker     Packs/day: 0.50     Types: Cigarettes   • Smokeless tobacco: Never Used   • Alcohol use No      History   Drug use: Unknown     Comment: in past meth, cocaine, weed, states last used \"a long time ago\"       FAMILY HISTORY  Family History   Problem Relation Age of Onset   • Alcohol/Drug Mother      " "drugs   • Diabetes Mother      NIDDM   • Alcohol/Drug Father      drugs   • Alcohol/Drug Brother    • Hypertension Maternal Aunt    • Diabetes Maternal Aunt      IDDM   • Diabetes Maternal Grandmother      IDDM   • Diabetes Maternal Grandfather      IDDM   • Cancer Paternal Grandmother      breast   • Hypertension Paternal Grandmother        CURRENT MEDICATIONS  No current facility-administered medications on file prior to encounter.      Current Outpatient Prescriptions on File Prior to Encounter   Medication Sig Dispense Refill   • ondansetron (ZOFRAN ODT) 4 MG TABLET DISPERSIBLE Take 1 Tab by mouth every 8 hours as needed. 10 Tab 0   • hydrocodone-acetaminophen (NORCO) 5-325 MG Tab per tablet Take 1-2 Tabs by mouth every four hours as needed (mild pain). 10 Tab 0   • hydrocodone-acetaminophen (NORCO) 5-325 MG Tab per tablet Take 1-2 Tabs by mouth every four hours as needed (mild pain). 10 Tab 0   • cyclobenzaprine (FLEXERIL) 10 MG Tab Take 1 Tab by mouth 3 times a day as needed for Muscle Spasms. 20 Tab 0   • omeprazole (PRILOSEC) 20 MG delayed-release capsule Take 1 Cap by mouth every day. 30 Cap 11   • sucralfate (CARAFATE) 1 GM Tab Take 1 Tab by mouth 4 Times a Day,Before Meals and at Bedtime. 44 Tab 4   • Acetaminophen-Caff-Pyrilamine (MIDOL MAX ST MENSTRUAL FORMULA PO) Take 1 Tab by mouth as needed.         ALLERGIES  Allergies   Allergen Reactions   • Divalproex Sodium Hives and Rash         • Risperidone Hives and Rash         • Sertraline Hcl [Zoloft] Hives and Rash         • Tegretol [Carbamazepine] Hives and Rash             PHYSICAL EXAM  VITAL SIGNS: Ht 1.626 m (5' 4\")   Wt 52.6 kg (116 lb)   BMI 19.91 kg/m²     Constitutional: Drowsy, Well developed.   Neck: No tenderness, Supple, No stridor.   Lymphatic: No lymphadenopathy noted.   Cardiovascular: Normal heart rate, Normal rhythm.   Thorax & Lungs: Clear to auscultation bilaterally, No respiratory distress, No wheezing, No crackles.   Abdomen: " Abdominal tenderness Soft, No masses, No pulsatile masses.   Skin: Warm, Dry, No erythema, No rash.   Extremities:, No edema No cyanosis.   Musculoskeletal: No tenderness to palpation or major deformities noted.  Intact distal pulses  Neurologic: Awake, alert. Moves all extremities spontaneously. Jerking motions, psuedo seizures, will not respond to stimulus. During this time but no postictal period eyes fluttering  Psychiatric: Odd affect.         COURSE & MEDICAL DECISION MAKING  Pertinent Labs & Imaging studies reviewed. (See chart for details)    11:47 PM - Patient seen and examined at bedside. Patient will be treated with 2.5 mg Haldol injection. The differential diagnoses include but are not limited to: pseudo seizures, cannabis hyperemesis syndrome    12:40 AM - Patient was reevaluated at bedside. Discussed the plan of care with the patient. She understands and verbalized agreement.     Decision Making: Patient been evaluated multiple times in the past with this abdominal pain which seems to be recurring problem and don't think we need to pursue further investigation at this time since this is occurred multiple times in the past with similar presentation and resolves with time. He has emergency room the patient was treated with some Haldol and morphine improvement in pain her pseudoseizure activity has stopped. Based on history this may be secondary to overuse of cannabis although she could not confirm that. Repeat exam is unremarkable benign    The patient will return for new or worsening symptoms and is stable at the time of discharge.    The patient is referred to a primary physician for blood pressure management, diabetic screening, and for all other preventative health concerns.      DISPOSITION:  Patient will be discharged home in stable condition.    FOLLOW UP:  Pcp Not In Computer          [facility]  [id number]  [place] [address]  [phone]          FINAL IMPRESSION  1. Abdominal pain,  unspecified abdominal location          Nik MORALSE (Scribe), am scribing for, and in the presence of, Spencer Bearden M.D..    Electronically signed by: Nik Weber (Laraibmary lou), 3/3/2018    Spencer MORALES M.D. personally performed the services described in this documentation, as scribed by Nik Weber in my presence, and it is both accurate and complete.    The note accurately reflects work and decisions made by me.  Spencer Bearden  3/4/2018  1:09 AM

## 2018-03-05 ENCOUNTER — HOSPITAL ENCOUNTER (EMERGENCY)
Facility: MEDICAL CENTER | Age: 28
End: 2018-03-05
Attending: EMERGENCY MEDICINE
Payer: MEDICAID

## 2018-03-05 ENCOUNTER — HOSPITAL ENCOUNTER (EMERGENCY)
Facility: MEDICAL CENTER | Age: 28
End: 2018-03-06
Attending: EMERGENCY MEDICINE
Payer: MEDICAID

## 2018-03-05 VITALS
HEART RATE: 80 BPM | HEIGHT: 65 IN | RESPIRATION RATE: 15 BRPM | BODY MASS INDEX: 20.66 KG/M2 | SYSTOLIC BLOOD PRESSURE: 100 MMHG | WEIGHT: 124 LBS | TEMPERATURE: 97.9 F | OXYGEN SATURATION: 95 % | DIASTOLIC BLOOD PRESSURE: 64 MMHG

## 2018-03-05 DIAGNOSIS — R10.13 EPIGASTRIC PAIN: ICD-10-CM

## 2018-03-05 DIAGNOSIS — E87.6 HYPOKALEMIA: ICD-10-CM

## 2018-03-05 DIAGNOSIS — E86.0 DEHYDRATION: ICD-10-CM

## 2018-03-05 LAB
ALBUMIN SERPL BCP-MCNC: 5.1 G/DL (ref 3.2–4.9)
ALBUMIN/GLOB SERPL: 1.5 G/DL
ALP SERPL-CCNC: 91 U/L (ref 30–99)
ALT SERPL-CCNC: 12 U/L (ref 2–50)
AMPHET UR QL SCN: POSITIVE
ANION GAP SERPL CALC-SCNC: 15 MMOL/L (ref 0–11.9)
APPEARANCE UR: ABNORMAL
AST SERPL-CCNC: 18 U/L (ref 12–45)
BACTERIA #/AREA URNS HPF: ABNORMAL /HPF
BARBITURATES UR QL SCN: NEGATIVE
BASOPHILS # BLD AUTO: 0.1 % (ref 0–1.8)
BASOPHILS # BLD: 0.01 K/UL (ref 0–0.12)
BENZODIAZ UR QL SCN: POSITIVE
BILIRUB SERPL-MCNC: 0.7 MG/DL (ref 0.1–1.5)
BILIRUB UR QL STRIP.AUTO: NEGATIVE
BUN SERPL-MCNC: 32 MG/DL (ref 8–22)
BZE UR QL SCN: NEGATIVE
CALCIUM SERPL-MCNC: 9.6 MG/DL (ref 8.5–10.5)
CANNABINOIDS UR QL SCN: POSITIVE
CHLORIDE SERPL-SCNC: 92 MMOL/L (ref 96–112)
CO2 SERPL-SCNC: 28 MMOL/L (ref 20–33)
COLOR UR: ABNORMAL
CREAT SERPL-MCNC: 0.75 MG/DL (ref 0.5–1.4)
CULTURE IF INDICATED INDCX: YES UA CULTURE
EOSINOPHIL # BLD AUTO: 0 K/UL (ref 0–0.51)
EOSINOPHIL NFR BLD: 0 % (ref 0–6.9)
EPI CELLS #/AREA URNS HPF: ABNORMAL /HPF
ERYTHROCYTE [DISTWIDTH] IN BLOOD BY AUTOMATED COUNT: 41.2 FL (ref 35.9–50)
GLOBULIN SER CALC-MCNC: 3.3 G/DL (ref 1.9–3.5)
GLUCOSE SERPL-MCNC: 115 MG/DL (ref 65–99)
GLUCOSE UR STRIP.AUTO-MCNC: NEGATIVE MG/DL
HCG SERPL QL: NEGATIVE
HCT VFR BLD AUTO: 42.1 % (ref 37–47)
HGB BLD-MCNC: 15.6 G/DL (ref 12–16)
HYALINE CASTS #/AREA URNS LPF: ABNORMAL /LPF
IMM GRANULOCYTES # BLD AUTO: 0.08 K/UL (ref 0–0.11)
IMM GRANULOCYTES NFR BLD AUTO: 0.5 % (ref 0–0.9)
KETONES UR STRIP.AUTO-MCNC: ABNORMAL MG/DL
LEUKOCYTE ESTERASE UR QL STRIP.AUTO: NEGATIVE
LIPASE SERPL-CCNC: 3 U/L (ref 11–82)
LYMPHOCYTES # BLD AUTO: 1.33 K/UL (ref 1–4.8)
LYMPHOCYTES NFR BLD: 8.7 % (ref 22–41)
MAGNESIUM SERPL-MCNC: 2.3 MG/DL (ref 1.5–2.5)
MCH RBC QN AUTO: 33.6 PG (ref 27–33)
MCHC RBC AUTO-ENTMCNC: 37.1 G/DL (ref 33.6–35)
MCV RBC AUTO: 90.7 FL (ref 81.4–97.8)
METHADONE UR QL SCN: NEGATIVE
MICRO URNS: ABNORMAL
MONOCYTES # BLD AUTO: 1.12 K/UL (ref 0–0.85)
MONOCYTES NFR BLD AUTO: 7.3 % (ref 0–13.4)
MUCOUS THREADS #/AREA URNS HPF: ABNORMAL /HPF
NEUTROPHILS # BLD AUTO: 12.77 K/UL (ref 2–7.15)
NEUTROPHILS NFR BLD: 83.4 % (ref 44–72)
NITRITE UR QL STRIP.AUTO: NEGATIVE
NRBC # BLD AUTO: 0 K/UL
NRBC BLD-RTO: 0 /100 WBC
OPIATES UR QL SCN: POSITIVE
OXYCODONE UR QL SCN: NEGATIVE
PCP UR QL SCN: NEGATIVE
PH UR STRIP.AUTO: 5.5 [PH]
PLATELET # BLD AUTO: 250 K/UL (ref 164–446)
PMV BLD AUTO: 10.6 FL (ref 9–12.9)
POTASSIUM SERPL-SCNC: 3.1 MMOL/L (ref 3.6–5.5)
PROPOXYPH UR QL SCN: NEGATIVE
PROT SERPL-MCNC: 8.4 G/DL (ref 6–8.2)
PROT UR QL STRIP: 100 MG/DL
RBC # BLD AUTO: 4.64 M/UL (ref 4.2–5.4)
RBC # URNS HPF: ABNORMAL /HPF
RBC UR QL AUTO: ABNORMAL
SODIUM SERPL-SCNC: 135 MMOL/L (ref 135–145)
SP GR UR STRIP.AUTO: 1.03
UROBILINOGEN UR STRIP.AUTO-MCNC: 1 MG/DL
WBC # BLD AUTO: 15.3 K/UL (ref 4.8–10.8)
WBC #/AREA URNS HPF: ABNORMAL /HPF

## 2018-03-05 PROCEDURE — 96361 HYDRATE IV INFUSION ADD-ON: CPT

## 2018-03-05 PROCEDURE — 36415 COLL VENOUS BLD VENIPUNCTURE: CPT

## 2018-03-05 PROCEDURE — 700105 HCHG RX REV CODE 258: Performed by: EMERGENCY MEDICINE

## 2018-03-05 PROCEDURE — 99285 EMERGENCY DEPT VISIT HI MDM: CPT

## 2018-03-05 PROCEDURE — 80053 COMPREHEN METABOLIC PANEL: CPT | Mod: 91

## 2018-03-05 PROCEDURE — 83735 ASSAY OF MAGNESIUM: CPT

## 2018-03-05 PROCEDURE — 700111 HCHG RX REV CODE 636 W/ 250 OVERRIDE (IP): Performed by: EMERGENCY MEDICINE

## 2018-03-05 PROCEDURE — 80053 COMPREHEN METABOLIC PANEL: CPT

## 2018-03-05 PROCEDURE — 96375 TX/PRO/DX INJ NEW DRUG ADDON: CPT

## 2018-03-05 PROCEDURE — 700102 HCHG RX REV CODE 250 W/ 637 OVERRIDE(OP): Performed by: EMERGENCY MEDICINE

## 2018-03-05 PROCEDURE — 81001 URINALYSIS AUTO W/SCOPE: CPT | Mod: XU

## 2018-03-05 PROCEDURE — 96374 THER/PROPH/DIAG INJ IV PUSH: CPT

## 2018-03-05 PROCEDURE — A9270 NON-COVERED ITEM OR SERVICE: HCPCS | Performed by: EMERGENCY MEDICINE

## 2018-03-05 PROCEDURE — 80307 DRUG TEST PRSMV CHEM ANLYZR: CPT

## 2018-03-05 PROCEDURE — 85025 COMPLETE CBC W/AUTO DIFF WBC: CPT

## 2018-03-05 PROCEDURE — 83690 ASSAY OF LIPASE: CPT

## 2018-03-05 PROCEDURE — 84703 CHORIONIC GONADOTROPIN ASSAY: CPT

## 2018-03-05 PROCEDURE — 83690 ASSAY OF LIPASE: CPT | Mod: 91

## 2018-03-05 PROCEDURE — 85025 COMPLETE CBC W/AUTO DIFF WBC: CPT | Mod: 91

## 2018-03-05 PROCEDURE — 87086 URINE CULTURE/COLONY COUNT: CPT

## 2018-03-05 RX ORDER — DEXTROSE AND SODIUM CHLORIDE 5; .9 G/100ML; G/100ML
INJECTION, SOLUTION INTRAVENOUS CONTINUOUS
Status: DISCONTINUED | OUTPATIENT
Start: 2018-03-05 | End: 2018-03-05 | Stop reason: HOSPADM

## 2018-03-05 RX ORDER — POTASSIUM CHLORIDE 20 MEQ/1
20 TABLET, EXTENDED RELEASE ORAL 2 TIMES DAILY
Qty: 30 TAB | Refills: 0 | Status: SHIPPED | OUTPATIENT
Start: 2018-03-05 | End: 2018-03-05

## 2018-03-05 RX ORDER — ONDANSETRON 4 MG/1
4 TABLET, ORALLY DISINTEGRATING ORAL EVERY 8 HOURS PRN
Qty: 10 TAB | Refills: 0 | Status: SHIPPED | OUTPATIENT
Start: 2018-03-05 | End: 2018-03-05

## 2018-03-05 RX ORDER — ONDANSETRON 2 MG/ML
4 INJECTION INTRAMUSCULAR; INTRAVENOUS ONCE
Status: COMPLETED | OUTPATIENT
Start: 2018-03-05 | End: 2018-03-05

## 2018-03-05 RX ORDER — POTASSIUM CHLORIDE 20 MEQ/1
40 TABLET, EXTENDED RELEASE ORAL ONCE
Status: COMPLETED | OUTPATIENT
Start: 2018-03-05 | End: 2018-03-05

## 2018-03-05 RX ORDER — KETOROLAC TROMETHAMINE 30 MG/ML
15 INJECTION, SOLUTION INTRAMUSCULAR; INTRAVENOUS ONCE
Status: COMPLETED | OUTPATIENT
Start: 2018-03-05 | End: 2018-03-05

## 2018-03-05 RX ADMIN — DEXTROSE AND SODIUM CHLORIDE 1000 ML: 5; 900 INJECTION, SOLUTION INTRAVENOUS at 06:40

## 2018-03-05 RX ADMIN — KETOROLAC TROMETHAMINE 15 MG: 30 INJECTION, SOLUTION INTRAMUSCULAR; INTRAVENOUS at 06:52

## 2018-03-05 RX ADMIN — ONDANSETRON HYDROCHLORIDE 4 MG: 2 INJECTION, SOLUTION INTRAMUSCULAR; INTRAVENOUS at 06:40

## 2018-03-05 RX ADMIN — POTASSIUM CHLORIDE 40 MEQ: 1500 TABLET, EXTENDED RELEASE ORAL at 07:12

## 2018-03-05 ASSESSMENT — LIFESTYLE VARIABLES: DO YOU DRINK ALCOHOL: NO

## 2018-03-05 ASSESSMENT — PAIN SCALES - GENERAL
PAINLEVEL_OUTOF10: 5
PAINLEVEL_OUTOF10: 0

## 2018-03-05 NOTE — ED PROVIDER NOTES
ED Provider Note    CHIEF COMPLAINT  Chief Complaint   Patient presents with   • Seizure       HPI  Rupa Yang is a 27 y.o. female who presents with possible seizure-like activity. Patient has a history of recurrent abdominal pain. She reports that when her pain gets very severe and she is vomiting a lot she will have a pseudo-seizure.  She was in the emergency department 2 nights ago with nausea vomiting and abdominal pain. She was treated symptomatically and discharged. When she went home she realized that she did not have any more Norco. She woke up today with recurrent pain. This is generalized cramping. Nonradiating. Worse and when she tries to eat or when she has bowel movements. She's not had any diarrhea. She has not had a fever. Denies dysuria or hematuria frequency. No vaginal discharge. Currently mild menstruation. She is not pregnant.    She has been seen by multiple specialists for this. She has had upper and lower endoscopies for the same pain without identified problem. She had biliary dyskinesia and had her gallbladder out, but this did not improve any of her pain. She is seen by gynecology. She was given the depot shot to abort her periods to see if this stops her pain, but thus far has not worked. She has been told that she's had cyclical vomiting related to marijuana in the past. She reports no marijuana use in the last 2 months.    Medical record is reviewed. Multiple visits for the same. She did have a hospital admission after possibly having VT, but cardiology consultation felt that arrhythmia was artifact related to seizure.    Upon discussion about potential treatments, the patient requests only morphine or Dilaudid. She says that these are the only medications that help her symptoms. She tells me that she just wanted to be treated so that she can go home and take her kids to school. She does not believe that she requires any diagnostic tests.    REVIEW OF SYSTEMS  As per HPI,  "otherwise a 10 point review of systems is negative    PAST MEDICAL HISTORY  Palpitations, recurrent abdominal pain/chronic abdominal pain, scoliosis. substance abuse including meth, cocaine, heroin    SOCIAL HISTORY  Social History   Substance Use Topics   • Smoking status: Current Every Day Smoker     Packs/day: 0.50     Types: Cigarettes   • Smokeless tobacco: Never Used   • Alcohol use No       SURGICAL HISTORY  Past Surgical History:   Procedure Laterality Date   • JUDY BY LAPAROSCOPY  3/3/2015    Procedure: JUDY BY LAPAROSCOPY;  Surgeon: Aftab Churchill M.D.;  Location: SURGERY [facility];  Service:    • PRIMARY C SECTION  11/27/2013    Performed by Haider Garcia M.D. at [facility] DELIVERY       CURRENT MEDICATIONS  Home Medications    **Home medications have not yet been reviewed for this encounter**         ALLERGIES  Allergies   Allergen Reactions   • Divalproex Sodium Hives and Rash         • Risperidone Hives and Rash         • Sertraline Hcl [Zoloft] Hives and Rash         • Tegretol [Carbamazepine] Hives and Rash             PHYSICAL EXAM  VITAL SIGNS: /64   Pulse 80   Temp 36.6 °C (97.9 °F)   Resp 15   Ht 1.651 m (5' 5\")   Wt 56.2 kg (124 lb)   SpO2 95%   BMI 20.63 kg/m²    Constitutional: Awake and alert  HENT:  Atraumatic, Normocephalic.Oropharynx dry mucus membranes, Nose normal inspection.   Eyes: Normal inspection  Neck: Supple  Cardiovascular: Normal heart rate, Normal rhythm.  Symmetric peripheral pulses.   Thorax & Lungs: No respiratory distress, No wheezing, No rales, No rhonchi, No chest tenderness.   Abdomen: Bowel sounds normal, soft, non-distended, nontender, no mass  Skin: Warm, Dry, No rash.   Back: No tenderness, No CVA tenderness.   Extremities: No clubbing, cyanosis, edema, no Homans or cords   Neurologic: Grossly normal   Psychiatric: Anxious appearing      Labs:  Results for orders placed or performed during the hospital encounter of 03/05/18   CBC WITH " DIFFERENTIAL   Result Value Ref Range    WBC 15.3 (H) 4.8 - 10.8 K/uL    RBC 4.64 4.20 - 5.40 M/uL    Hemoglobin 15.6 12.0 - 16.0 g/dL    Hematocrit 42.1 37.0 - 47.0 %    MCV 90.7 81.4 - 97.8 fL    MCH 33.6 (H) 27.0 - 33.0 pg    MCHC 37.1 (H) 33.6 - 35.0 g/dL    RDW 41.2 35.9 - 50.0 fL    Platelet Count 250 164 - 446 K/uL    MPV 10.6 9.0 - 12.9 fL    Neutrophils-Polys 83.40 (H) 44.00 - 72.00 %    Lymphocytes 8.70 (L) 22.00 - 41.00 %    Monocytes 7.30 0.00 - 13.40 %    Eosinophils 0.00 0.00 - 6.90 %    Basophils 0.10 0.00 - 1.80 %    Immature Granulocytes 0.50 0.00 - 0.90 %    Nucleated RBC 0.00 /100 WBC    Neutrophils (Absolute) 12.77 (H) 2.00 - 7.15 K/uL    Lymphs (Absolute) 1.33 1.00 - 4.80 K/uL    Monos (Absolute) 1.12 (H) 0.00 - 0.85 K/uL    Eos (Absolute) 0.00 0.00 - 0.51 K/uL    Baso (Absolute) 0.01 0.00 - 0.12 K/uL    Immature Granulocytes (abs) 0.08 0.00 - 0.11 K/uL    NRBC (Absolute) 0.00 K/uL   COMP METABOLIC PANEL   Result Value Ref Range    Sodium 135 135 - 145 mmol/L    Potassium 3.1 (L) 3.6 - 5.5 mmol/L    Chloride 92 (L) 96 - 112 mmol/L    Co2 28 20 - 33 mmol/L    Anion Gap 15.0 (H) 0.0 - 11.9    Glucose 115 (H) 65 - 99 mg/dL    Bun 32 (H) 8 - 22 mg/dL    Creatinine 0.75 0.50 - 1.40 mg/dL    Calcium 9.6 8.5 - 10.5 mg/dL    AST(SGOT) 18 12 - 45 U/L    ALT(SGPT) 12 2 - 50 U/L    Alkaline Phosphatase 91 30 - 99 U/L    Total Bilirubin 0.7 0.1 - 1.5 mg/dL    Albumin 5.1 (H) 3.2 - 4.9 g/dL    Total Protein 8.4 (H) 6.0 - 8.2 g/dL    Globulin 3.3 1.9 - 3.5 g/dL    A-G Ratio 1.5 g/dL   LIPASE   Result Value Ref Range    Lipase 3 (L) 11 - 82 U/L   HCG QUAL SERUM   Result Value Ref Range    Beta-Hcg Qualitative Serum Negative Negative   URINALYSIS,CULTURE IF INDICATED   Result Value Ref Range    Color DK Yellow     Character Cloudy (A)     Specific Gravity 1.026 <1.035    Ph 5.5 5.0 - 8.0    Glucose Negative Negative mg/dL    Ketones Trace (A) Negative mg/dL    Protein 100 (A) Negative mg/dL    Bilirubin  Negative Negative    Urobilinogen, Urine 1.0 Negative    Nitrite Negative Negative    Leukocyte Esterase Negative Negative    Occult Blood Trace (A) Negative    Micro Urine Req Microscopic     Culture Indicated Yes UA Culture   URINE DRUG SCREEN   Result Value Ref Range    Amphetamines Urine Positive (A) Negative    Barbiturates Negative Negative    Benzodiazepines Positive (A) Negative    Cocaine Metabolite Negative Negative    Methadone Negative Negative    Opiates Positive (A) Negative    Oxycodone Negative Negative    Phencyclidine -Pcp Negative Negative    Propoxyphene Negative Negative    Cannabinoid Metab Positive (A) Negative   MAGNESIUM   Result Value Ref Range    Magnesium 2.3 1.5 - 2.5 mg/dL   ESTIMATED GFR   Result Value Ref Range    GFR If African American >60 >60 mL/min/1.73 m 2    GFR If Non African American >60 >60 mL/min/1.73 m 2   URINE MICROSCOPIC (W/UA)   Result Value Ref Range    WBC 5-10 (A) /hpf    RBC 2-5 (A) /hpf    Bacteria Few (A) None /hpf    Epithelial Cells Many (A) /hpf    Mucous Threads Many /hpf    Hyaline Cast 3-5 (A) /lpf         COURSE & MEDICAL DECISION MAKING  The patient presents with recurrent abdominal pain nausea and vomiting. Her chart was reviewed as summarized above. She did appear dehydrated with dry mucous membranes and normal saline was ordered. I ordered Zofran for nausea and Toradol for discomfort. She requested Dilaudid several times although this does not appear to be indicated. I discussed this with her.    1140- patient started shaking all over. I went to the bedside. Upon being asked a question the shaking stopped and she began conversing immediately.    Data began to return. She does have leukocytosis. This is similar to previous. She does have acidosis consistent with dehydration. The patient had several episodes of pseudoseizures that would stop when she was asked to stop. She does not have a surgical abdominal exam. While in the process of being hydrated the  patient decided that she did not want to stay in the hospital any longer. She would not tell me why she needed to leave. I suspect at least some desire to leave it is related to not receive any narcotic pain medications here for this chronic condition. She stated that she would sign out AMA. My plan was to treat her dehydration and hypokalemia. She was given oral potassium was able to drink liquids well in the ER. She did not have any vomiting while here. She is awake alert and appears to be of sound mind and capable of making medical decisions. I have given her prescription for Zofran. I have also given her a prescription for oral potassium. I have asked her to follow up with her primary doctor within 1 week. I precautioned her to return to the ER if she changes her mind regarding ongoing care or has concerning symptoms.    After the patient was discharged her urine drug screen returned. This is positive for amphetamines, opiates as well as cannabinoids. Benzodiazepines were administered in route and thus an expected finding. Patient denied using any illicit drugs taking opiates or marijuana. Undoubtedly or drug abuse is playing a role with her symptoms.      FINAL IMPRESSION  1. Chronic abdominal pain  2. Recurrent vomiting with dehydration  3. Hypokalemia  4. Pseudoseizure  5. Polysubstance abuse  6. Drug-seeking behavior    Discharged against medical advice      This dictation was created using voice recognition software. The accuracy of the dictation is limited to the abilities of the software.  The nursing notes were reviewed and certain aspects of this information were incorporated into this note.      Electronically signed by: Erick Matt, 3/5/2018 6:29 AM

## 2018-03-05 NOTE — DISCHARGE INSTRUCTIONS
Hypokalemia  Hypokalemia means a low potassium level in the blood. Symptoms may include muscle weakness and cramping, fatigue, abdominal pain, vomiting, constipation, or irregularities of the heartbeat. Sometimes hypokalemia is discovered by your caregiver if you are taking certain medicines for high blood pressure or kidney disease.   Potassium is an electrolyte that helps regulate the amount of fluid in the body. It also stimulates muscle contraction and maintains a stable acid-base balance. If potassium levels go too low or too high, your health may be in danger. You are at risk for developing shock, heart, and lung problems. Hypokalemia can occur if you have excessive diarrhea, vomiting, or sweating. Potassium can be lost through your kidneys in the urine. Certain common medicines can also cause potassium loss, especially water pills (diuretics). The same is possible with cortisone medications or certain types of antibiotics. Low potassium can be dangerous if you are taking certain heart medicines. In diabetes, your potassium may fall after you take insulin, especially if your diabetes had been out of control for a while. In rare cases, potassium may be low because you are not getting enough in your diet.   In adults, a potassium level below 3.5 mEq/L is usually considered low.  Hypokalemia can be treated with potassium supplements taken by mouth and a diet that is high in potassium. Foods with high potassium content are:  · Peas, lentils, lima beans, nuts, and dried fruit.   · Whole grain and bran cereals and breads.   · Fresh fruit and vegetables. Examples include:   · Bananas.   · Cantaloupe.   · Grapefruit.   · Oranges.   · Tomatoes.   · Honeydew melons.   · Potatoes.   · Peaches.   · Orange and tomato juices.   · Meats.   See your caregiver as instructed for a follow-up blood test to be sure your potassium is back to normal.  SEEK MEDICAL CARE IF:   · You have nausea, vomiting, constipation, or abdominal  pain.   · You have palpitations or irregular heartbeats, chest pain or shortness of breath.   · You have muscle cramps or weakness or fatigue.   · You have lethargy.   SEEK IMMEDIATE MEDICAL CARE IF:   · You have paralysis.   · You have confusion or other mental status changes.   Document Released: 12/18/2006 Document Revised: 03/11/2013 Document Reviewed: 04/13/2011  ExitCare® Patient Information ©2013 [employer].    Dehydration, Adult  Dehydration is a condition in which there is not enough fluid or water in the body. This happens when you lose more fluids than you take in. Important organs, such as the kidneys, brain, and heart, cannot function without a proper amount of fluids. Any loss of fluids from the body can lead to dehydration.  Dehydration can range from mild to severe. This condition should be treated right away to prevent it from becoming severe.  What are the causes?  This condition may be caused by:  · Vomiting.  · Diarrhea.  · Excessive sweating, such as from heat exposure or exercise.  · Not drinking enough fluid, especially:  ¨ When ill.  ¨ While doing activity that requires a lot of energy.  · Excessive urination.  · Fever.  · Infection.  · Certain medicines, such as medicines that cause the body to lose excess fluid (diuretics).  · Inability to access safe drinking water.  · Reduced physical ability to get adequate water and food.  What increases the risk?  This condition is more likely to develop in people:  · Who have a poorly controlled long-term (chronic) illness, such as diabetes, heart disease, or kidney disease.  · Who are age 65 or older.  · Who are disabled.  · Who live in a place with high altitude.  · Who play endurance sports.  What are the signs or symptoms?  Symptoms of mild dehydration may include:  · Thirst.  · Dry lips.  · Slightly dry mouth.  · Dry, warm skin.  · Dizziness.  Symptoms of moderate dehydration may include:  · Very dry mouth.  · Muscle cramps.  · Dark urine.  Urine may be the color of tea.  · Decreased urine production.  · Decreased tear production.  · Heartbeat that is irregular or faster than normal (palpitations).  · Headache.  · Light-headedness, especially when you stand up from a sitting position.  · Fainting (syncope).  Symptoms of severe dehydration may include:  · Changes in skin, such as:  ¨ Cold and clammy skin.  ¨ Blotchy (mottled) or pale skin.  ¨ Skin that does not quickly return to normal after being lightly pinched and released (poor skin turgor).  · Changes in body fluids, such as:  ¨ Extreme thirst.  ¨ No tear production.  ¨ Inability to sweat when body temperature is high, such as in hot weather.  ¨ Very little urine production.  · Changes in vital signs, such as:  ¨ Weak pulse.  ¨ Pulse that is more than 100 beats a minute when sitting still.  ¨ Rapid breathing.  ¨ Low blood pressure.  · Other changes, such as:  ¨ Sunken eyes.  ¨ Cold hands and feet.  ¨ Confusion.  ¨ Lack of energy (lethargy).  ¨ Difficulty waking up from sleep.  ¨ Short-term weight loss.  ¨ Unconsciousness.  How is this diagnosed?  This condition is diagnosed based on your symptoms and a physical exam. Blood and urine tests may be done to help confirm the diagnosis.  How is this treated?  Treatment for this condition depends on the severity. Mild or moderate dehydration can often be treated at home. Treatment should be started right away. Do not wait until dehydration becomes severe. Severe dehydration is an emergency and it needs to be treated in a hospital.  Treatment for mild dehydration may include:  · Drinking more fluids.  · Replacing salts and minerals in your blood (electrolytes) that you may have lost.  Treatment for moderate dehydration may include:  · Drinking an oral rehydration solution (ORS). This is a drink that helps you replace fluids and electrolytes (rehydrate). It can be found at pharmacies and retail stores.  Treatment for severe dehydration may  include:  · Receiving fluids through an IV tube.  · Receiving an electrolyte solution through a feeding tube that is passed through your nose and into your stomach (nasogastric tube, or NG tube).  · Correcting any abnormalities in electrolytes.  · Treating the underlying cause of dehydration.  Follow these instructions at home:  · If directed by your health care provider, drink an ORS:  ¨ Make an ORS by following instructions on the package.  ¨ Start by drinking small amounts, about ½ cup (120 mL) every 5-10 minutes.  ¨ Slowly increase how much you drink until you have taken the amount recommended by your health care provider.  · Drink enough clear fluid to keep your urine clear or pale yellow. If you were told to drink an ORS, finish the ORS first, then start slowly drinking other clear fluids. Drink fluids such as:  ¨ Water. Do not drink only water. Doing that can lead to having too little salt (sodium) in the body (hyponatremia).  ¨ Ice chips.  ¨ Fruit juice that you have added water to (diluted fruit juice).  ¨ Low-calorie sports drinks.  · Avoid:  ¨ Alcohol.  ¨ Drinks that contain a lot of sugar. These include high-calorie sports drinks, fruit juice that is not diluted, and soda.  ¨ Caffeine.  ¨ Foods that are greasy or contain a lot of fat or sugar.  · Take over-the-counter and prescription medicines only as told by your health care provider.  · Do not take sodium tablets. This can lead to having too much sodium in the body (hypernatremia).  · Eat foods that contain a healthy balance of electrolytes, such as bananas, oranges, potatoes, tomatoes, and spinach.  · Keep all follow-up visits as told by your health care provider. This is important.  Contact a health care provider if:  · You have abdominal pain that:  ¨ Gets worse.  ¨ Stays in one area (localizes).  · You have a rash.  · You have a stiff neck.  · You are more irritable than usual.  · You are sleepier or more difficult to wake up than usual.  · You  feel weak or dizzy.  · You feel very thirsty.  · You have urinated only a small amount of very dark urine over 6-8 hours.  Get help right away if:  · You have symptoms of severe dehydration.  · You cannot drink fluids without vomiting.  · Your symptoms get worse with treatment.  · You have a fever.  · You have a severe headache.  · You have vomiting or diarrhea that:  ¨ Gets worse.  ¨ Does not go away.  · You have blood or green matter (bile) in your vomit.  · You have blood in your stool. This may cause stool to look black and tarry.  · You have not urinated in 6-8 hours.  · You faint.  · Your heart rate while sitting still is over 100 beats a minute.  · You have trouble breathing.  This information is not intended to replace advice given to you by your health care provider. Make sure you discuss any questions you have with your health care provider.  Document Released: 12/18/2006 Document Revised: 07/14/2017 Document Reviewed: 02/10/2017  [employer] Interactive Patient Education © 2017 [employer]

## 2018-03-05 NOTE — ED NOTES
Pt conitues to have seizure like activity, able to talk through them and make request..  ERP aware pt co/ 15/10 pain. toradol given

## 2018-03-05 NOTE — ED TRIAGE NOTES
Pt had alleged seizure approx an hour ago.   Was here last night for same reason, pt given 4 versed in route.

## 2018-03-06 VITALS
RESPIRATION RATE: 16 BRPM | BODY MASS INDEX: 23.71 KG/M2 | HEIGHT: 64 IN | WEIGHT: 138.89 LBS | HEART RATE: 72 BPM | OXYGEN SATURATION: 97 % | TEMPERATURE: 98.1 F

## 2018-03-06 LAB
ALBUMIN SERPL BCP-MCNC: 4.7 G/DL (ref 3.2–4.9)
ALBUMIN/GLOB SERPL: 1.6 G/DL
ALP SERPL-CCNC: 79 U/L (ref 30–99)
ALT SERPL-CCNC: 12 U/L (ref 2–50)
ANION GAP SERPL CALC-SCNC: 11 MMOL/L (ref 0–11.9)
AST SERPL-CCNC: 15 U/L (ref 12–45)
BASOPHILS # BLD AUTO: 0.1 % (ref 0–1.8)
BASOPHILS # BLD: 0.01 K/UL (ref 0–0.12)
BILIRUB SERPL-MCNC: 0.9 MG/DL (ref 0.1–1.5)
BUN SERPL-MCNC: 29 MG/DL (ref 8–22)
CALCIUM SERPL-MCNC: 9.9 MG/DL (ref 8.5–10.5)
CHLORIDE SERPL-SCNC: 94 MMOL/L (ref 96–112)
CO2 SERPL-SCNC: 29 MMOL/L (ref 20–33)
CREAT SERPL-MCNC: 0.73 MG/DL (ref 0.5–1.4)
EOSINOPHIL # BLD AUTO: 0.01 K/UL (ref 0–0.51)
EOSINOPHIL NFR BLD: 0.1 % (ref 0–6.9)
ERYTHROCYTE [DISTWIDTH] IN BLOOD BY AUTOMATED COUNT: 40.6 FL (ref 35.9–50)
GLOBULIN SER CALC-MCNC: 2.9 G/DL (ref 1.9–3.5)
GLUCOSE SERPL-MCNC: 102 MG/DL (ref 65–99)
HCT VFR BLD AUTO: 39.5 % (ref 37–47)
HGB BLD-MCNC: 14.8 G/DL (ref 12–16)
IMM GRANULOCYTES # BLD AUTO: 0.04 K/UL (ref 0–0.11)
IMM GRANULOCYTES NFR BLD AUTO: 0.4 % (ref 0–0.9)
LIPASE SERPL-CCNC: 39 U/L (ref 11–82)
LYMPHOCYTES # BLD AUTO: 1.53 K/UL (ref 1–4.8)
LYMPHOCYTES NFR BLD: 14.5 % (ref 22–41)
MCH RBC QN AUTO: 34 PG (ref 27–33)
MCHC RBC AUTO-ENTMCNC: 37.5 G/DL (ref 33.6–35)
MCV RBC AUTO: 90.8 FL (ref 81.4–97.8)
MONOCYTES # BLD AUTO: 0.98 K/UL (ref 0–0.85)
MONOCYTES NFR BLD AUTO: 9.3 % (ref 0–13.4)
NEUTROPHILS # BLD AUTO: 7.99 K/UL (ref 2–7.15)
NEUTROPHILS NFR BLD: 75.6 % (ref 44–72)
NRBC # BLD AUTO: 0 K/UL
NRBC BLD-RTO: 0 /100 WBC
PLATELET # BLD AUTO: 231 K/UL (ref 164–446)
PMV BLD AUTO: 10.8 FL (ref 9–12.9)
POTASSIUM SERPL-SCNC: 3.2 MMOL/L (ref 3.6–5.5)
PROT SERPL-MCNC: 7.6 G/DL (ref 6–8.2)
RBC # BLD AUTO: 4.35 M/UL (ref 4.2–5.4)
SODIUM SERPL-SCNC: 134 MMOL/L (ref 135–145)
WBC # BLD AUTO: 10.6 K/UL (ref 4.8–10.8)

## 2018-03-06 PROCEDURE — 700102 HCHG RX REV CODE 250 W/ 637 OVERRIDE(OP): Performed by: EMERGENCY MEDICINE

## 2018-03-06 PROCEDURE — 700111 HCHG RX REV CODE 636 W/ 250 OVERRIDE (IP): Performed by: EMERGENCY MEDICINE

## 2018-03-06 PROCEDURE — 96360 HYDRATION IV INFUSION INIT: CPT

## 2018-03-06 PROCEDURE — 700105 HCHG RX REV CODE 258: Performed by: EMERGENCY MEDICINE

## 2018-03-06 PROCEDURE — 96372 THER/PROPH/DIAG INJ SC/IM: CPT | Mod: XU

## 2018-03-06 PROCEDURE — A9270 NON-COVERED ITEM OR SERVICE: HCPCS | Performed by: EMERGENCY MEDICINE

## 2018-03-06 RX ORDER — DICYCLOMINE HYDROCHLORIDE 10 MG/ML
20 INJECTION INTRAMUSCULAR ONCE
Status: COMPLETED | OUTPATIENT
Start: 2018-03-06 | End: 2018-03-06

## 2018-03-06 RX ORDER — ACETAMINOPHEN 325 MG/1
650 TABLET ORAL ONCE
Status: COMPLETED | OUTPATIENT
Start: 2018-03-06 | End: 2018-03-06

## 2018-03-06 RX ORDER — SODIUM CHLORIDE 9 MG/ML
1000 INJECTION, SOLUTION INTRAVENOUS ONCE
Status: COMPLETED | OUTPATIENT
Start: 2018-03-06 | End: 2018-03-06

## 2018-03-06 RX ORDER — POTASSIUM CHLORIDE 20 MEQ/1
40 TABLET, EXTENDED RELEASE ORAL ONCE
Status: COMPLETED | OUTPATIENT
Start: 2018-03-06 | End: 2018-03-06

## 2018-03-06 RX ADMIN — SODIUM CHLORIDE 1000 ML: 9 INJECTION, SOLUTION INTRAVENOUS at 00:24

## 2018-03-06 RX ADMIN — LIDOCAINE HYDROCHLORIDE 30 ML: 20 SOLUTION OROPHARYNGEAL at 00:27

## 2018-03-06 RX ADMIN — ACETAMINOPHEN 650 MG: 325 TABLET, FILM COATED ORAL at 02:31

## 2018-03-06 RX ADMIN — DICYCLOMINE HYDROCHLORIDE 20 MG: 10 INJECTION INTRAMUSCULAR at 00:27

## 2018-03-06 RX ADMIN — POTASSIUM CHLORIDE 40 MEQ: 1500 TABLET, EXTENDED RELEASE ORAL at 01:05

## 2018-03-06 NOTE — ED TRIAGE NOTES
Chief Complaint   Patient presents with   • N/V     Pt BIB [facility] Pt states n/v and abdominal pain for three days   • Abdominal Pain   • Seizure     pt has hx of psuedo seizures.  Per EMS pt was unresposive to sternal rub on arrival     Pt states ongoing abdominal pain that she has been in to this ER twice in the last two nights.  Pt was given 4 zofran and 100 mcg fentynal in transport.  Pt on monitor and chart up for MD>

## 2018-03-06 NOTE — ED NOTES
Pt ambulatory  Vital signs stable  Pt handed d/c paperwork with understanding stated  Pt states will follow up with PCP  Pt states safe way home

## 2018-03-06 NOTE — DISCHARGE INSTRUCTIONS
Abdominal Pain (Nonspecific)  Your exam might not show the exact reason you have abdominal pain. Since there are many different causes of abdominal pain, another checkup and more tests may be needed. It is very important to follow up for lasting (persistent) or worsening symptoms. A possible cause of abdominal pain in any person who still has his or her appendix is acute appendicitis. Appendicitis is often hard to diagnose. Normal blood tests, urine tests, ultrasound, and CT scans do not completely rule out early appendicitis or other causes of abdominal pain. Sometimes, only the changes that happen over time will allow appendicitis and other causes of abdominal pain to be determined. Other potential problems that may require surgery may also take time to become more apparent. Because of this, it is important that you follow all of the instructions below.  HOME CARE INSTRUCTIONS   · Rest as much as possible.   · Do not eat solid food until your pain is gone.   · While adults or children have pain: A diet of water, weak decaffeinated tea, broth or bouillon, gelatin, oral rehydration solutions (ORS), frozen ice pops, or ice chips may be helpful.   · When pain is gone in adults or children: Start a light diet (dry toast, crackers, applesauce, or white rice). Increase the diet slowly as long as it does not bother you. Eat no dairy products (including cheese and eggs) and no spicy, fatty, fried, or high-fiber foods.   · Use no alcohol, caffeine, or cigarettes.   · Take your regular medicines unless your caregiver told you not to.   · Take any prescribed medicine as directed.   · Only take over-the-counter or prescription medicines for pain, discomfort, or fever as directed by your caregiver. Do not give aspirin to children.   If your caregiver has given you a follow-up appointment, it is very important to keep that appointment. Not keeping the appointment could result in a permanent injury and/or lasting (chronic) pain  and/or disability. If there is any problem keeping the appointment, you must call to reschedule.   SEEK IMMEDIATE MEDICAL CARE IF:   · Your pain is not gone in 24 hours.   · Your pain becomes worse, changes location, or feels different.   · You or your child has an oral temperature above 102° F (38.9° C), not controlled by medicine.   · Your baby is older than 3 months with a rectal temperature of 102° F (38.9° C) or higher.   · Your baby is 3 months old or younger with a rectal temperature of 100.4° F (38° C) or higher.   · You have shaking chills.   · You keep throwing up (vomiting) or cannot drink liquids.   · There is blood in your vomit or you see blood in your bowel movements.   · Your bowel movements become dark or black.   · You have frequent bowel movements.   · Your bowel movements stop (become blocked) or you cannot pass gas.   · You have bloody, frequent, or painful urination.   · You have yellow discoloration in the skin or whites of the eyes.   · Your stomach becomes bloated or bigger.   · You have dizziness or fainting.   · You have chest or back pain.   MAKE SURE YOU:   · Understand these instructions.   · Will watch your condition.   · Will get help right away if you are not doing well or get worse.   Document Released: 12/18/2006 Document Revised: 03/11/2013 Document Reviewed: 11/15/2010  ExitCare® Patient Information ©2013 [employer].

## 2018-03-06 NOTE — ED PROVIDER NOTES
"ED Provider Note    Scribed for Clarisse Carlos M.D. by Kathleen Vidales. 3/6/2018, 12:06 AM.    Primary care provider: Pcp Not In Computer  Means of arrival: Ambulance  History obtained from: Patient  History limited by: None    CHIEF COMPLAINT  Chief Complaint   Patient presents with   • N/V     Pt BIB [facility] Pt states n/v and abdominal pain for three days   • Abdominal Pain   • Seizure     pt has hx of psuedo seizures.  Per EMS pt was unresposive to sternal rub on arrival       HPI  Rupa Yang is a 27 y.o. female who presents to the Emergency Department via ambulance for evaluation of abdominal pain, onset 3 days ago. Patient reports that the pain is similar to her recent, previous episodes. She endorses \"flashes\" of hot and cold but denies any vomiting or diarrhea. Patient reports that she has been able to keep her food and fluids down. Nausea improved with Zofran as administered by EMS. In the past, her abdominal pain resolved for up to 2 months.     Patient denies using marijuana or other recreational drugs. She drank wine 4 nights ago.      REVIEW OF SYSTEMS  Pertinent positives include abdominal pain, nausea (improved). Pertinent negatives include no vomiting, diarrhea, flashes of hot and cold.  All other systems reviewed and negative. C.     PAST MEDICAL HISTORY   has a past medical history of Arrhythmia; Dental disorder; Eczema; Gastroenteritis; Heart burn; Indigestion; Other specified disorder of intestines; Pain (02-24-15); Scoliosis; and Substance abuse.    SURGICAL HISTORY   has a past surgical history that includes primary c section (11/27/2013) and earl by laparoscopy (3/3/2015).    SOCIAL HISTORY  Social History   Substance Use Topics   • Smoking status: Current Every Day Smoker     Packs/day: 0.50     Types: Cigarettes   • Smokeless tobacco: Never Used   • Alcohol use No      History   Drug Use No     Comment: in past meth, cocaine, weed, states last used \"a long time ago\"       FAMILY " "HISTORY  Family History   Problem Relation Age of Onset   • Alcohol/Drug Mother      drugs   • Diabetes Mother      NIDDM   • Alcohol/Drug Father      drugs   • Alcohol/Drug Brother    • Hypertension Maternal Aunt    • Diabetes Maternal Aunt      IDDM   • Diabetes Maternal Grandmother      IDDM   • Diabetes Maternal Grandfather      IDDM   • Cancer Paternal Grandmother      breast   • Hypertension Paternal Grandmother        CURRENT MEDICATIONS  Home Medications     Reviewed by Devan Mckeon R.N. (Registered Nurse) on 03/05/18 at 2344  Med List Status: Not Addressed   Medication Last Dose Status        Patient Spencer Taking any Medications                       ALLERGIES  Allergies   Allergen Reactions   • Divalproex Sodium Hives and Rash         • Risperidone Hives and Rash         • Sertraline Hcl [Zoloft] Hives and Rash         • Tegretol [Carbamazepine] Hives and Rash             PHYSICAL EXAM  VITAL SIGNS: Pulse 69   Temp 36.7 °C (98.1 °F)   Resp 16   Ht 1.626 m (5' 4\")   Wt 63 kg (138 lb 14.2 oz)   SpO2 91%   BMI 23.84 kg/m²   Constitutional: Alert in no apparent distress.  HENT: No signs of trauma, Bilateral external ears normal, Nose normal. Dry mucous membranes  Eyes: Pupils are equal and reactive, Conjunctiva normal, Non-icteric.   Neck: Normal range of motion, No tenderness, Supple, No stridor.   Cardiovascular: Regular rate and rhythm, no murmurs.   Thorax & Lungs: Normal breath sounds, No respiratory distress, No wheezing, No chest tenderness.   Abdomen: Soft, no guarding or rebound. Does not really have any abdominal tenderness, No masses, No peritoneal signs.  Skin: Warm, Dry, No erythema, No rash.   Musculoskeletal:  No major deformities noted.   Neurologic: Alert, moving all extremities without difficulty, no focal deficits.      LABS  Labs Reviewed   CBC WITH DIFFERENTIAL - Abnormal; Notable for the following:        Result Value    MCH 34.0 (*)     MCHC 37.5 (*)     Neutrophils-Polys " 75.60 (*)     Lymphocytes 14.50 (*)     Neutrophils (Absolute) 7.99 (*)     Monos (Absolute) 0.98 (*)     All other components within normal limits   COMP METABOLIC PANEL - Abnormal; Notable for the following:     Sodium 134 (*)     Potassium 3.2 (*)     Chloride 94 (*)     Glucose 102 (*)     Bun 29 (*)     All other components within normal limits   LIPASE   ESTIMATED GFR   All labs reviewed by me.      COURSE & MEDICAL DECISION MAKING  Pertinent Labs & Imaging studies reviewed. (See chart for details).     Differential diagnoses include but are not limited to: Electrolyte abnormalities, dehydration, chronic abdominal pain    12:06 AM - Patient seen and examined at bedside. Patient was here this morning and last night for the same symptoms. She has been seen here multiple times in the past for the same symptoms. Patient requested Morphine and Dilaudid this morning, then left AMA. Her urine drug screen is positive for amphetamines, opiates, cannabinoids, and benzodiazepines, however EMS gave her benzodiazepines today for her pseudo-seizures.    Patient will be treated with Potassium Chloride 40 mEq, Dicyclomine 20 mg, and GI Cocktail. Will resuscitate with IV fluids given dry mucous membranes. Ordered CBC, CMP, Lipase, Estimated GFR to evaluate her symptoms.     2:25 AM Patient complaining of head ache, but otherwise tolerated her ice chips. Her labs showed some slight hypokalemia she was given potassium replacement for this. She also looks slightly dehydrated with an elevated BUN she was given IV fluids for this. On reassessment she felt significantly better. She tolerated her by mouth challenge without difficulty. The patient will be discharged at this time and should return if symptoms worsen or if new symptoms arise. The patient understands and agrees to plan.         DISPOSITION:  Patient will be discharged home in stable condition.    FOLLOW UP:  [facility], Emergency Dept  [address]  [place] [id number] [phone]    Return for worsening pain, vomiting, fever or other concerns      FINAL IMPRESSION  1. Epigastric pain    2. Hypokalemia           This dictation has been created using voice recognition software and/or scribes. The accuracy of the dictation is limited by the abilities of the software and the expertise of the scribes. I expect there may be some errors of grammar and possibly corntent. I made every attempt to manually correct the errors within my dictation. However, errors related to voice recognition software and/or scribes may still exist and should be interpreted within the appropriate context.     I, Kathleen Vidales (Scribe), am scribing for, and in the presence of, Clarisse Carlos M.D..    Electronically signed by: Kathleen Vidales (Scribmary lou), 3/6/2018    Clarisse MORALES M.D. personally performed the services described in this documentation, as scribed by Kathleen Vidales in my presence, and it is both accurate and complete.    The note accurately reflects work and decisions made by me.  Clarisse Carlos  3/6/2018  3:52 AM

## 2018-03-07 LAB
BACTERIA UR CULT: NORMAL
SIGNIFICANT IND 70042: NORMAL
SITE SITE: NORMAL
SOURCE SOURCE: NORMAL

## 2018-05-12 ENCOUNTER — HOSPITAL ENCOUNTER (EMERGENCY)
Facility: MEDICAL CENTER | Age: 28
End: 2018-05-12
Attending: EMERGENCY MEDICINE

## 2018-05-12 VITALS
BODY MASS INDEX: 24.75 KG/M2 | HEIGHT: 62 IN | RESPIRATION RATE: 23 BRPM | WEIGHT: 134.48 LBS | DIASTOLIC BLOOD PRESSURE: 89 MMHG | HEART RATE: 83 BPM | TEMPERATURE: 98.4 F | OXYGEN SATURATION: 93 % | SYSTOLIC BLOOD PRESSURE: 139 MMHG

## 2018-05-12 DIAGNOSIS — F44.5 PSEUDOSEIZURE: ICD-10-CM

## 2018-05-12 DIAGNOSIS — R10.9 CHRONIC ABDOMINAL PAIN: ICD-10-CM

## 2018-05-12 DIAGNOSIS — G89.29 CHRONIC ABDOMINAL PAIN: ICD-10-CM

## 2018-05-12 LAB
ALBUMIN SERPL BCP-MCNC: 4.9 G/DL (ref 3.2–4.9)
ALBUMIN/GLOB SERPL: 1.8 G/DL
ALP SERPL-CCNC: 78 U/L (ref 30–99)
ALT SERPL-CCNC: 14 U/L (ref 2–50)
AMPHET UR QL SCN: NEGATIVE
ANION GAP SERPL CALC-SCNC: 10 MMOL/L (ref 0–11.9)
APPEARANCE UR: ABNORMAL
AST SERPL-CCNC: 16 U/L (ref 12–45)
BACTERIA #/AREA URNS HPF: ABNORMAL /HPF
BARBITURATES UR QL SCN: NEGATIVE
BASOPHILS # BLD AUTO: 0.3 % (ref 0–1.8)
BASOPHILS # BLD: 0.03 K/UL (ref 0–0.12)
BENZODIAZ UR QL SCN: POSITIVE
BILIRUB SERPL-MCNC: 0.7 MG/DL (ref 0.1–1.5)
BILIRUB UR QL STRIP.AUTO: NEGATIVE
BUN SERPL-MCNC: 21 MG/DL (ref 8–22)
BZE UR QL SCN: NEGATIVE
CALCIUM SERPL-MCNC: 10.4 MG/DL (ref 8.5–10.5)
CANNABINOIDS UR QL SCN: POSITIVE
CHLORIDE SERPL-SCNC: 108 MMOL/L (ref 96–112)
CO2 SERPL-SCNC: 23 MMOL/L (ref 20–33)
COLOR UR: ABNORMAL
CREAT SERPL-MCNC: 0.72 MG/DL (ref 0.5–1.4)
EOSINOPHIL # BLD AUTO: 0.03 K/UL (ref 0–0.51)
EOSINOPHIL NFR BLD: 0.3 % (ref 0–6.9)
EPI CELLS #/AREA URNS HPF: ABNORMAL /HPF
ERYTHROCYTE [DISTWIDTH] IN BLOOD BY AUTOMATED COUNT: 43.1 FL (ref 35.9–50)
GLOBULIN SER CALC-MCNC: 2.8 G/DL (ref 1.9–3.5)
GLUCOSE SERPL-MCNC: 109 MG/DL (ref 65–99)
GLUCOSE UR STRIP.AUTO-MCNC: NEGATIVE MG/DL
HCG UR QL: NEGATIVE
HCT VFR BLD AUTO: 42.9 % (ref 37–47)
HGB BLD-MCNC: 14.9 G/DL (ref 12–16)
HYALINE CASTS #/AREA URNS LPF: ABNORMAL /LPF
IMM GRANULOCYTES # BLD AUTO: 0.06 K/UL (ref 0–0.11)
IMM GRANULOCYTES NFR BLD AUTO: 0.5 % (ref 0–0.9)
KETONES UR STRIP.AUTO-MCNC: ABNORMAL MG/DL
LEUKOCYTE ESTERASE UR QL STRIP.AUTO: NEGATIVE
LIPASE SERPL-CCNC: 3 U/L (ref 11–82)
LYMPHOCYTES # BLD AUTO: 1.53 K/UL (ref 1–4.8)
LYMPHOCYTES NFR BLD: 13.6 % (ref 22–41)
MCH RBC QN AUTO: 32.6 PG (ref 27–33)
MCHC RBC AUTO-ENTMCNC: 34.7 G/DL (ref 33.6–35)
MCV RBC AUTO: 93.9 FL (ref 81.4–97.8)
METHADONE UR QL SCN: NEGATIVE
MICRO URNS: ABNORMAL
MONOCYTES # BLD AUTO: 0.8 K/UL (ref 0–0.85)
MONOCYTES NFR BLD AUTO: 7.1 % (ref 0–13.4)
NEUTROPHILS # BLD AUTO: 8.77 K/UL (ref 2–7.15)
NEUTROPHILS NFR BLD: 78.2 % (ref 44–72)
NITRITE UR QL STRIP.AUTO: NEGATIVE
NRBC # BLD AUTO: 0 K/UL
NRBC BLD-RTO: 0 /100 WBC
OPIATES UR QL SCN: NEGATIVE
OXYCODONE UR QL SCN: NEGATIVE
PCP UR QL SCN: NEGATIVE
PH UR STRIP.AUTO: 5 [PH]
PLATELET # BLD AUTO: 211 K/UL (ref 164–446)
PMV BLD AUTO: 10.7 FL (ref 9–12.9)
POTASSIUM SERPL-SCNC: 4 MMOL/L (ref 3.6–5.5)
PROPOXYPH UR QL SCN: NEGATIVE
PROT SERPL-MCNC: 7.7 G/DL (ref 6–8.2)
PROT UR QL STRIP: 100 MG/DL
RBC # BLD AUTO: 4.57 M/UL (ref 4.2–5.4)
RBC # URNS HPF: ABNORMAL /HPF
RBC UR QL AUTO: ABNORMAL
SODIUM SERPL-SCNC: 141 MMOL/L (ref 135–145)
SP GR UR REFRACTOMETRY: 1.03
SP GR UR STRIP.AUTO: 1.03
UROBILINOGEN UR STRIP.AUTO-MCNC: 1 MG/DL
WBC # BLD AUTO: 11.2 K/UL (ref 4.8–10.8)
WBC #/AREA URNS HPF: ABNORMAL /HPF

## 2018-05-12 PROCEDURE — 81001 URINALYSIS AUTO W/SCOPE: CPT

## 2018-05-12 PROCEDURE — 99284 EMERGENCY DEPT VISIT MOD MDM: CPT

## 2018-05-12 PROCEDURE — 80053 COMPREHEN METABOLIC PANEL: CPT

## 2018-05-12 PROCEDURE — 81025 URINE PREGNANCY TEST: CPT

## 2018-05-12 PROCEDURE — 80307 DRUG TEST PRSMV CHEM ANLYZR: CPT

## 2018-05-12 PROCEDURE — 85025 COMPLETE CBC W/AUTO DIFF WBC: CPT

## 2018-05-12 PROCEDURE — 83690 ASSAY OF LIPASE: CPT

## 2018-05-12 RX ORDER — LORAZEPAM 2 MG/ML
INJECTION INTRAMUSCULAR
Status: DISCONTINUED
Start: 2018-05-12 | End: 2018-05-12 | Stop reason: HOSPADM

## 2018-05-12 ASSESSMENT — ENCOUNTER SYMPTOMS
NAUSEA: 1
SPUTUM PRODUCTION: 0
ABDOMINAL PAIN: 1
FEVER: 0

## 2018-05-12 NOTE — ED NOTES
"Pt sitting at end of bed, alert/oriented ,  States\" she has to go to the bathroom\" , explained to pt the need for bedpan due to \" seizures\" , pt states \" she is fine to walk\" , ERP aware, explained to pt we would use a wheelchair for safety, pt up out of bed, not waiting , pt ambulatory with ER tech at standby   "

## 2018-05-12 NOTE — ED TRIAGE NOTES
"Pt BIB [facility] , pt presents \" with seizure -like activity \" up on arrival , seizures noted to be very rhythmic in nature\" , ERP at bedside , ERP assessing pt , pt converses with staff immediately following seizure, no incontinence or oral trauma noted   "

## 2018-05-12 NOTE — ED NOTES
"Pt with \" continued seizure like activity\" , yelling at staff \" while seizing\" , pt demanding pain medication, explained to pt she would not be receiving narcotic medications, pt states \" fuck you , I'm leaving , pt continues to intermittently \" seize\" while yelling at staff, security at bedside , Charge RN Elisabeth butt and at bedside . Dr Masood butt , pt's IV dc'd  with catheter intact, pt ambulatory out of ER , gait steady , pt yelling at security to \" fuck off and die\" , pt escorted out of ER   "

## 2018-05-12 NOTE — ED NOTES
This RN and Milagros Lopez RN to waste Ativan 2mg/1ml , this RN accidentally hit return , return bin opened , no vial returned, put in sharps container and wasted, witnessed by Cale JACKSON. Spoke with [employer]

## 2018-05-12 NOTE — ED PROVIDER NOTES
"ED Provider Note    ED Provider Note    Primary care provider: Pcp Not In Computer  Means of arrival: EMS  History obtained from: Patient  History limited by: None    CHIEF COMPLAINT  Chief Complaint   Patient presents with   • Seizure       HPI  Rupa Yang is a 27 y.o. female who presents to the Emergency Department via EMS for chief complaint of a seizure.  Patient states her recent health has been \"crappy\".  She complains of abdominal pain.  She denies fever.  No note of vomiting or diarrhea.  Per EMS, she had multiple seizures in route.  She was given a total of 7 mg of Versed.    REVIEW OF SYSTEMS  Review of Systems   Constitutional: Negative for fever.   HENT: Negative for congestion.    Respiratory: Negative for sputum production.    Gastrointestinal: Positive for abdominal pain and nausea.   Genitourinary: Negative for dysuria.       PAST MEDICAL HISTORY   has a past medical history of Arrhythmia; Dental disorder; Eczema; Gastroenteritis; Heart burn; Indigestion; Other specified disorder of intestines; Pain (02-24-15); Scoliosis; and Substance abuse.    SURGICAL HISTORY   has a past surgical history that includes primary c section (11/27/2013) and earl by laparoscopy (3/3/2015).    SOCIAL HISTORY  Social History   Substance Use Topics   • Smoking status: Current Every Day Smoker     Packs/day: 0.50     Types: Cigarettes   • Smokeless tobacco: Never Used   • Alcohol use No      History   Drug Use No     Comment: in past meth, cocaine, weed, states last used \"a long time ago\"       FAMILY HISTORY  Family History   Problem Relation Age of Onset   • Alcohol/Drug Mother      drugs   • Diabetes Mother      NIDDM   • Alcohol/Drug Father      drugs   • Alcohol/Drug Brother    • Hypertension Maternal Aunt    • Diabetes Maternal Aunt      IDDM   • Diabetes Maternal Grandmother      IDDM   • Diabetes Maternal Grandfather      IDDM   • Cancer Paternal Grandmother      breast   • Hypertension Paternal Grandmother " "       CURRENT MEDICATIONS  Home Medications    **Home medications have not yet been reviewed for this encounter**         ALLERGIES  Allergies   Allergen Reactions   • Divalproex Sodium Hives and Rash         • Risperidone Hives and Rash         • Sertraline Hcl [Zoloft] Hives and Rash         • Tegretol [Carbamazepine] Hives and Rash             PHYSICAL EXAM  VITAL SIGNS: /89   Pulse 83   Temp 36.9 °C (98.4 °F)   Resp (!) 23   Ht 1.575 m (5' 2\")   Wt 61 kg (134 lb 7.7 oz)   SpO2 93%   BMI 24.60 kg/m²   Vitals reviewed.  Constitutional: Patient is oriented to person, place, and time. Appears well-developed and well-nourished.   Head: Normocephalic and atraumatic.   Ears: Normal external ears bilaterally.   Mouth/Throat: Oropharynx is clear and moist  Eyes: Conjunctivae are normal. Pupils are equal, round, and reactive to light.   Neck: Normal range of motion. Neck supple.  Cardiovascular: Normal rate, regular rhythm and normal heart sounds. Normal peripheral pulses.  Pulmonary/Chest: Effort normal and breath sounds normal. No respiratory distress, no wheezes, rhonchi, or rales.  Abdominal: Soft. Bowel sounds are normal. There is diffuse tenderness. No rebound or guarding, or peritoneal signs.   Musculoskeletal: No edema and no tenderness.   Lymphadenopathy: No cervical adenopathy.   Neurological: No focal deficits noted, assessment difficult secondary to patient cooperation.  Moves all extremities.  Normal muscle tone..   Skin: Skin is warm and dry. No erythema. No pallor.   Psychiatric: Patient has a normal mood and affect.     LABS  Results for orders placed or performed during the hospital encounter of 05/12/18   CBC WITH DIFFERENTIAL   Result Value Ref Range    WBC 11.2 (H) 4.8 - 10.8 K/uL    RBC 4.57 4.20 - 5.40 M/uL    Hemoglobin 14.9 12.0 - 16.0 g/dL    Hematocrit 42.9 37.0 - 47.0 %    MCV 93.9 81.4 - 97.8 fL    MCH 32.6 27.0 - 33.0 pg    MCHC 34.7 33.6 - 35.0 g/dL    RDW 43.1 35.9 - 50.0 fL    " Platelet Count 211 164 - 446 K/uL    MPV 10.7 9.0 - 12.9 fL    Neutrophils-Polys 78.20 (H) 44.00 - 72.00 %    Lymphocytes 13.60 (L) 22.00 - 41.00 %    Monocytes 7.10 0.00 - 13.40 %    Eosinophils 0.30 0.00 - 6.90 %    Basophils 0.30 0.00 - 1.80 %    Immature Granulocytes 0.50 0.00 - 0.90 %    Nucleated RBC 0.00 /100 WBC    Neutrophils (Absolute) 8.77 (H) 2.00 - 7.15 K/uL    Lymphs (Absolute) 1.53 1.00 - 4.80 K/uL    Monos (Absolute) 0.80 0.00 - 0.85 K/uL    Eos (Absolute) 0.03 0.00 - 0.51 K/uL    Baso (Absolute) 0.03 0.00 - 0.12 K/uL    Immature Granulocytes (abs) 0.06 0.00 - 0.11 K/uL    NRBC (Absolute) 0.00 K/uL   COMP METABOLIC PANEL   Result Value Ref Range    Sodium 141 135 - 145 mmol/L    Potassium 4.0 3.6 - 5.5 mmol/L    Chloride 108 96 - 112 mmol/L    Co2 23 20 - 33 mmol/L    Anion Gap 10.0 0.0 - 11.9    Glucose 109 (H) 65 - 99 mg/dL    Bun 21 8 - 22 mg/dL    Creatinine 0.72 0.50 - 1.40 mg/dL    Calcium 10.4 8.5 - 10.5 mg/dL    AST(SGOT) 16 12 - 45 U/L    ALT(SGPT) 14 2 - 50 U/L    Alkaline Phosphatase 78 30 - 99 U/L    Total Bilirubin 0.7 0.1 - 1.5 mg/dL    Albumin 4.9 3.2 - 4.9 g/dL    Total Protein 7.7 6.0 - 8.2 g/dL    Globulin 2.8 1.9 - 3.5 g/dL    A-G Ratio 1.8 g/dL   LIPASE   Result Value Ref Range    Lipase 3 (L) 11 - 82 U/L   URINE DRUG SCREEN (TRIAGE)   Result Value Ref Range    Amphetamines Urine Negative Negative    Barbiturates Negative Negative    Benzodiazepines Positive (A) Negative    Cocaine Metabolite Negative Negative    Methadone Negative Negative    Opiates Negative Negative    Oxycodone Negative Negative    Phencyclidine -Pcp Negative Negative    Propoxyphene Negative Negative    Cannabinoid Metab Positive (A) Negative   URINALYSIS CULTURE, IF INDICATED   Result Value Ref Range    Color DK Yellow     Character Turbid (A)     Specific Gravity 1.027 <1.035    Ph 5.0 5.0 - 8.0    Glucose Negative Negative mg/dL    Ketones Trace (A) Negative mg/dL    Protein 100 (A) Negative mg/dL     Bilirubin Negative Negative    Urobilinogen, Urine 1.0 Negative    Nitrite Negative Negative    Leukocyte Esterase Negative Negative    Occult Blood Small (A) Negative    Micro Urine Req Microscopic    HCG QUALITATIVE UR   Result Value Ref Range    Beta-Hcg Urine Negative Negative   REFRACTOMETER SG   Result Value Ref Range    Specific Gravity 1.028    URINE MICROSCOPIC (W/UA)   Result Value Ref Range    WBC 2-5 /hpf    RBC 2-5 (A) /hpf    Bacteria Few (A) None /hpf    Epithelial Cells Few /hpf    Hyaline Cast 3-5 (A) /lpf   ESTIMATED GFR   Result Value Ref Range    GFR If African American >60 >60 mL/min/1.73 m 2    GFR If Non African American >60 >60 mL/min/1.73 m 2       All labs reviewed by me.    EKG Interpretation  Interpreted by me    Rhythm: normal sinus   Rate: normal  Axis: normal  Ectopy: none  Conduction: normal  ST Segments: no acute change  T Waves: no acute change  Q Waves: none    Clinical Impression: no acute changes and normal EKG    RADIOLOGY  No orders to display     The radiologist's interpretation of all radiological studies have been reviewed by me.    COURSE & MEDICAL DECISION MAKING  Pertinent Labs & Imaging studies reviewed. (See chart for details)    Obtained and reviewed past medical records.  She has 3 visits from March of this year.  She is noted to have a history of drug-seeking behavior.  History of pseudoseizures.  She signed out AGAINST MEDICAL ADVICE in the past.    7:40 AM - Patient seen and examined at bedside, upon EMS arrival.  As I entered the room, patient is lying on the gurney, she is lying on her left side.  She is demonstrating rhythmic seizure-like activity.  Last for several seconds then I will stop.  The patient is able to talk and give some history between these episodes.  I held her hand above her face, she does hold it there for a brief second, before allowing it to fall onto her lower face.  There is no trauma to the face noted.  She lives this to happen gently.  Do  "not suspect, that this activity is true seizure activity.  He does have a history of hypokalemia.  Will obtain labs and assess electrolyte abnormalities.  In addition, she complains of abdominal pain, she does not have a surgical abdomen.  Abdomen is soft.  There is no grimace with palpation.  No peritoneal signs.  However, will hold off on further administration of medications, unless it is medically indicated.    The differential diagnoses include but are not limited to: Pseudoseizure, electrolyte disturbance, exacerbation of chronic abdominal pain, pregnancy.    7:54 AM patient now saying that she needs to go to the bathroom.  She is awake and alert now, sitting up, stating that she is perfectly able to walk to the bathroom.  She is noted walking to the bathroom with assistance to prevent fall.    Called to the bedside as the patient started being belligerent, agitated and cursing at the nurse, per nurse report, \"demanding narcotics\".  The nurse tried to explain to the patient we are waiting for labs.  Patient apparently stated that we will cannot find anything and that if we were going to do anything she was going to leave.  I came to the bedside, just a few minutes after called by nurse but the patient had already left the ED.    8:53 AM at the time the patient left the ED, only her urine analysis was back was reviewed.  She is not pregnant.  Remainder of labs were overall unrevealing.  She does have a mild elevation in her white blood cell count of 11.2.  Lipase normal.  Glucose slightly elevated her drug screen positive for marijuana and benzodiazepines which she did receive by EMS prior to arrival.      FINAL IMPRESSION  1. Pseudoseizure    2. Chronic abdominal pain                     "

## 2019-10-13 ENCOUNTER — APPOINTMENT (OUTPATIENT)
Dept: RADIOLOGY | Facility: MEDICAL CENTER | Age: 29
DRG: 641 | End: 2019-10-13
Attending: EMERGENCY MEDICINE

## 2019-10-13 ENCOUNTER — HOSPITAL ENCOUNTER (INPATIENT)
Facility: MEDICAL CENTER | Age: 29
LOS: 1 days | DRG: 641 | End: 2019-10-14
Attending: EMERGENCY MEDICINE | Admitting: INTERNAL MEDICINE

## 2019-10-13 DIAGNOSIS — E86.0 DEHYDRATION: ICD-10-CM

## 2019-10-13 DIAGNOSIS — E87.6 HYPOKALEMIA: ICD-10-CM

## 2019-10-13 DIAGNOSIS — R11.2 NON-INTRACTABLE VOMITING WITH NAUSEA, UNSPECIFIED VOMITING TYPE: ICD-10-CM

## 2019-10-13 DIAGNOSIS — N17.9 ACUTE RENAL FAILURE, UNSPECIFIED ACUTE RENAL FAILURE TYPE (HCC): ICD-10-CM

## 2019-10-13 PROBLEM — E87.3 METABOLIC ALKALOSIS: Status: ACTIVE | Noted: 2019-10-13

## 2019-10-13 PROBLEM — R56.9 SEIZURE (HCC): Status: RESOLVED | Noted: 2017-04-29 | Resolved: 2019-10-13

## 2019-10-13 PROBLEM — E87.29 METABOLIC ACIDOSIS, INCREASED ANION GAP: Status: ACTIVE | Noted: 2019-10-13

## 2019-10-13 PROBLEM — F12.90 CANNABINOID HYPEREMESIS SYNDROME: Status: ACTIVE | Noted: 2019-10-13

## 2019-10-13 LAB
ALBUMIN SERPL BCP-MCNC: 5.5 G/DL (ref 3.2–4.9)
ALBUMIN/GLOB SERPL: 1.5 G/DL
ALP SERPL-CCNC: 90 U/L (ref 30–99)
ALT SERPL-CCNC: 23 U/L (ref 2–50)
AMPHET UR QL SCN: NEGATIVE
ANION GAP SERPL CALC-SCNC: 10 MMOL/L (ref 0–11.9)
ANION GAP SERPL CALC-SCNC: 14 MMOL/L (ref 0–11.9)
ANION GAP SERPL CALC-SCNC: 32 MMOL/L (ref 0–11.9)
AST SERPL-CCNC: 29 U/L (ref 12–45)
B-OH-BUTYR SERPL-MCNC: 1.66 MMOL/L (ref 0.02–0.27)
BARBITURATES UR QL SCN: NEGATIVE
BASOPHILS # BLD AUTO: 0.2 % (ref 0–1.8)
BASOPHILS # BLD: 0.03 K/UL (ref 0–0.12)
BENZODIAZ UR QL SCN: POSITIVE
BILIRUB SERPL-MCNC: 1 MG/DL (ref 0.1–1.5)
BUN SERPL-MCNC: 17 MG/DL (ref 8–22)
BUN SERPL-MCNC: 33 MG/DL (ref 8–22)
BUN SERPL-MCNC: 49 MG/DL (ref 8–22)
BZE UR QL SCN: NEGATIVE
CALCIUM SERPL-MCNC: 10.3 MG/DL (ref 8.5–10.5)
CALCIUM SERPL-MCNC: 7.8 MG/DL (ref 8.5–10.5)
CALCIUM SERPL-MCNC: 8.2 MG/DL (ref 8.5–10.5)
CANNABINOIDS UR QL SCN: POSITIVE
CHLORIDE SERPL-SCNC: 100 MMOL/L (ref 96–112)
CHLORIDE SERPL-SCNC: 76 MMOL/L (ref 96–112)
CHLORIDE SERPL-SCNC: 91 MMOL/L (ref 96–112)
CK SERPL-CCNC: 354 U/L (ref 0–154)
CO2 SERPL-SCNC: 25 MMOL/L (ref 20–33)
CO2 SERPL-SCNC: 27 MMOL/L (ref 20–33)
CO2 SERPL-SCNC: 28 MMOL/L (ref 20–33)
CREAT SERPL-MCNC: 0.62 MG/DL (ref 0.5–1.4)
CREAT SERPL-MCNC: 1.02 MG/DL (ref 0.5–1.4)
CREAT SERPL-MCNC: 2.5 MG/DL (ref 0.5–1.4)
CREAT UR-MCNC: 68.7 MG/DL
EKG IMPRESSION: NORMAL
EKG IMPRESSION: NORMAL
EOSINOPHIL # BLD AUTO: 0.02 K/UL (ref 0–0.51)
EOSINOPHIL NFR BLD: 0.1 % (ref 0–6.9)
ERYTHROCYTE [DISTWIDTH] IN BLOOD BY AUTOMATED COUNT: 41.1 FL (ref 35.9–50)
ETHANOL BLD-MCNC: 0 G/DL
FLUAV RNA SPEC QL NAA+PROBE: NEGATIVE
FLUBV RNA SPEC QL NAA+PROBE: NEGATIVE
GLOBULIN SER CALC-MCNC: 3.7 G/DL (ref 1.9–3.5)
GLUCOSE SERPL-MCNC: 103 MG/DL (ref 65–99)
GLUCOSE SERPL-MCNC: 75 MG/DL (ref 65–99)
GLUCOSE SERPL-MCNC: 84 MG/DL (ref 65–99)
HCG SERPL QL: NEGATIVE
HCT VFR BLD AUTO: 44.7 % (ref 37–47)
HGB BLD-MCNC: 15.6 G/DL (ref 12–16)
IMM GRANULOCYTES # BLD AUTO: 0.07 K/UL (ref 0–0.11)
IMM GRANULOCYTES NFR BLD AUTO: 0.4 % (ref 0–0.9)
LACTATE BLD-SCNC: 1.3 MMOL/L (ref 0.5–2)
LIPASE SERPL-CCNC: 3 U/L (ref 11–82)
LYMPHOCYTES # BLD AUTO: 1.42 K/UL (ref 1–4.8)
LYMPHOCYTES NFR BLD: 8.5 % (ref 22–41)
MAGNESIUM SERPL-MCNC: 2 MG/DL (ref 1.5–2.5)
MCH RBC QN AUTO: 32.1 PG (ref 27–33)
MCHC RBC AUTO-ENTMCNC: 34.9 G/DL (ref 33.6–35)
MCV RBC AUTO: 92 FL (ref 81.4–97.8)
METHADONE UR QL SCN: NEGATIVE
MONOCYTES # BLD AUTO: 1.58 K/UL (ref 0–0.85)
MONOCYTES NFR BLD AUTO: 9.4 % (ref 0–13.4)
NEUTROPHILS # BLD AUTO: 13.63 K/UL (ref 2–7.15)
NEUTROPHILS NFR BLD: 81.4 % (ref 44–72)
NRBC # BLD AUTO: 0 K/UL
NRBC BLD-RTO: 0 /100 WBC
OPIATES UR QL SCN: NEGATIVE
OSMOLALITY SERPL: 286 MOSM/KG H2O (ref 278–298)
OXYCODONE UR QL SCN: NEGATIVE
PCP UR QL SCN: NEGATIVE
PLATELET # BLD AUTO: 256 K/UL (ref 164–446)
PMV BLD AUTO: 11.7 FL (ref 9–12.9)
POTASSIUM SERPL-SCNC: 2.7 MMOL/L (ref 3.6–5.5)
POTASSIUM SERPL-SCNC: 2.8 MMOL/L (ref 3.6–5.5)
POTASSIUM SERPL-SCNC: 3.5 MMOL/L (ref 3.6–5.5)
PROPOXYPH UR QL SCN: NEGATIVE
PROT SERPL-MCNC: 9.2 G/DL (ref 6–8.2)
RBC # BLD AUTO: 4.86 M/UL (ref 4.2–5.4)
SODIUM SERPL-SCNC: 133 MMOL/L (ref 135–145)
SODIUM SERPL-SCNC: 133 MMOL/L (ref 135–145)
SODIUM SERPL-SCNC: 137 MMOL/L (ref 135–145)
SODIUM UR-SCNC: 99 MMOL/L
TROPONIN T SERPL-MCNC: <6 NG/L (ref 6–19)
WBC # BLD AUTO: 16.8 K/UL (ref 4.8–10.8)

## 2019-10-13 PROCEDURE — 700101 HCHG RX REV CODE 250: Performed by: STUDENT IN AN ORGANIZED HEALTH CARE EDUCATION/TRAINING PROGRAM

## 2019-10-13 PROCEDURE — 93005 ELECTROCARDIOGRAM TRACING: CPT | Performed by: STUDENT IN AN ORGANIZED HEALTH CARE EDUCATION/TRAINING PROGRAM

## 2019-10-13 PROCEDURE — 99285 EMERGENCY DEPT VISIT HI MDM: CPT

## 2019-10-13 PROCEDURE — 36415 COLL VENOUS BLD VENIPUNCTURE: CPT

## 2019-10-13 PROCEDURE — 82570 ASSAY OF URINE CREATININE: CPT

## 2019-10-13 PROCEDURE — 700105 HCHG RX REV CODE 258: Performed by: EMERGENCY MEDICINE

## 2019-10-13 PROCEDURE — 87040 BLOOD CULTURE FOR BACTERIA: CPT

## 2019-10-13 PROCEDURE — 99223 1ST HOSP IP/OBS HIGH 75: CPT | Mod: GC | Performed by: INTERNAL MEDICINE

## 2019-10-13 PROCEDURE — 700105 HCHG RX REV CODE 258

## 2019-10-13 PROCEDURE — 83605 ASSAY OF LACTIC ACID: CPT

## 2019-10-13 PROCEDURE — 82550 ASSAY OF CK (CPK): CPT

## 2019-10-13 PROCEDURE — 770020 HCHG ROOM/CARE - TELE (206)

## 2019-10-13 PROCEDURE — 82010 KETONE BODYS QUAN: CPT

## 2019-10-13 PROCEDURE — 700111 HCHG RX REV CODE 636 W/ 250 OVERRIDE (IP): Performed by: STUDENT IN AN ORGANIZED HEALTH CARE EDUCATION/TRAINING PROGRAM

## 2019-10-13 PROCEDURE — 700102 HCHG RX REV CODE 250 W/ 637 OVERRIDE(OP): Performed by: STUDENT IN AN ORGANIZED HEALTH CARE EDUCATION/TRAINING PROGRAM

## 2019-10-13 PROCEDURE — 96365 THER/PROPH/DIAG IV INF INIT: CPT

## 2019-10-13 PROCEDURE — 700102 HCHG RX REV CODE 250 W/ 637 OVERRIDE(OP): Performed by: INTERNAL MEDICINE

## 2019-10-13 PROCEDURE — 83690 ASSAY OF LIPASE: CPT

## 2019-10-13 PROCEDURE — 85025 COMPLETE CBC W/AUTO DIFF WBC: CPT

## 2019-10-13 PROCEDURE — 83735 ASSAY OF MAGNESIUM: CPT

## 2019-10-13 PROCEDURE — 84703 CHORIONIC GONADOTROPIN ASSAY: CPT

## 2019-10-13 PROCEDURE — 96366 THER/PROPH/DIAG IV INF ADDON: CPT

## 2019-10-13 PROCEDURE — 80053 COMPREHEN METABOLIC PANEL: CPT

## 2019-10-13 PROCEDURE — 80048 BASIC METABOLIC PNL TOTAL CA: CPT

## 2019-10-13 PROCEDURE — 700111 HCHG RX REV CODE 636 W/ 250 OVERRIDE (IP): Performed by: EMERGENCY MEDICINE

## 2019-10-13 PROCEDURE — 96375 TX/PRO/DX INJ NEW DRUG ADDON: CPT

## 2019-10-13 PROCEDURE — 700111 HCHG RX REV CODE 636 W/ 250 OVERRIDE (IP)

## 2019-10-13 PROCEDURE — 80307 DRUG TEST PRSMV CHEM ANLYZR: CPT

## 2019-10-13 PROCEDURE — 87502 INFLUENZA DNA AMP PROBE: CPT

## 2019-10-13 PROCEDURE — A9270 NON-COVERED ITEM OR SERVICE: HCPCS | Performed by: STUDENT IN AN ORGANIZED HEALTH CARE EDUCATION/TRAINING PROGRAM

## 2019-10-13 PROCEDURE — 84300 ASSAY OF URINE SODIUM: CPT

## 2019-10-13 PROCEDURE — 83930 ASSAY OF BLOOD OSMOLALITY: CPT

## 2019-10-13 PROCEDURE — 70450 CT HEAD/BRAIN W/O DYE: CPT

## 2019-10-13 PROCEDURE — 84484 ASSAY OF TROPONIN QUANT: CPT

## 2019-10-13 PROCEDURE — A9270 NON-COVERED ITEM OR SERVICE: HCPCS | Performed by: INTERNAL MEDICINE

## 2019-10-13 RX ORDER — POTASSIUM CHLORIDE 20 MEQ/1
40 TABLET, EXTENDED RELEASE ORAL ONCE
Status: COMPLETED | OUTPATIENT
Start: 2019-10-13 | End: 2019-10-13

## 2019-10-13 RX ORDER — SODIUM CHLORIDE 9 MG/ML
500 INJECTION, SOLUTION INTRAVENOUS ONCE
Status: COMPLETED | OUTPATIENT
Start: 2019-10-13 | End: 2019-10-13

## 2019-10-13 RX ORDER — LORAZEPAM 2 MG/ML
INJECTION INTRAMUSCULAR
Status: COMPLETED
Start: 2019-10-13 | End: 2019-10-13

## 2019-10-13 RX ORDER — POTASSIUM CHLORIDE 20 MEQ/1
40 TABLET, EXTENDED RELEASE ORAL ONCE
Status: DISCONTINUED | OUTPATIENT
Start: 2019-10-13 | End: 2019-10-13

## 2019-10-13 RX ORDER — CAPSAICIN 0.025 %
CREAM (GRAM) TOPICAL 3 TIMES DAILY
Status: DISCONTINUED | OUTPATIENT
Start: 2019-10-13 | End: 2019-10-14 | Stop reason: HOSPADM

## 2019-10-13 RX ORDER — POTASSIUM CHLORIDE 7.45 MG/ML
10 INJECTION INTRAVENOUS
Status: COMPLETED | OUTPATIENT
Start: 2019-10-13 | End: 2019-10-13

## 2019-10-13 RX ORDER — PROCHLORPERAZINE EDISYLATE 5 MG/ML
5-10 INJECTION INTRAMUSCULAR; INTRAVENOUS EVERY 4 HOURS PRN
Status: DISCONTINUED | OUTPATIENT
Start: 2019-10-13 | End: 2019-10-14 | Stop reason: HOSPADM

## 2019-10-13 RX ORDER — DEXTROSE AND SODIUM CHLORIDE 5; .9 G/100ML; G/100ML
INJECTION, SOLUTION INTRAVENOUS
Status: COMPLETED
Start: 2019-10-13 | End: 2019-10-13

## 2019-10-13 RX ORDER — AMOXICILLIN 250 MG
2 CAPSULE ORAL 2 TIMES DAILY
Status: DISCONTINUED | OUTPATIENT
Start: 2019-10-13 | End: 2019-10-14 | Stop reason: HOSPADM

## 2019-10-13 RX ORDER — POLYETHYLENE GLYCOL 3350 17 G/17G
1 POWDER, FOR SOLUTION ORAL
Status: DISCONTINUED | OUTPATIENT
Start: 2019-10-13 | End: 2019-10-14 | Stop reason: HOSPADM

## 2019-10-13 RX ORDER — ACETAMINOPHEN 500 MG
TABLET ORAL EVERY 6 HOURS PRN
COMMUNITY

## 2019-10-13 RX ORDER — SODIUM CHLORIDE 9 MG/ML
INJECTION, SOLUTION INTRAVENOUS
Status: ACTIVE
Start: 2019-10-13 | End: 2019-10-14

## 2019-10-13 RX ORDER — SODIUM CHLORIDE 9 MG/ML
INJECTION, SOLUTION INTRAVENOUS
Status: COMPLETED
Start: 2019-10-13 | End: 2019-10-13

## 2019-10-13 RX ORDER — OMEPRAZOLE 20 MG/1
20 CAPSULE, DELAYED RELEASE ORAL 2 TIMES DAILY
Status: DISCONTINUED | OUTPATIENT
Start: 2019-10-13 | End: 2019-10-14 | Stop reason: HOSPADM

## 2019-10-13 RX ORDER — PROMETHAZINE HYDROCHLORIDE 25 MG/1
12.5-25 SUPPOSITORY RECTAL EVERY 4 HOURS PRN
Status: DISCONTINUED | OUTPATIENT
Start: 2019-10-13 | End: 2019-10-14 | Stop reason: HOSPADM

## 2019-10-13 RX ORDER — POTASSIUM CHLORIDE 7.45 MG/ML
10 INJECTION INTRAVENOUS
Status: DISPENSED | OUTPATIENT
Start: 2019-10-13 | End: 2019-10-13

## 2019-10-13 RX ORDER — PROMETHAZINE HYDROCHLORIDE 25 MG/1
12.5-25 TABLET ORAL EVERY 4 HOURS PRN
Status: DISCONTINUED | OUTPATIENT
Start: 2019-10-13 | End: 2019-10-14 | Stop reason: HOSPADM

## 2019-10-13 RX ORDER — POTASSIUM CHLORIDE 20 MEQ/1
20 TABLET, EXTENDED RELEASE ORAL ONCE
Status: COMPLETED | OUTPATIENT
Start: 2019-10-13 | End: 2019-10-13

## 2019-10-13 RX ORDER — LORAZEPAM 2 MG/ML
1 INJECTION INTRAMUSCULAR ONCE
Status: COMPLETED | OUTPATIENT
Start: 2019-10-13 | End: 2019-10-13

## 2019-10-13 RX ORDER — SODIUM CHLORIDE 9 MG/ML
1000 INJECTION, SOLUTION INTRAVENOUS ONCE
Status: COMPLETED | OUTPATIENT
Start: 2019-10-13 | End: 2019-10-13

## 2019-10-13 RX ORDER — BISACODYL 10 MG
10 SUPPOSITORY, RECTAL RECTAL
Status: DISCONTINUED | OUTPATIENT
Start: 2019-10-13 | End: 2019-10-14 | Stop reason: HOSPADM

## 2019-10-13 RX ORDER — LORAZEPAM 2 MG/ML
2 INJECTION INTRAMUSCULAR ONCE
Status: COMPLETED | OUTPATIENT
Start: 2019-10-13 | End: 2019-10-13

## 2019-10-13 RX ORDER — ACETAMINOPHEN 325 MG/1
650 TABLET ORAL EVERY 6 HOURS PRN
Status: DISCONTINUED | OUTPATIENT
Start: 2019-10-13 | End: 2019-10-14 | Stop reason: HOSPADM

## 2019-10-13 RX ORDER — SODIUM CHLORIDE AND POTASSIUM CHLORIDE 300; 900 MG/100ML; MG/100ML
INJECTION, SOLUTION INTRAVENOUS CONTINUOUS
Status: DISCONTINUED | OUTPATIENT
Start: 2019-10-13 | End: 2019-10-14

## 2019-10-13 RX ADMIN — POTASSIUM CHLORIDE AND SODIUM CHLORIDE: 900; 300 INJECTION, SOLUTION INTRAVENOUS at 17:27

## 2019-10-13 RX ADMIN — LORAZEPAM 2 MG: 2 INJECTION INTRAMUSCULAR; INTRAVENOUS at 14:02

## 2019-10-13 RX ADMIN — SODIUM CHLORIDE 500 ML: 9 INJECTION, SOLUTION INTRAVENOUS at 14:06

## 2019-10-13 RX ADMIN — POTASSIUM CHLORIDE 10 MEQ: 7.46 INJECTION, SOLUTION INTRAVENOUS at 21:53

## 2019-10-13 RX ADMIN — LORAZEPAM 2 MG: 2 INJECTION INTRAMUSCULAR at 14:02

## 2019-10-13 RX ADMIN — POTASSIUM CHLORIDE 10 MEQ: 7.46 INJECTION, SOLUTION INTRAVENOUS at 12:08

## 2019-10-13 RX ADMIN — FENTANYL CITRATE 50 MCG: 50 INJECTION INTRAMUSCULAR; INTRAVENOUS at 10:13

## 2019-10-13 RX ADMIN — POTASSIUM CHLORIDE 10 MEQ: 7.46 INJECTION, SOLUTION INTRAVENOUS at 09:28

## 2019-10-13 RX ADMIN — POTASSIUM CHLORIDE 10 MEQ: 7.46 INJECTION, SOLUTION INTRAVENOUS at 20:50

## 2019-10-13 RX ADMIN — POTASSIUM CHLORIDE 10 MEQ: 7.46 INJECTION, SOLUTION INTRAVENOUS at 13:46

## 2019-10-13 RX ADMIN — POTASSIUM CHLORIDE 20 MEQ: 1500 TABLET, EXTENDED RELEASE ORAL at 13:46

## 2019-10-13 RX ADMIN — POTASSIUM CHLORIDE 40 MEQ: 1500 TABLET, EXTENDED RELEASE ORAL at 20:44

## 2019-10-13 RX ADMIN — POTASSIUM CHLORIDE AND SODIUM CHLORIDE: 900; 300 INJECTION, SOLUTION INTRAVENOUS at 21:39

## 2019-10-13 RX ADMIN — LORAZEPAM 1 MG: 2 INJECTION INTRAMUSCULAR; INTRAVENOUS at 08:22

## 2019-10-13 RX ADMIN — OMEPRAZOLE 20 MG: 20 CAPSULE, DELAYED RELEASE ORAL at 20:36

## 2019-10-13 RX ADMIN — SODIUM CHLORIDE 1000 ML: 9 INJECTION, SOLUTION INTRAVENOUS at 08:23

## 2019-10-13 ASSESSMENT — ENCOUNTER SYMPTOMS
DIZZINESS: 1
EYE PAIN: 0
SORE THROAT: 1
ABDOMINAL PAIN: 1
WEIGHT LOSS: 0
NAUSEA: 1
BLOOD IN STOOL: 0
VOMITING: 1
HEADACHES: 1
PALPITATIONS: 0
WEIGHT LOSS: 1
MYALGIAS: 0
HEMOPTYSIS: 0
FEVER: 1
PALPITATIONS: 1
WEAKNESS: 1
CHILLS: 1
BLURRED VISION: 0
DIARRHEA: 1
DIAPHORESIS: 1
COUGH: 1
SHORTNESS OF BREATH: 1
HEARTBURN: 0
DOUBLE VISION: 0

## 2019-10-13 ASSESSMENT — COGNITIVE AND FUNCTIONAL STATUS - GENERAL
CLIMB 3 TO 5 STEPS WITH RAILING: A LOT
DRESSING REGULAR UPPER BODY CLOTHING: A LITTLE
SUGGESTED CMS G CODE MODIFIER MOBILITY: CK
MOVING TO AND FROM BED TO CHAIR: A LITTLE
MOBILITY SCORE: 17
DAILY ACTIVITIY SCORE: 23
WALKING IN HOSPITAL ROOM: A LOT
STANDING UP FROM CHAIR USING ARMS: A LITTLE
SUGGESTED CMS G CODE MODIFIER DAILY ACTIVITY: CI
MOVING FROM LYING ON BACK TO SITTING ON SIDE OF FLAT BED: A LITTLE

## 2019-10-13 ASSESSMENT — PAIN SCALES - WONG BAKER: WONGBAKER_NUMERICALRESPONSE: HURTS A WHOLE LOT

## 2019-10-13 ASSESSMENT — LIFESTYLE VARIABLES: SUBSTANCE_ABUSE: 1

## 2019-10-13 ASSESSMENT — PAIN DESCRIPTION - DESCRIPTORS: DESCRIPTORS: ACHING;CRAMPING;STABBING

## 2019-10-13 NOTE — ASSESSMENT & PLAN NOTE
· Likely secondary to intractable nausea and vomiting, however patient has a hx of chronic hypokalemia so current value of 2.8 may be an acute on chronic problem; she also has hx of VT associated with hypokalemia (with normal TTE in 2017 ruling out underlying cardiac structural issues)  · Pt also with significant CARI with Cr >2 so will be cautious with repletion; she also has intractable n/v and is unsure if she can tolerate fluids (and therefore PO repletion)  · Receiving 10 MeQ x3 K as piggyback in ED; also ordering NS with 40 mEQ for floor. Advancing diet as tolerated; have ordered 20 mEQ PO Kdur and if can tolerate PO will preferably replete by PO (no greater than 10 mEQ/hr average)  · Q4H BMP to follow; we want to replete adequately but without eliciting hyperkalemia  · Getting EKG given hx of VT associated with electrolyte disturbance (normal TTE 2017 so no structural source of VT); admitting to tele  · EKG with borderline prolonged QTc so will continue to replete K and avoid QTc prolonging drugs such as Zofran; troponin also ordered and negative.  · 10/14 Potassium this morning is 4.3. Repeat Bmp: K 3.9 with PO4 1.3; Replenished with IV potassium phosphate 30mmol

## 2019-10-13 NOTE — ASSESSMENT & PLAN NOTE
· Strongly suspect cannabinoid hyperemesis syndrome given patient’s hx of polysubstance abuse (with pos marijuana on prior UDS), multiple admissions during which she was diagnosed with cannabinoid hyperemesis syndrome/cyclic vomiting syndrome, and pt previously counseled not to use marijuana. She reports last marijuana use 1 month ago, but this is questionable as patient has previously denied drug abuse in setting of multi-positive UDS.  · Only item in hx not quite fitting with [facility] would be patient’s report of one episode diarrhea; consider enterocolitis picture? This is doubtful, however; FOBT negative in ED and abdominal exam completely benign.  · IVF at 250/hr (NS + 40 mEQ KCl) and trending BMP; once K repleted will change to NS  · Initially planned to advance to clear liquid; however d/t continued pseudoseizure activity and concern for aspiration will order NGT for now.  · Anti-emetic protocol; holding off on starting zofran for now given borderline prolonged QTc  · Tylenol Q6H PRN for abd pain + topical capsaicin (some evidence to help abd pain in [facility]); will try to avoid narcotic administration to the extent possible in this patient with hx of polysubstance abuse and drug-seeking behavior  · Added Ibuprofen q6h PRN, patient continued to complain of pain so Toradol injection 10mg was given.

## 2019-10-13 NOTE — ASSESSMENT & PLAN NOTE
"· Patient with “full body convulsions” but able to talk during episodes and without post-ictal period, tongue-biting, or incontinence. Additionally, this has been a recurrent problem for patient with multiple Neuro consults in past for workup including in 2015 when video EEG was negative (with two episodes of \"full body shaking\" during the VEEG), nearly definitively ruling out seizure, as well as numerous CTs of the head (including this visit) all of which have been unremarkable.   · Getting CPK but low suspicion it will be elevated  · Will NOT get Neurology consult this visit; will NOT order PRN benzodiazepines. Will recommend referral for cognitive behavioral therapy at discharge. Will communicate likely diagnosis to staff.  · 10/14 Continues to have shaking spells every 5 min but responds adequately while having the episode with no post ictal confusion.        "

## 2019-10-13 NOTE — PROGRESS NOTES
1400 Rapid Response called  1401 Stat Page to Dr. Aguirre, Dr. Doran at bedside for orders  1403 50 sec seizure  1406 Stat page x 2 to Dr. Aguirre, 500 cc bolus given  1408 Stat page 3 to Dr. Aguirre  1412 Dr. Doran completed Rapid

## 2019-10-13 NOTE — ED TRIAGE NOTES
"Pt BIBA from home w/severe abd pain, nausea vomiting, weakness, fatigue chills x2 days. Per pt, similar abdominal pain episodes happen every 2-3 months. During triage pt experienced a 30 second episode of convulsing where pt was withdrawn, rigid and eyes rolled back in head. Pt maintained airway during episode and no post ictal period noted. Pt reports these episodes occur \"due to extreme pain.\" pt now A&OX4 speaking clear sentences.   "

## 2019-10-13 NOTE — ASSESSMENT & PLAN NOTE
· Likely reactive in setting of severe n/v due to inflammatory picture + possible mild hemoconcentration  · SIRS otherwise negative except for very mild tachycardia which is likely secondary to hypovolemia in setting of n/v and which has since resolved with fluids; however will order blood cx x2 to rule out infectious source we do not expect. She has no other focalizing s/s infection except for n/v/d (see intractable n/v, abdominal pain, diarrhea) but abdominal exam is benign.  · 10/14 resolved, WBC 8.8

## 2019-10-13 NOTE — H&P
"      Internal Medicine Admitting History and Physical    Note Author: Shanel Rose M.D.       Name Rupa Yang     1990   Age/Sex 29 y.o. female   MRN [id number]   Code Status FULL     After 5PM or if no immediate response to page, please call for cross-coverage  Attending/Team: Dr. Aguirre/Cornelio See Patient List for primary contact information  Call [phone] to page    1st Call - Day Intern (R1):   N/A 2nd Call - Day Sr. Resident (R2/R3):   Dr. Rose       Chief Complaint:   Recurrent n/v and abdominal pain, \"Seizure like activity\"    HPI:  29F with PMHx of:  - purported hx of endometriosis without hx of ex lap  - psychogenic non-epileptiform seizure activity with negative video EEG (see 2015 video EEG in chart) on prior neurology evaluations; patient refused EEG at later visits including 2017 visit, has never followed up with a neurologist outpatient, and has never been started on AED.  - drug-seeking activity   - AMA discharges  - hypokalemia with previous associated VT requiring cardioversion   - polysubstance abuse (pt previously and currently denies drug use however there is prior UDS pos for benzodiazepines, cannabinoids, opiates, and amphetamines)  - numerous prior admissions for abdominal pain, nausea, and vomiting, previously diagnosed with cannabinoid hyperemesis syndrome/cyclic vomiting syndrome and counseled numerous times to avoid cannabinoids    She presents today for severe abdominal pain, intractable n/v, weakness, fatigue, 1 episode of diarrhea, and chills x2 days. She reports that this is similar to prior episodes which recur every 1-3 months. She also reports some diaphoresis, lighteadedness/dizziness upon standing, and mild cough, as well as some chest pain (\"like an elephant was sitting on my chest\") with SOB this morning only. She has previously had UDS pos for polysubstance abuse though she chronically denies drug use at hospital visits; today she admits to marijuana " "use but states it has been \"a month\" since last use. She denies other drug or alcohol use today.    She was also noted by EMS and EDP to exhibit total body convulsions without typical tonic-clonic movements, post-ictal confusion, or incontinence, and was conscious during the convulsions and able to talk with staff while they were occurring. Notably she carries a diagnosis of psychogenic non-epileptiform seizure activity with video EEG in 2015 negative for seizure activity despite being performed while \"2 episodes of total body shaking\" were taking place.    ED workup notable for:   - CMP with mild hypoNa to 133, severe hypoK to 2.8, severe hypoCl to 76, normal bicarb at 25, CARI with BUN/Cr 49/2.5, and raised AG to 32  - VSS with only very mild tachycardia to 102; afebrile  - CBC with leukocytosis to 16.8 and normal Hgb/plt  - lactic acid wnl  - troponin wnl  - EKG with borderline QTc prolongation to 472    Review of Systems   Constitutional: Positive for chills, diaphoresis, fever and malaise/fatigue. Negative for weight loss.   Eyes: Negative for blurred vision and double vision.   Respiratory: Positive for cough and shortness of breath.    Cardiovascular: Positive for chest pain. Negative for palpitations and leg swelling.   Gastrointestinal: Positive for abdominal pain, diarrhea, nausea and vomiting. Negative for blood in stool and melena.   Musculoskeletal: Negative for joint pain and myalgias.   Neurological: Positive for dizziness and weakness.   Psychiatric/Behavioral: Positive for substance abuse.           Past Medical History (Chronic medical problem, known complications and current treatment)     See detailed PMHx in HPI    Past Surgical History:  Past Surgical History:   Procedure Laterality Date   • JUDY BY LAPAROSCOPY  3/3/2015    Procedure: JUDY BY LAPAROSCOPY;  Surgeon: Aftab Churchill M.D.;  Location: SURGERY [facility];  Service:    • PRIMARY C SECTION  11/27/2013    Performed by Haider FREIRE" "LASHAUN Garcia at LAB[facility] DELIVERY       Current Outpatient Medications:  Home Medications    **Home medications have not yet been reviewed for this encounter**     Patient does not take any home medications    Medication Allergy/Sensitivities:  Allergies   Allergen Reactions   • Divalproex Sodium Hives and Rash         • Risperidone Hives and Rash         • Sertraline Hcl [Zoloft] Hives and Rash         • Tegretol [Carbamazepine] Hives and Rash               Family History (mandatory)   Family History   Problem Relation Age of Onset   • Alcohol/Drug Mother         drugs   • Diabetes Mother         NIDDM   • Alcohol/Drug Father         drugs   • Alcohol/Drug Brother    • Hypertension Maternal Aunt    • Diabetes Maternal Aunt         IDDM   • Diabetes Maternal Grandmother         IDDM   • Diabetes Maternal Grandfather         IDDM   • Cancer Paternal Grandmother         breast   • Hypertension Paternal Grandmother    FHX reviewed: she reports CVA and MI in mother, HCL in father    Social History (mandatory)   Social History     Socioeconomic History   • Marital status:      Spouse name: Not on file   • Number of children: Not on file   • Years of education: Not on file   • Highest education level: Not on file   Occupational History   • Not on file   Social Needs   • Financial resource strain: Not on file   • Food insecurity:     Worry: Not on file     Inability: Not on file   • Transportation needs:     Medical: Not on file     Non-medical: Not on file   Tobacco Use   • Smoking status: Current Every Day Smoker     Packs/day: 0.50     Types: Cigarettes   • Smokeless tobacco: Never Used   Substance and Sexual Activity   • Alcohol use: No   • Drug use: No     Comment: in past meth, cocaine, weed, states last used \"a long time ago\"   • Sexual activity: Yes     Partners: Male     Comment: planned pregnancy FOB involved   Lifestyle   • Physical activity:     Days per week: Not on file     Minutes per session: Not on " "file   • Stress: Not on file   Relationships   • Social connections:     Talks on phone: Not on file     Gets together: Not on file     Attends Druze service: Not on file     Active member of club or organization: Not on file     Attends meetings of clubs or organizations: Not on file     Relationship status: Not on file   • Intimate partner violence:     Fear of current or ex partner: Not on file     Emotionally abused: Not on file     Physically abused: Not on file     Forced sexual activity: Not on file   Other Topics Concern   • Not on file   Social History Narrative   • Not on file   SHx reviewed: she denies alcohol or drug use except for marijuana (last reported use 1 month ago)    Living situation: lives in apartment with a friend  PCP : Pcp Not In Computer    Physical Exam     Vitals:    10/13/19 0810 10/13/19 0815 10/13/19 0915 10/13/19 1000   BP:  111/57 104/74 119/75   Pulse:  99 (!) 102 74   Resp:  18  16   Temp:    36.5 °C (97.7 °F)   TempSrc:    Oral   SpO2:   96% 96%   Weight: 63.5 kg (140 lb)      Height: 1.626 m (5' 4\")        Body mass index is 24.03 kg/m².  O2 therapy: Pulse Oximetry: 96 %, O2 Delivery: None (Room Air)    Physical Exam   Constitutional: She is oriented to person, place, and time and well-developed, well-nourished, and in no distress. No distress.   Pt appears tearful. Once during exam was noted to exhibit full-body shaking and was conscious and talking during this time.   HENT:   Head: Normocephalic and atraumatic.   Eyes: Conjunctivae and EOM are normal. Right eye exhibits no discharge. Left eye exhibits no discharge. No scleral icterus.   Cardiovascular: Normal rate and regular rhythm. Exam reveals no gallop and no friction rub.   No murmur heard.  Pulmonary/Chest: Effort normal and breath sounds normal. No respiratory distress. She has no wheezes. She has no rales.   Abdominal: Soft. Bowel sounds are normal. She exhibits no distension. There is tenderness. There is no " rebound and no guarding.   No peritoneal signs. Diffuse moderate tenderness to palpation worst over the suprapubic area.   Musculoskeletal: She exhibits no edema, tenderness or deformity.   Neurological: She is alert and oriented to person, place, and time. Coordination normal.   Skin: Skin is warm and dry. No rash noted. She is not diaphoretic. No erythema. No pallor.   Psychiatric: Mood, memory, affect and judgment normal.   Vitals reviewed.        Data Review       Old Records Request:   Completed  Current Records review/summary: Completed    Lab Data Review:  Recent Results (from the past 24 hour(s))   CBC WITH DIFFERENTIAL    Collection Time: 10/13/19  8:19 AM   Result Value Ref Range    WBC 16.8 (H) 4.8 - 10.8 K/uL    RBC 4.86 4.20 - 5.40 M/uL    Hemoglobin 15.6 12.0 - 16.0 g/dL    Hematocrit 44.7 37.0 - 47.0 %    MCV 92.0 81.4 - 97.8 fL    MCH 32.1 27.0 - 33.0 pg    MCHC 34.9 33.6 - 35.0 g/dL    RDW 41.1 35.9 - 50.0 fL    Platelet Count 256 164 - 446 K/uL    MPV 11.7 9.0 - 12.9 fL    Neutrophils-Polys 81.40 (H) 44.00 - 72.00 %    Lymphocytes 8.50 (L) 22.00 - 41.00 %    Monocytes 9.40 0.00 - 13.40 %    Eosinophils 0.10 0.00 - 6.90 %    Basophils 0.20 0.00 - 1.80 %    Immature Granulocytes 0.40 0.00 - 0.90 %    Nucleated RBC 0.00 /100 WBC    Neutrophils (Absolute) 13.63 (H) 2.00 - 7.15 K/uL    Lymphs (Absolute) 1.42 1.00 - 4.80 K/uL    Monos (Absolute) 1.58 (H) 0.00 - 0.85 K/uL    Eos (Absolute) 0.02 0.00 - 0.51 K/uL    Baso (Absolute) 0.03 0.00 - 0.12 K/uL    Immature Granulocytes (abs) 0.07 0.00 - 0.11 K/uL    NRBC (Absolute) 0.00 K/uL   COMP METABOLIC PANEL    Collection Time: 10/13/19  8:19 AM   Result Value Ref Range    Sodium 133 (L) 135 - 145 mmol/L    Potassium 2.8 (L) 3.6 - 5.5 mmol/L    Chloride 76 (L) 96 - 112 mmol/L    Co2 25 20 - 33 mmol/L    Anion Gap 32.0 (H) 0.0 - 11.9    Glucose 103 (H) 65 - 99 mg/dL    Bun 49 (H) 8 - 22 mg/dL    Creatinine 2.50 (H) 0.50 - 1.40 mg/dL    Calcium 10.3 8.5 - 10.5  mg/dL    AST(SGOT) 29 12 - 45 U/L    ALT(SGPT) 23 2 - 50 U/L    Alkaline Phosphatase 90 30 - 99 U/L    Total Bilirubin 1.0 0.1 - 1.5 mg/dL    Albumin 5.5 (H) 3.2 - 4.9 g/dL    Total Protein 9.2 (H) 6.0 - 8.2 g/dL    Globulin 3.7 (H) 1.9 - 3.5 g/dL    A-G Ratio 1.5 g/dL   HCG QUAL SERUM    Collection Time: 10/13/19  8:19 AM   Result Value Ref Range    Beta-Hcg Qualitative Serum Negative Negative   MAGNESIUM    Collection Time: 10/13/19  8:19 AM   Result Value Ref Range    Magnesium 2.0 1.5 - 2.5 mg/dL   ESTIMATED GFR    Collection Time: 10/13/19  8:19 AM   Result Value Ref Range    GFR If  27 (A) >60 mL/min/1.73 m 2    GFR If Non African American 23 (A) >60 mL/min/1.73 m 2       Imaging/Procedures Review:    Independant Imaging Review: Completed  CT-HEAD W/O   Final Result         NO ACUTE ABNORMALITIES ARE NOTED ON CT SCAN OF THE HEAD.              EKG:   EKG Independent Review: Completed  QTc: 472, HR: 67, SR    Records reviewed and summarized in current documentation :  Yes  [employer] teaching service handout given to patient:  No         Assessment/Plan     * Hypokalemia- (present on admission)  Assessment & Plan  · Likely secondary to intractable nausea and vomiting, however patient has a hx of chronic hypokalemia so current value of 2.8 may be an acute on chronic problem; she also has hx of VT associated with hypokalemia (with normal TTE in 2017 ruling out underlying cardiac structural issues)  · Pt also with significant CARI with Cr >2 so will be cautious with repletion; she also has intractable n/v and is unsure if she can tolerate fluids (and therefore PO repletion)  · Receiving 10 MeQ x3 K as piggyback in ED; also ordering NS with 40 mEQ for floor. Advancing diet as tolerated; have ordered 20 mEQ PO Kdur and if can tolerate PO will preferably replete by PO (no greater than 10 mEQ/hr average)  · Q4H BMP to follow; we want to replete adequately but without eliciting hyperkalemia  · Getting EKG given  hx of VT associated with electrolyte disturbance (normal TTE 2017 so no structural source of VT); admitting to tele  · EKG with borderline prolonged QTc so will continue to replete K and avoid QTc prolonging drugs such as Zofran; troponin also ordered and negative.          Metabolic acidosis, increased anion gap  Assessment & Plan  · Patient with raised anion gap metabolic acidosis of unclear source with AG 32, delta gap 16. Lactic wnl so lactic acidosis r/o. Ddx: starvation ketoacidosis (getting beta-hydroxybutyrate and checking UA for ketones), toxic alcohol in this patient with polysubstance abuse (getting serum osm to calculate osmolar gap), abnormal calculation due to incorrect chloride value (severely low on BMP at 76; rechecking BMP and following Q4H)  · Patient with additional metabolic alkalosis, likely a contraction alkalosis in setting of intractable n/v. See intratable n/v          Cannabinoid hyperemesis syndrome (HCC)  Assessment & Plan  · Strongly suspect cannabinoid hyperemesis syndrome given patient’s hx of polysubstance abuse (with pos marijuana on prior UDS), multiple admissions during which she was diagnosed with cannabinoid hyperemesis syndrome/cyclic vomiting syndrome, and pt previously counseled not to use marijuana. She reports last marijuana use 1 month ago, but this is questionable as patient has previously denied drug abuse in setting of multi-positive UDS.  · Only item in hx not quite fitting with [facility] would be patient’s report of one episode diarrhea; consider enterocolitis picture? This is doubtful, however; FOBT negative in ED and abdominal exam completely benign.  · Getting UDS.   · IVF at 250/hr (NS + 40 mEQ KCl) and trending BMP; once K repleted will change to NS  · Initially planned to advance to clear liquid; however d/t continued pseudoseizure activity and concern for aspiration will order NGT for now.  · Anti-emetic protocol; holding off on starting zofran for now given  "borderline prolonged QTc  · Tylenol Q6H PRN for abd pain + topical capsaicin (some evidence to help abd pain in [facility]); will try to avoid narcotic administration to the extent possible in this patient with hx of polysubstance abuse and drug-seeking behavior          Metabolic alkalosis  Assessment & Plan  See Increased anion gap metabolic acidosis.    Leukocytosis- (present on admission)  Assessment & Plan  · Likely reactive in setting of severe n/v due to inflammatory picture + possible mild hemoconcentration  · SIRS otherwise negative except for very mild tachycardia which is likely secondary to hypovolemia in setting of n/v and which has since resolved with fluids; however will order blood cx x2 to rule out infectious source we do not expect. She has no other focalizing s/s infection except for n/v/d (see intractable n/v, abdominal pain, diarrhea) but abdominal exam is benign.          Psychogenic nonepileptic seizure- (present on admission)  Assessment & Plan  · Patient with “full body convulsions” but able to talk during episodes and without post-ictal period, tongue-biting, or incontinence. Additionally, this has been a recurrent problem for patient with multiple Neuro consults in past for workup including in 2015 when video EEG was negative (with two episodes of \"full body shaking\" during the VEEG), nearly definitively ruling out seizure, as well as numerous CTs of the head (including this visit) all of which have been unremarkable.   · Getting CPK but low suspicion it will be elevated  · Will NOT get Neurology consult this visit; will NOT order PRN benzodiazepines. Will recommend referral for cognitive behavioral therapy at discharge. Will communicate likely diagnosis to staff.            Anticipated Hospital stay:  >2 midnights        Quality Measures  Quality-Core Measures   Reviewed items::  EKG reviewed, Labs reviewed and Medications reviewed  Barrett catheter::  No Barrett  DVT prophylaxis pharmacological::  " Enoxaparin (Lovenox)  DVT prophylaxis - mechanical:  SCDs    PCP: Pcp Not In Computer

## 2019-10-13 NOTE — ED PROVIDER NOTES
"ED Provider Note    CHIEF COMPLAINT  Chief Complaint   Patient presents with   • Abdominal Pain       HPI  Rupa Yang is a 29 y.o. female who presents to the emergency department by ambulance for nausea and vomiting.  Patient describes 3 days of nearly intractable nausea vomiting.  Denies diarrhea but describes \"soft\" stools as well.  Generalized abdominal pain and cramping.  Patient states she has similar symptoms every 2 to 3 months, although causes not been determined.  Initially thought to be secondary to her gallbladder, this was removed without resolution of the symptoms.  Patient states she has endometriosis although laparoscopic evaluation is not been performed and symptoms of controlled with oral contraceptive.  Denies fever.  But describes malaise and fatigue.  Patient with reported seizure-like activity, which prompted boyfriend to call EMS today.  However patient states she has these seizures \"because of the pain.\"  Denies postictal behavior, incontinence or oral trauma.  Denies fall or trauma.    Patient describes total body pain, body aches.  No routine or cardiac use.  Denies illicit drug use.  Denies marijuana.    REVIEW OF SYSTEMS  See HPI for further details. All other systems are negative.     PAST MEDICAL HISTORY   has a past medical history of Arrhythmia, Dental disorder, Eczema, Gastroenteritis, Heart burn, Indigestion, Other specified disorder of intestines, Pain (02-24-15), Scoliosis, and Substance abuse.    SOCIAL HISTORY  Social History     Tobacco Use   • Smoking status: Current Every Day Smoker     Packs/day: 0.50     Types: Cigarettes   • Smokeless tobacco: Never Used   Substance and Sexual Activity   • Alcohol use: No   • Drug use: No     Comment: in past meth, cocaine, weed, states last used \"a long time ago\"   • Sexual activity: Yes     Partners: Male     Comment: planned pregnancy FOB involved       SURGICAL HISTORY   has a past surgical history that includes primary c section " "(11/27/2013) and earl by laparoscopy (3/3/2015).    CURRENT MEDICATIONS  Home Medications    **Home medications have not yet been reviewed for this encounter**         ALLERGIES  Allergies   Allergen Reactions   • Divalproex Sodium Hives and Rash         • Risperidone Hives and Rash         • Sertraline Hcl [Zoloft] Hives and Rash         • Tegretol [Carbamazepine] Hives and Rash             PHYSICAL EXAM  VITAL SIGNS: /74   Pulse (!) 102   Temp 37.1 °C (98.7 °F) (Temporal)   Resp 18   Ht 1.626 m (5' 4\")   Wt 63.5 kg (140 lb)   LMP 10/07/2019   SpO2 96%   BMI 24.03 kg/m²   Pulse ox interpretation: I interpret this pulse ox as normal.  Constitutional: Alert.  Disheveled.  HENT: Normocephalic, atraumatic. Bilateral external ears normal, Nose normal.  Dry lips and mucous membranes.    Eyes: Pupils are equal and reactive, Conjunctiva normal.    Neck: Normal range of motion, Supple.  No meningeal irritation.  Lymphatic: No lymphadenopathy noted.   Cardiovascular: Mild tachycardia otherwise regular rate and rhythm, no murmurs. Distal pulses intact.    Thorax & Lungs: Normal breath sounds.  No wheezing/rales/ronchi. No increased work of breathing, clipped speech or retractions.   Abdomen: Soft, non-distended.  Mild generalized discomfort without rebound, guarding or peritonitis.  No palpable pulsatile mass.  No CVA tenderness percussion.  Skin: Warm, Dry, No erythema, No rash.  Pale.  Musculoskeletal: Good range of motion in all major joints.   Neurologic: Somnolent but arousable and alert and oriented x4.  Speech is slow but clear.  Psychiatric: Affect normal, Judgment normal, Mood normal.       DIAGNOSTIC STUDIES / PROCEDURES    LABS  Results for orders placed or performed during the hospital encounter of 10/13/19   CBC WITH DIFFERENTIAL   Result Value Ref Range    WBC 16.8 (H) 4.8 - 10.8 K/uL    RBC 4.86 4.20 - 5.40 M/uL    Hemoglobin 15.6 12.0 - 16.0 g/dL    Hematocrit 44.7 37.0 - 47.0 %    MCV 92.0 81.4 " - 97.8 fL    MCH 32.1 27.0 - 33.0 pg    MCHC 34.9 33.6 - 35.0 g/dL    RDW 41.1 35.9 - 50.0 fL    Platelet Count 256 164 - 446 K/uL    MPV 11.7 9.0 - 12.9 fL    Neutrophils-Polys 81.40 (H) 44.00 - 72.00 %    Lymphocytes 8.50 (L) 22.00 - 41.00 %    Monocytes 9.40 0.00 - 13.40 %    Eosinophils 0.10 0.00 - 6.90 %    Basophils 0.20 0.00 - 1.80 %    Immature Granulocytes 0.40 0.00 - 0.90 %    Nucleated RBC 0.00 /100 WBC    Neutrophils (Absolute) 13.63 (H) 2.00 - 7.15 K/uL    Lymphs (Absolute) 1.42 1.00 - 4.80 K/uL    Monos (Absolute) 1.58 (H) 0.00 - 0.85 K/uL    Eos (Absolute) 0.02 0.00 - 0.51 K/uL    Baso (Absolute) 0.03 0.00 - 0.12 K/uL    Immature Granulocytes (abs) 0.07 0.00 - 0.11 K/uL    NRBC (Absolute) 0.00 K/uL   COMP METABOLIC PANEL   Result Value Ref Range    Sodium 133 (L) 135 - 145 mmol/L    Potassium 2.8 (L) 3.6 - 5.5 mmol/L    Chloride 76 (L) 96 - 112 mmol/L    Co2 25 20 - 33 mmol/L    Anion Gap 32.0 (H) 0.0 - 11.9    Glucose 103 (H) 65 - 99 mg/dL    Bun 49 (H) 8 - 22 mg/dL    Creatinine 2.50 (H) 0.50 - 1.40 mg/dL    Calcium 10.3 8.5 - 10.5 mg/dL    AST(SGOT) 29 12 - 45 U/L    ALT(SGPT) 23 2 - 50 U/L    Alkaline Phosphatase 90 30 - 99 U/L    Total Bilirubin 1.0 0.1 - 1.5 mg/dL    Albumin 5.5 (H) 3.2 - 4.9 g/dL    Total Protein 9.2 (H) 6.0 - 8.2 g/dL    Globulin 3.7 (H) 1.9 - 3.5 g/dL    A-G Ratio 1.5 g/dL   HCG QUAL SERUM   Result Value Ref Range    Beta-Hcg Qualitative Serum Negative Negative   MAGNESIUM   Result Value Ref Range    Magnesium 2.0 1.5 - 2.5 mg/dL   ESTIMATED GFR   Result Value Ref Range    GFR If  27 (A) >60 mL/min/1.73 m 2    GFR If Non African American 23 (A) >60 mL/min/1.73 m 2   Influenza A/B By PCR (Adult - Flu Only)   Result Value Ref Range    Influenza virus A RNA Negative Negative    Influenza virus B, PCR Negative Negative   LACTIC ACID   Result Value Ref Range    Lactic Acid 1.3 0.5 - 2.0 mmol/L   CREATINE KINASE   Result Value Ref Range    CPK Total 354 (H) 0 - 154  U/L   LIPASE   Result Value Ref Range    Lipase 3 (L) 11 - 82 U/L       RADIOLOGY  CT-HEAD W/O   Final Result         NO ACUTE ABNORMALITIES ARE NOTED ON CT SCAN OF THE HEAD.             COURSE & MEDICAL DECISION MAKING  Nursing notes and vital signs were reviewed. (See chart for details)  The patients records were reviewed, history was obtained from the patient;     Evaluation for nausea, vomiting abdominal pain suspicious for acute viral gastrointestinal illness of the patient describes a history of somewhat recurrent symptoms previously.  Denies marijuana use, less likely cyclic vomiting syndrome/hyperemesis.  However, patient is dehydrated, sodium 133, chloride 76 with hypokalemia, potassium 2.8 and acute renal failure with creatinine 2.50.  LFTs are within normal limits.  Influenza negative.  Pregnancy negative.  Abdominal exam is benign.  Vital signs are stable without fever or significant tachycardia, she was never hypotensive.  She received IV fluid bolus initially for clinical dehydration, potassium supplementation no infusing.  Pain controlled with single dose of fentanyl.    Patient did have some seizure-like activity, although self-limiting short episodes without postictal period.  No oral trauma, incontinence.  No intractable symptoms.  Ativan was given initially once.  Patient describes history of the same without known seizure disorder, query pseudoseizure versus psychogenic pseudoseizure.    She will be admitted to the hospital for further evaluation and treatment.  She is aware the findings and agreeable to the disposition plan.    10:09 AM [facility] IM resident is where the patient agreeable to admission.    FINAL IMPRESSION  (N17.9) Acute renal failure, unspecified acute renal failure type (HCC)  (E87.6) Hypokalemia  (E86.0) Dehydration  (R11.2) Non-intractable vomiting with nausea, unspecified vomiting type      Electronically signed by: Belia Jaramillo, 10/13/2019 10:02 AM      This dictation was  created using voice recognition software. The accuracy of the dictation is limited to the abilities of the software. I expect there may be some errors of grammar and possibly content. The nursing notes were reviewed and certain aspects of this information were incorporated into this note.

## 2019-10-13 NOTE — ASSESSMENT & PLAN NOTE
· Patient with raised anion gap metabolic acidosis of unclear source with AG 32, delta gap 16. Lactic wnl so lactic acidosis r/o. Ddx: starvation ketoacidosis (getting beta-hydroxybutyrate and checking UA for ketones), toxic alcohol in this patient with polysubstance abuse (getting serum osm to calculate osmolar gap), abnormal calculation due to incorrect chloride value (severely low on BMP at 76; rechecking BMP and following Q4H)  · Patient with additional metabolic alkalosis, likely a contraction alkalosis in setting of intractable n/v. See intractable n/v  · 10/14 elevated Betahydroxy butyrate, serum Osm WNL : starvation ketoacidosis due to abdominal pain, nausea and vomiting leading to poor oral intake  · On D5W

## 2019-10-13 NOTE — ED NOTES
This RN at bedside. While assessing pt, pt experienced 2 more episodes of full body convulsing lasting approx 30 seconds. No post ictal period. Pt answering questions immediately after episodes. Still reporting d/t pain. Safety precautions maintained with oxygen, suction at bedside and bed rails padded.

## 2019-10-13 NOTE — ED NOTES
Pt experienced another full body convulsion with unresponsiveness x30 seconds. Pt speaking clear sentences after episodes. No post ictal state noted.

## 2019-10-13 NOTE — NON-PROVIDER
"      Internal Medicine Admitting History and Physical    Note Author: Ramon Fall, Student       Name Rupa Yang Free     1990   Age/Sex 29 y.o. female   MRN [id number]   Code Status FULL     After 5PM or if no immediate response to page, please call for cross-coverage  Attending/Team: Dr. Aguirre See Patient List for primary contact information  Call [phone] to page    1st Call - Day Intern (R1):   Dr. Rose 2nd Call - Day Sr. Resident (R2/R3):   Dr. Majano       Chief Complaint:  Stomach pain/Vomiting    HPI:  Ms. Yang is a 30 y/o female with a PMH of pseudo-seizure activity and recurrent ED visits for abdominal pain/vomiting.  She admits to a history of possible endometriosis (no official diagnosis).  She complains of abdominal pain, N/V/D, fevers/chills, light headedness, dizziness, SOB and diaphoresis.     She denies hematochezia and weight loss.      Review of Systems   Constitutional: Positive for chills, diaphoresis, fever and weight loss.   HENT: Positive for sore throat. Negative for congestion, ear pain and tinnitus.    Eyes: Negative for blurred vision, double vision and pain.   Respiratory: Positive for cough and shortness of breath. Negative for hemoptysis.    Cardiovascular: Positive for palpitations. Negative for chest pain and leg swelling.   Gastrointestinal: Positive for abdominal pain, diarrhea, nausea and vomiting. Negative for blood in stool and heartburn.   Skin: Negative for rash.   Neurological: Positive for dizziness and headaches.             Past Medical History:   Past Medical History:   Diagnosis Date   • Arrhythmia     \"it feels like palpitations\"   • Dental disorder     \"my teeth are completely dead. Exposed nerves\"   • Eczema     arms, neck, mild currently   • Gastroenteritis    • Heart burn    • Indigestion    • Other specified disorder of intestines     diarrhea   • Pain 02-24-15    chronic back, abd., 2/10   • Scoliosis     dx age 10, chirpractor told severe " curvature, left leg shorter.    • Substance abuse     meth, coke, heroin, marijuana stopped after parents OD when she age 12.        Past Surgical History:  Past Surgical History:   Procedure Laterality Date   • JUDY BY LAPAROSCOPY  3/3/2015    Procedure: JUDY BY LAPAROSCOPY;  Surgeon: Aftab Churchill M.D.;  Location: SURGERY [facility];  Service:    • PRIMARY C SECTION  11/27/2013    Performed by Haider Garcia M.D. at LABOR AND DELIVERY       Current Outpatient Medications:  Home Medications     Reviewed by Ramos Baldwin (Pharmacy [facility]) on 10/13/19 at 1014  Med List Status: Complete   Medication Last Dose Status   acetaminophen (TYLENOL) 500 MG Tab > 1 week Active                Medication Allergy/Sensitivities:  Allergies   Allergen Reactions   • Divalproex Sodium Hives and Rash         • Risperidone Hives and Rash         • Sertraline Hcl [Zoloft] Hives and Rash         • Tegretol [Carbamazepine] Hives and Rash               Family History:  Family History   Problem Relation Age of Onset   • Alcohol/Drug Mother         drugs   • Diabetes Mother         NIDDM   • Alcohol/Drug Father         drugs   • Alcohol/Drug Brother    • Hypertension Maternal Aunt    • Diabetes Maternal Aunt         IDDM   • Diabetes Maternal Grandmother         IDDM   • Diabetes Maternal Grandfather         IDDM   • Cancer Paternal Grandmother         breast   • Hypertension Paternal Grandmother        Social History:  Social History     Socioeconomic History   • Marital status:      Spouse name: Not on file   • Number of children: Not on file   • Years of education: Not on file   • Highest education level: Not on file   Occupational History   • Not on file   Social Needs   • Financial resource strain: Not on file   • Food insecurity:     Worry: Not on file     Inability: Not on file   • Transportation needs:     Medical: Not on file     Non-medical: Not on file   Tobacco Use   • Smoking status: Current Every Day Smoker  "    Packs/day: 0.50     Types: Cigarettes   • Smokeless tobacco: Never Used   Substance and Sexual Activity   • Alcohol use: No   • Drug use: No     Comment: in past meth, cocaine, weed, states last used \"a long time ago\"   • Sexual activity: Yes     Partners: Male     Comment: planned pregnancy FOB involved   Lifestyle   • Physical activity:     Days per week: Not on file     Minutes per session: Not on file   • Stress: Not on file   Relationships   • Social connections:     Talks on phone: Not on file     Gets together: Not on file     Attends Mu-ism service: Not on file     Active member of club or organization: Not on file     Attends meetings of clubs or organizations: Not on file     Relationship status: Not on file   • Intimate partner violence:     Fear of current or ex partner: Not on file     Emotionally abused: Not on file     Physically abused: Not on file     Forced sexual activity: Not on file   Other Topics Concern   • Not on file   Social History Narrative   • Not on file     Living situation: Lives with roommate in an apartment  PCP : Pcp Not In Computer      Physical Exam     Vitals:    10/13/19 1345 10/13/19 1407 10/13/19 1413 10/13/19 1415   BP: (!) 94/58 (!) 90/50 (!) 99/49 113/75   Pulse: 63 85 92    Resp:  (!) 22 18    Temp:   37.3 °C (99.2 °F)    TempSrc:       SpO2: 94% 98% 99%    Weight:       Height:         Body mass index is 24.03 kg/m².  /75   Pulse 92   Temp 37.3 °C (99.2 °F)   Resp 18   Ht 1.626 m (5' 4\")   Wt 63.5 kg (140 lb)   LMP 10/07/2019   SpO2 99%   BMI 24.03 kg/m²   O2 therapy: Pulse Oximetry: 99 %, O2 Delivery: None (Room Air)    Physical Exam   Constitutional: She is oriented to person, place, and time. Vital signs are normal. She appears not dehydrated. She appears healthy.  Non-toxic appearance. She has a sickly appearance. She appears distressed.   HENT:   Head: Normocephalic and atraumatic. Head is without abrasion, without contusion and without " laceration.   Eyes: Pupils are equal, round, and reactive to light. Conjunctivae and EOM are normal.   Neck: Trachea normal and normal range of motion. Neck supple. No JVD present. No thyroid mass and no thyromegaly present.   Cardiovascular: Normal rate, regular rhythm, S1 normal, S2 normal, intact distal pulses and normal pulses. Exam reveals no gallop and no friction rub.   No murmur heard.  Pulmonary/Chest: Effort normal and breath sounds normal.   Abdominal: Soft. Normal appearance and bowel sounds are normal. She exhibits no mass. There is hepatosplenomegaly. There is generalized tenderness and tenderness in the right lower quadrant, suprapubic area and left lower quadrant. There is guarding. There is no rigidity, no rebound, no CVA tenderness and negative Vincent's sign.   Musculoskeletal: Normal range of motion.   Neurological: She is alert and oriented to person, place, and time. She has normal motor skills, normal strength and intact cranial nerves. She is not agitated and not disoriented.   Skin: Skin is warm, dry and intact. No abrasion, no bruising, no burn, no petechiae and no rash noted. She is not diaphoretic.   Psychiatric: Memory and judgment normal. Her mood appears anxious. Her affect is not blunt and not inappropriate. She is not agitated. She is not apathetic. She does not have a flat affect.             Data Review       Old Records Request:   Deferred  Current Records review/summary: Completed    Lab Data Review:  Recent Results (from the past 24 hour(s))   CBC WITH DIFFERENTIAL    Collection Time: 10/13/19  8:19 AM   Result Value Ref Range    WBC 16.8 (H) 4.8 - 10.8 K/uL    RBC 4.86 4.20 - 5.40 M/uL    Hemoglobin 15.6 12.0 - 16.0 g/dL    Hematocrit 44.7 37.0 - 47.0 %    MCV 92.0 81.4 - 97.8 fL    MCH 32.1 27.0 - 33.0 pg    MCHC 34.9 33.6 - 35.0 g/dL    RDW 41.1 35.9 - 50.0 fL    Platelet Count 256 164 - 446 K/uL    MPV 11.7 9.0 - 12.9 fL    Neutrophils-Polys 81.40 (H) 44.00 - 72.00 %     Lymphocytes 8.50 (L) 22.00 - 41.00 %    Monocytes 9.40 0.00 - 13.40 %    Eosinophils 0.10 0.00 - 6.90 %    Basophils 0.20 0.00 - 1.80 %    Immature Granulocytes 0.40 0.00 - 0.90 %    Nucleated RBC 0.00 /100 WBC    Neutrophils (Absolute) 13.63 (H) 2.00 - 7.15 K/uL    Lymphs (Absolute) 1.42 1.00 - 4.80 K/uL    Monos (Absolute) 1.58 (H) 0.00 - 0.85 K/uL    Eos (Absolute) 0.02 0.00 - 0.51 K/uL    Baso (Absolute) 0.03 0.00 - 0.12 K/uL    Immature Granulocytes (abs) 0.07 0.00 - 0.11 K/uL    NRBC (Absolute) 0.00 K/uL   COMP METABOLIC PANEL    Collection Time: 10/13/19  8:19 AM   Result Value Ref Range    Sodium 133 (L) 135 - 145 mmol/L    Potassium 2.8 (L) 3.6 - 5.5 mmol/L    Chloride 76 (L) 96 - 112 mmol/L    Co2 25 20 - 33 mmol/L    Anion Gap 32.0 (H) 0.0 - 11.9    Glucose 103 (H) 65 - 99 mg/dL    Bun 49 (H) 8 - 22 mg/dL    Creatinine 2.50 (H) 0.50 - 1.40 mg/dL    Calcium 10.3 8.5 - 10.5 mg/dL    AST(SGOT) 29 12 - 45 U/L    ALT(SGPT) 23 2 - 50 U/L    Alkaline Phosphatase 90 30 - 99 U/L    Total Bilirubin 1.0 0.1 - 1.5 mg/dL    Albumin 5.5 (H) 3.2 - 4.9 g/dL    Total Protein 9.2 (H) 6.0 - 8.2 g/dL    Globulin 3.7 (H) 1.9 - 3.5 g/dL    A-G Ratio 1.5 g/dL   HCG QUAL SERUM    Collection Time: 10/13/19  8:19 AM   Result Value Ref Range    Beta-Hcg Qualitative Serum Negative Negative   MAGNESIUM    Collection Time: 10/13/19  8:19 AM   Result Value Ref Range    Magnesium 2.0 1.5 - 2.5 mg/dL   ESTIMATED GFR    Collection Time: 10/13/19  8:19 AM   Result Value Ref Range    GFR If  27 (A) >60 mL/min/1.73 m 2    GFR If Non African American 23 (A) >60 mL/min/1.73 m 2   Influenza A/B By PCR (Adult - Flu Only)    Collection Time: 10/13/19  9:31 AM   Result Value Ref Range    Influenza virus A RNA Negative Negative    Influenza virus B, PCR Negative Negative   LACTIC ACID    Collection Time: 10/13/19 10:30 AM   Result Value Ref Range    Lactic Acid 1.3 0.5 - 2.0 mmol/L   CREATINE KINASE    Collection Time: 10/13/19  10:30 AM   Result Value Ref Range    CPK Total 354 (H) 0 - 154 U/L   LIPASE    Collection Time: 10/13/19 10:30 AM   Result Value Ref Range    Lipase 3 (L) 11 - 82 U/L   EKG    Collection Time: 10/13/19 12:23 PM   Result Value Ref Range    Report       [facility] Emergency Dept.    Test Date:  2019-10-13  Pt Name:    [facility]                 Department: ER  MRN:        [id number]                      Room:       [facility]  Gender:     Female                       Technician: [id number]  :        1990                   Requested By:BRANDAN STEVE  Order #:    [id number]                    Reading MD:    Measurements  Intervals                                Axis  Rate:       67                           P:          70  AZ:         153                          QRS:        92  QRSD:       119                          T:          37  QT:         447  QTc:        472    Interpretive Statements  Sinus arrhythmia  Nonspecific intraventricular conduction delay  Compared to ECG 2017 08:38:26  Intraventricular conduction delay now present  Sinus tachycardia no longer present  Atrial abnormality no longer present  Right-axis deviation no longer present  T-wave abnormality no longer present     EKG    Collection Time: 10/13/19 12:23 PM   Result Value Ref Range    Report       [facility] Emergency Dept.    Test Date:  2019-10-13  Pt Name:    [facility]                 Department: ER  MRN:        [id number]                      Room:        08  Gender:     Female                       Technician: [id number]  :        1990                   Requested By:ARACELI ZHONG  Order #:    [id number]                    Reading MD:    Measurements  Intervals                                Axis  Rate:       67                           P:          70  AZ:         153                          QRS:        92  QRSD:       119                          T:          37  QT:         447  QTc:         472    Interpretive Statements  Sinus arrhythmia  Nonspecific intraventricular conduction delay  Compared to ECG 12/27/2017 08:38:26  Intraventricular conduction delay now present  Sinus tachycardia no longer present  Atrial abnormality no longer present  Right-axis deviation no longer present  T-wave abnormality no longer present     Basic Metabolic Panel (BMP)    Collection Time: 10/13/19 12:50 PM   Result Value Ref Range    Sodium 133 (L) 135 - 145 mmol/L    Potassium 2.7 (LL) 3.6 - 5.5 mmol/L    Chloride 91 (L) 96 - 112 mmol/L    Co2 28 20 - 33 mmol/L    Glucose 84 65 - 99 mg/dL    Bun 33 (H) 8 - 22 mg/dL    Creatinine 1.02 0.50 - 1.40 mg/dL    Calcium 7.8 (L) 8.5 - 10.5 mg/dL    Anion Gap 14.0 (H) 0.0 - 11.9   TROPONIN    Collection Time: 10/13/19 12:50 PM   Result Value Ref Range    Troponin T <6 6 - 19 ng/L   ESTIMATED GFR    Collection Time: 10/13/19 12:50 PM   Result Value Ref Range    GFR If African American >60 >60 mL/min/1.73 m 2    GFR If Non African American >60 >60 mL/min/1.73 m 2       Imaging/Procedures Review:    Independant Imaging Review: Completed  CT-HEAD W/O   Final Result         NO ACUTE ABNORMALITIES ARE NOTED ON CT SCAN OF THE HEAD.                  EKG:   EKG Independant Review: Completed  QTc:472, HR: 67, Normal Sinus Rhythm, no ST/T changes     Records reviewed and summarized in current documentation :  Yes  [employer] teaching service handout given to patient:  No             Assessment/Plan       #CARI  -likely pre-renal 2/2 CHS  -Creatinine = 2.5  -BUN/Creatinine ratio = 19.6  -bolus NS   -maintenance fluids 250ml/hour NS 20mEQ K+  -order urine sodium to calculate FENa        #abdominal pain  -possibly 2/2 self-identified endometriosis  -b-HCG was negative in ED ruling out ectopic/pregnancy  -ordered beta hydroxybutyric acid to look for ketogenesis  -ordered urine drug screen and serum osmolality to look for EtOH      #Hyokalemia  #Hypochloremia  -likely 2/2 intractable vomiting  -possibly  2/2 malnutrion/poor nutrition  -will test urine K+ to monitor/look for renal K+ loss        #Nausea/Vomitting  -possibly due to cannabinoid hyperemesis syndrome (CHS)  -mildly increase cQTC, hold Zofran and use comethazine until cQTC shortens        Anticipated Hospital stay:  >2 midnights        Quality Measures  Quality-Core Measures  PCP: Pcp Not In Computer

## 2019-10-13 NOTE — ED NOTES
Pt experienced another 30 second full body convulsion episode. No post ictal period, no incontinence. Pt diaphoretic. Oral temp 97.7 f.

## 2019-10-14 VITALS
SYSTOLIC BLOOD PRESSURE: 108 MMHG | DIASTOLIC BLOOD PRESSURE: 70 MMHG | RESPIRATION RATE: 18 BRPM | BODY MASS INDEX: 25.52 KG/M2 | HEIGHT: 64 IN | WEIGHT: 149.47 LBS | OXYGEN SATURATION: 98 % | TEMPERATURE: 98.7 F | HEART RATE: 77 BPM

## 2019-10-14 PROBLEM — E83.42 HYPOMAGNESEMIA: Status: ACTIVE | Noted: 2019-10-14

## 2019-10-14 PROBLEM — E83.39 HYPOPHOSPHATASIA: Status: ACTIVE | Noted: 2019-10-14

## 2019-10-14 PROBLEM — R63.8 INADEQUATE ORAL INTAKE: Status: ACTIVE | Noted: 2019-10-14

## 2019-10-14 PROBLEM — N17.9 AKI (ACUTE KIDNEY INJURY) (HCC): Status: ACTIVE | Noted: 2019-10-14

## 2019-10-14 LAB
ANION GAP SERPL CALC-SCNC: 10 MMOL/L (ref 0–11.9)
ANION GAP SERPL CALC-SCNC: 12 MMOL/L (ref 0–11.9)
ANION GAP SERPL CALC-SCNC: 7 MMOL/L (ref 0–11.9)
ANION GAP SERPL CALC-SCNC: 8 MMOL/L (ref 0–11.9)
ANION GAP SERPL CALC-SCNC: 9 MMOL/L (ref 0–11.9)
BASOPHILS # BLD AUTO: 0 % (ref 0–1.8)
BASOPHILS # BLD: 0 K/UL (ref 0–0.12)
BUN SERPL-MCNC: 11 MG/DL (ref 8–22)
BUN SERPL-MCNC: 13 MG/DL (ref 8–22)
BUN SERPL-MCNC: 7 MG/DL (ref 8–22)
BUN SERPL-MCNC: 9 MG/DL (ref 8–22)
BUN SERPL-MCNC: 9 MG/DL (ref 8–22)
CALCIUM SERPL-MCNC: 8.1 MG/DL (ref 8.5–10.5)
CALCIUM SERPL-MCNC: 8.4 MG/DL (ref 8.5–10.5)
CALCIUM SERPL-MCNC: 8.5 MG/DL (ref 8.5–10.5)
CALCIUM SERPL-MCNC: 8.6 MG/DL (ref 8.5–10.5)
CALCIUM SERPL-MCNC: 8.9 MG/DL (ref 8.5–10.5)
CHLORIDE SERPL-SCNC: 101 MMOL/L (ref 96–112)
CHLORIDE SERPL-SCNC: 104 MMOL/L (ref 96–112)
CHLORIDE SERPL-SCNC: 104 MMOL/L (ref 96–112)
CHLORIDE SERPL-SCNC: 105 MMOL/L (ref 96–112)
CHLORIDE SERPL-SCNC: 105 MMOL/L (ref 96–112)
CO2 SERPL-SCNC: 20 MMOL/L (ref 20–33)
CO2 SERPL-SCNC: 21 MMOL/L (ref 20–33)
CO2 SERPL-SCNC: 21 MMOL/L (ref 20–33)
CO2 SERPL-SCNC: 22 MMOL/L (ref 20–33)
CO2 SERPL-SCNC: 25 MMOL/L (ref 20–33)
CREAT SERPL-MCNC: 0.46 MG/DL (ref 0.5–1.4)
CREAT SERPL-MCNC: 0.47 MG/DL (ref 0.5–1.4)
CREAT SERPL-MCNC: 0.5 MG/DL (ref 0.5–1.4)
CREAT SERPL-MCNC: 0.51 MG/DL (ref 0.5–1.4)
CREAT SERPL-MCNC: 0.54 MG/DL (ref 0.5–1.4)
EOSINOPHIL # BLD AUTO: 0 K/UL (ref 0–0.51)
EOSINOPHIL NFR BLD: 0 % (ref 0–6.9)
ERYTHROCYTE [DISTWIDTH] IN BLOOD BY AUTOMATED COUNT: 41.6 FL (ref 35.9–50)
GLUCOSE SERPL-MCNC: 72 MG/DL (ref 65–99)
GLUCOSE SERPL-MCNC: 77 MG/DL (ref 65–99)
GLUCOSE SERPL-MCNC: 78 MG/DL (ref 65–99)
GLUCOSE SERPL-MCNC: 82 MG/DL (ref 65–99)
GLUCOSE SERPL-MCNC: 83 MG/DL (ref 65–99)
HCT VFR BLD AUTO: 32.1 % (ref 37–47)
HGB BLD-MCNC: 11.4 G/DL (ref 12–16)
IMM GRANULOCYTES # BLD AUTO: 0.02 K/UL (ref 0–0.11)
IMM GRANULOCYTES NFR BLD AUTO: 0.2 % (ref 0–0.9)
LYMPHOCYTES # BLD AUTO: 1.15 K/UL (ref 1–4.8)
LYMPHOCYTES NFR BLD: 13.1 % (ref 22–41)
MAGNESIUM SERPL-MCNC: 1.6 MG/DL (ref 1.5–2.5)
MAGNESIUM SERPL-MCNC: 1.7 MG/DL (ref 1.5–2.5)
MCH RBC QN AUTO: 32 PG (ref 27–33)
MCHC RBC AUTO-ENTMCNC: 34.5 G/DL (ref 33.6–35)
MCV RBC AUTO: 92.9 FL (ref 81.4–97.8)
MONOCYTES # BLD AUTO: 0.93 K/UL (ref 0–0.85)
MONOCYTES NFR BLD AUTO: 10.6 % (ref 0–13.4)
NEUTROPHILS # BLD AUTO: 6.67 K/UL (ref 2–7.15)
NEUTROPHILS NFR BLD: 76.1 % (ref 44–72)
NRBC # BLD AUTO: 0 K/UL
NRBC BLD-RTO: 0 /100 WBC
PHOSPHATE SERPL-MCNC: 1.2 MG/DL (ref 2.5–4.5)
PHOSPHATE SERPL-MCNC: 1.3 MG/DL (ref 2.5–4.5)
PLATELET # BLD AUTO: 156 K/UL (ref 164–446)
PMV BLD AUTO: 11.4 FL (ref 9–12.9)
POTASSIUM SERPL-SCNC: 3.8 MMOL/L (ref 3.6–5.5)
POTASSIUM SERPL-SCNC: 3.9 MMOL/L (ref 3.6–5.5)
POTASSIUM SERPL-SCNC: 4.1 MMOL/L (ref 3.6–5.5)
POTASSIUM SERPL-SCNC: 4.3 MMOL/L (ref 3.6–5.5)
POTASSIUM SERPL-SCNC: 4.4 MMOL/L (ref 3.6–5.5)
RBC # BLD AUTO: 3.5 M/UL (ref 4.2–5.4)
SODIUM SERPL-SCNC: 133 MMOL/L (ref 135–145)
SODIUM SERPL-SCNC: 133 MMOL/L (ref 135–145)
SODIUM SERPL-SCNC: 135 MMOL/L (ref 135–145)
SODIUM SERPL-SCNC: 136 MMOL/L (ref 135–145)
SODIUM SERPL-SCNC: 137 MMOL/L (ref 135–145)
WBC # BLD AUTO: 8.8 K/UL (ref 4.8–10.8)

## 2019-10-14 PROCEDURE — 700101 HCHG RX REV CODE 250: Performed by: STUDENT IN AN ORGANIZED HEALTH CARE EDUCATION/TRAINING PROGRAM

## 2019-10-14 PROCEDURE — A9270 NON-COVERED ITEM OR SERVICE: HCPCS | Performed by: INTERNAL MEDICINE

## 2019-10-14 PROCEDURE — 84100 ASSAY OF PHOSPHORUS: CPT

## 2019-10-14 PROCEDURE — 700105 HCHG RX REV CODE 258: Performed by: INTERNAL MEDICINE

## 2019-10-14 PROCEDURE — 700102 HCHG RX REV CODE 250 W/ 637 OVERRIDE(OP): Performed by: STUDENT IN AN ORGANIZED HEALTH CARE EDUCATION/TRAINING PROGRAM

## 2019-10-14 PROCEDURE — 36415 COLL VENOUS BLD VENIPUNCTURE: CPT

## 2019-10-14 PROCEDURE — 700102 HCHG RX REV CODE 250 W/ 637 OVERRIDE(OP): Performed by: INTERNAL MEDICINE

## 2019-10-14 PROCEDURE — 80048 BASIC METABOLIC PNL TOTAL CA: CPT

## 2019-10-14 PROCEDURE — 700101 HCHG RX REV CODE 250: Performed by: INTERNAL MEDICINE

## 2019-10-14 PROCEDURE — 700111 HCHG RX REV CODE 636 W/ 250 OVERRIDE (IP): Performed by: STUDENT IN AN ORGANIZED HEALTH CARE EDUCATION/TRAINING PROGRAM

## 2019-10-14 PROCEDURE — A9270 NON-COVERED ITEM OR SERVICE: HCPCS | Performed by: STUDENT IN AN ORGANIZED HEALTH CARE EDUCATION/TRAINING PROGRAM

## 2019-10-14 PROCEDURE — 700111 HCHG RX REV CODE 636 W/ 250 OVERRIDE (IP): Mod: JW | Performed by: INTERNAL MEDICINE

## 2019-10-14 PROCEDURE — 83735 ASSAY OF MAGNESIUM: CPT

## 2019-10-14 PROCEDURE — 99233 SBSQ HOSP IP/OBS HIGH 50: CPT | Mod: GC | Performed by: INTERNAL MEDICINE

## 2019-10-14 PROCEDURE — 85025 COMPLETE CBC W/AUTO DIFF WBC: CPT

## 2019-10-14 RX ORDER — SUCRALFATE 1 G/1
1 TABLET ORAL EVERY 6 HOURS
Status: DISCONTINUED | OUTPATIENT
Start: 2019-10-14 | End: 2019-10-14 | Stop reason: HOSPADM

## 2019-10-14 RX ORDER — KETOROLAC TROMETHAMINE 30 MG/ML
10 INJECTION, SOLUTION INTRAMUSCULAR; INTRAVENOUS ONCE
Status: COMPLETED | OUTPATIENT
Start: 2019-10-14 | End: 2019-10-14

## 2019-10-14 RX ORDER — IBUPROFEN 800 MG/1
400 TABLET ORAL EVERY 6 HOURS PRN
Status: DISCONTINUED | OUTPATIENT
Start: 2019-10-14 | End: 2019-10-14 | Stop reason: HOSPADM

## 2019-10-14 RX ORDER — DEXTROSE MONOHYDRATE 50 MG/ML
INJECTION, SOLUTION INTRAVENOUS CONTINUOUS
Status: DISCONTINUED | OUTPATIENT
Start: 2019-10-14 | End: 2019-10-14 | Stop reason: HOSPADM

## 2019-10-14 RX ADMIN — Medication 400 MG: at 11:07

## 2019-10-14 RX ADMIN — OMEPRAZOLE 20 MG: 20 CAPSULE, DELAYED RELEASE ORAL at 17:26

## 2019-10-14 RX ADMIN — DEXTROSE MONOHYDRATE: 50 INJECTION, SOLUTION INTRAVENOUS at 11:06

## 2019-10-14 RX ADMIN — POTASSIUM CHLORIDE AND SODIUM CHLORIDE: 900; 300 INJECTION, SOLUTION INTRAVENOUS at 02:23

## 2019-10-14 RX ADMIN — CAPSAICIN: 0.25 CREAM TOPICAL at 05:21

## 2019-10-14 RX ADMIN — OMEPRAZOLE 20 MG: 20 CAPSULE, DELAYED RELEASE ORAL at 05:21

## 2019-10-14 RX ADMIN — SUCRALFATE 1 G: 1 TABLET ORAL at 17:30

## 2019-10-14 RX ADMIN — ACETAMINOPHEN 650 MG: 325 TABLET, FILM COATED ORAL at 00:55

## 2019-10-14 RX ADMIN — KETOROLAC TROMETHAMINE 9.99 MG: 30 INJECTION, SOLUTION INTRAMUSCULAR at 12:38

## 2019-10-14 RX ADMIN — IBUPROFEN 400 MG: 800 TABLET ORAL at 11:08

## 2019-10-14 RX ADMIN — SENNOSIDES, DOCUSATE SODIUM 2 TABLET: 50; 8.6 TABLET, FILM COATED ORAL at 17:26

## 2019-10-14 RX ADMIN — POTASSIUM PHOSPHATE, MONOBASIC AND POTASSIUM PHOSPHATE, DIBASIC 30 MMOL: 224; 236 INJECTION, SOLUTION, CONCENTRATE INTRAVENOUS at 17:32

## 2019-10-14 RX ADMIN — ACETAMINOPHEN 650 MG: 325 TABLET, FILM COATED ORAL at 15:28

## 2019-10-14 RX ADMIN — ENOXAPARIN SODIUM 30 MG: 100 INJECTION SUBCUTANEOUS at 17:30

## 2019-10-14 RX ADMIN — SENNOSIDES, DOCUSATE SODIUM 2 TABLET: 50; 8.6 TABLET, FILM COATED ORAL at 05:21

## 2019-10-14 ASSESSMENT — ENCOUNTER SYMPTOMS
DOUBLE VISION: 0
WEAKNESS: 0
CHILLS: 0
FEVER: 0
POLYDIPSIA: 0
ABDOMINAL PAIN: 1
MYALGIAS: 0
ORTHOPNEA: 0
SHORTNESS OF BREATH: 0
CONSTIPATION: 0
HALLUCINATIONS: 0
BRUISES/BLEEDS EASILY: 0
NECK PAIN: 0
DIARRHEA: 0
SPEECH CHANGE: 0
TREMORS: 0
FOCAL WEAKNESS: 0
HEARTBURN: 0
DIZZINESS: 0
PHOTOPHOBIA: 0
HEADACHES: 0
BLURRED VISION: 0
BLOOD IN STOOL: 0
SENSORY CHANGE: 0
COUGH: 0
NAUSEA: 1
VOMITING: 1
TINGLING: 0
PALPITATIONS: 0
BACK PAIN: 0
DEPRESSION: 0
SPUTUM PRODUCTION: 0
NERVOUS/ANXIOUS: 1
INSOMNIA: 0

## 2019-10-14 ASSESSMENT — LIFESTYLE VARIABLES: SUBSTANCE_ABUSE: 1

## 2019-10-14 NOTE — PROGRESS NOTES
Patient continues to have seizure like activity.  Emesis bag pulled from bedside table, patient stated she threw up.  Bag contents were urine consistency with urine like odor.

## 2019-10-14 NOTE — PROGRESS NOTES
11:13 am - seizure like activity, did not respond to nursing, no post ictal activity  11:15 am - seizure like activity,  Responded  To nursing, no post ictal activity.    This patient cursed at this nurse, educated that this language was not acceptable.  Will continue to monitor.   Patient requested to see MD at bedside, tearful and expressed that the pain meds were inadequate and would not work.  MD second page sent out for MD to bedside.      11:50 am  - continue seizure like activity approx. every 5 minutes for the last 20 min, continues to show no signs of post ictal episodes.  MD updated, no new orders at this time.

## 2019-10-14 NOTE — CARE PLAN
Problem: Infection  Goal: Will remain free from infection  Outcome: PROGRESSING SLOWER THAN EXPECTED  Note:   Patient will remain free from infection to prevent further complications. Patient educated on the importance of routine hand hygiene to help prevent the spread of infection.       Problem: Communication  Goal: The ability to communicate needs accurately and effectively will improve  Outcome: PROGRESSING SLOWER THAN EXPECTED  Note:   Patient oriented to room and surroundings. Patient educated to communicate needs accurately and effectively. Patient encouraged to vocalize questions and concerns regarding POC. No concerns or questions noted at this time.

## 2019-10-14 NOTE — ASSESSMENT & PLAN NOTE
Patient not eating enough due to pain.  Starvation Ketoacidosis at presentation    -On D5W  -watch for Refeeding syndrome

## 2019-10-14 NOTE — PROGRESS NOTES
Report received. Patient A&O x 4. Reports pain 9/10 in abdomen.  VS Stable. POC discussed, patient verbalized understanding. Belongings and bedside table within reach. Bed in low and locked positions. Fall precautions in place. Patient educated to call for assistance. Hourly rounding in place. No other needs at this time.

## 2019-10-14 NOTE — PROGRESS NOTES
· 2 RN skin check complete with MANUEL Gupta.  · Devices in place PIV, tele box  · Skin assessed under devices intact  · Confirmed pressure ulcers found on N/A  · New potential pressure ulcers noted on N/A. Wound consult placed and wound reported N/A.   · The following interventions in place: patient turns self, pillows in use for support and positioning, moisturizer at bedside    Assessment:  · All skin intact, pink, blanching  · No skin breakdown or pressure ulcers noted

## 2019-10-14 NOTE — ASSESSMENT & PLAN NOTE
-Presented with Creatinine of 2.50 with BUN/Cr ratio about 20, Baseline Cr is 0.5-1.0  -Likely prerenal due to poor oral intake and recurrent vomiting  -Received fluids following which CARI resolved.  -10/14 Cr is 0.46  -Continue with D5W @100cc/hr

## 2019-10-14 NOTE — PROGRESS NOTES
"       Internal Medicine Interval Note  Note Author: Bella Lewis M.D.     Name Rupa Yang Free     1990   Age/Sex 29 y.o. female   MRN [id number]   Code Status FULL     After 5PM or if no immediate response to page, please call for cross-coverage  Attending/Team: Dr. Johnson See Patient List for primary contact information  Call [phone] to page    1st Call - Day Intern (R1):   Dr. Lewis 2nd Call - Day Sr. Resident (R2/R3):   Dr. Majano         Reason for interval visit  (Principal Problem)   Recurrent n/v and abdominal pain, \"Seizure like activity\"      Interval Problem Daily Status Update  (24 hours, problem oriented, brief subjective history, new lab/imaging data pertinent to that problem)   -Patient complains of abdominal pain, 9.5/10 in intensity this morning, not responding to pain medication.  -She had 30ml of green bilious vomit overnight.   -Her PO intake is low due to pain and nausea.  -The patient had 1 BM yesterday,  -Patient has been having epileptiform activity every 5 min, but is able to talk and responds to noxious stimuli while having the shaking spells, no post ictal confusion.  -Her K, Mg and PO4 were low and replenished. Placed patient on q6h electrolytes monitoring in view of potential refeeding syndrome, and h/o VT due to severe hypokalemia requiring cardioversion.  - UDS positive for Cannabinoids.     Review of Systems   Constitutional: Positive for malaise/fatigue. Negative for chills and fever.   HENT: Negative for ear pain, hearing loss and tinnitus.    Eyes: Negative for blurred vision, double vision and photophobia.   Respiratory: Negative for cough, sputum production and shortness of breath.    Cardiovascular: Negative for chest pain, palpitations, orthopnea and leg swelling.   Gastrointestinal: Positive for abdominal pain, nausea and vomiting. Negative for blood in stool, constipation, diarrhea, heartburn and melena.   Genitourinary: Negative for dysuria, frequency and " urgency.   Musculoskeletal: Negative for back pain, joint pain, myalgias and neck pain.   Skin: Negative for itching and rash.   Neurological: Negative for dizziness, tingling, tremors, sensory change, speech change, focal weakness, weakness and headaches.   Endo/Heme/Allergies: Negative for polydipsia. Does not bruise/bleed easily.   Psychiatric/Behavioral: Positive for substance abuse. Negative for depression, hallucinations and suicidal ideas. The patient is nervous/anxious. The patient does not have insomnia.        Disposition/Barriers to discharge:   Clinical improvement    Consultants/Specialty  PCP: Pcp Not In Computer      Quality Measures  Quality-Core Measures   Reviewed items::  Labs reviewed and Medications reviewed  Barrett catheter::  No Barrett  DVT prophylaxis pharmacological::  Enoxaparin (Lovenox)  DVT prophylaxis - mechanical:  Not indicated at this time, ambulatory  Ulcer Prophylaxis::  No          Physical Exam       Vitals:    10/14/19 0442 10/14/19 0754 10/14/19 1139 10/14/19 1400   BP: 118/74 115/69 115/65 (!) 95/52   Pulse: 63 61 91 65   Resp: 17 14 16 18   Temp: 36.5 °C (97.7 °F) 37.1 °C (98.8 °F) 37.1 °C (98.7 °F) 37.1 °C (98.8 °F)   TempSrc: Temporal Temporal Temporal Temporal   SpO2: 96% 97% 97% 96%   Weight:       Height:         Body mass index is 25.66 kg/m². Weight: 67.8 kg (149 lb 7.6 oz)  Oxygen Therapy:  Pulse Oximetry: 96 %, O2 (LPM): 0, O2 Delivery: None (Room Air)    Physical Exam   Constitutional: She is oriented to person, place, and time. She appears distressed.   HENT:   Head: Normocephalic and atraumatic.   Mouth/Throat: Oropharynx is clear and moist. No oropharyngeal exudate.   Eyes: Pupils are equal, round, and reactive to light. Conjunctivae and EOM are normal. No scleral icterus.   Neck: Normal range of motion. Neck supple. No JVD present. No tracheal deviation present. No thyromegaly present.   Cardiovascular: Normal rate, regular rhythm, normal heart sounds and intact  distal pulses.   Pulmonary/Chest: Effort normal and breath sounds normal. No respiratory distress. She has no wheezes.   Abdominal: Soft. Bowel sounds are normal. She exhibits no distension and no mass. There is tenderness (generalized tenderness to palpation, no Vincent's sign). There is no rebound and no guarding.   Musculoskeletal: Normal range of motion. She exhibits no edema, tenderness or deformity.   Lymphadenopathy:     She has no cervical adenopathy.   Neurological: She is alert and oriented to person, place, and time.   Skin: Skin is warm and dry. No rash noted. She is not diaphoretic. No erythema.   Psychiatric: Memory and affect normal.             Assessment/Plan     * Hypokalemia- (present on admission)  Assessment & Plan  · Likely secondary to intractable nausea and vomiting, however patient has a hx of chronic hypokalemia so current value of 2.8 may be an acute on chronic problem; she also has hx of VT associated with hypokalemia (with normal TTE in 2017 ruling out underlying cardiac structural issues)  · Pt also with significant CARI with Cr >2 so will be cautious with repletion; she also has intractable n/v and is unsure if she can tolerate fluids (and therefore PO repletion)  · Receiving 10 MeQ x3 K as piggyback in ED; also ordering NS with 40 mEQ for floor. Advancing diet as tolerated; have ordered 20 mEQ PO Kdur and if can tolerate PO will preferably replete by PO (no greater than 10 mEQ/hr average)  · Q4H BMP to follow; we want to replete adequately but without eliciting hyperkalemia  · Getting EKG given hx of VT associated with electrolyte disturbance (normal TTE 2017 so no structural source of VT); admitting to tele  · EKG with borderline prolonged QTc so will continue to replete K and avoid QTc prolonging drugs such as Zofran; troponin also ordered and negative.  · 10/14 Potassium this morning is 4.3. Repeat Bmp: K 3.9 with PO4 1.3; Replenished with IV potassium phosphate  30mmol          Metabolic acidosis, increased anion gap  Assessment & Plan  · Patient with raised anion gap metabolic acidosis of unclear source with AG 32, delta gap 16. Lactic wnl so lactic acidosis r/o. Ddx: starvation ketoacidosis (getting beta-hydroxybutyrate and checking UA for ketones), toxic alcohol in this patient with polysubstance abuse (getting serum osm to calculate osmolar gap), abnormal calculation due to incorrect chloride value (severely low on BMP at 76; rechecking BMP and following Q4H)  · Patient with additional metabolic alkalosis, likely a contraction alkalosis in setting of intractable n/v. See intractable n/v  · 10/14 elevated Betahydroxy butyrate, serum Osm WNL : starvation ketoacidosis due to abdominal pain, nausea and vomiting leading to poor oral intake  · On D5W          Cannabinoid hyperemesis syndrome (HCC)  Assessment & Plan  · Strongly suspect cannabinoid hyperemesis syndrome given patient’s hx of polysubstance abuse (with pos marijuana on prior UDS), multiple admissions during which she was diagnosed with cannabinoid hyperemesis syndrome/cyclic vomiting syndrome, and pt previously counseled not to use marijuana. She reports last marijuana use 1 month ago, but this is questionable as patient has previously denied drug abuse in setting of multi-positive UDS.  · Only item in hx not quite fitting with CHS would be patient’s report of one episode diarrhea; consider enterocolitis picture? This is doubtful, however; FOBT negative in ED and abdominal exam completely benign.  · IVF at 250/hr (NS + 40 mEQ KCl) and trending BMP; once K repleted will change to NS  · Initially planned to advance to clear liquid; however d/t continued pseudoseizure activity and concern for aspiration will order NGT for now.  · Anti-emetic protocol; holding off on starting zofran for now given borderline prolonged QTc  · Tylenol Q6H PRN for abd pain + topical capsaicin (some evidence to help abd pain in [facility]); will  "try to avoid narcotic administration to the extent possible in this patient with hx of polysubstance abuse and drug-seeking behavior  · Added Ibuprofen q6h PRN, patient continued to complain of pain so Toradol injection 10mg was given.            Generalized abdominal pain  Assessment & Plan  9.5/10 abdominal pain  H/o ?endometriosis, USG 2012 shows Endometrial echo complex 5.1mm size  H/o multiple admissions for cyclical abdominal pain: endometriosis Vs Abdominal migraine  - 10/14 s/p 1 dose Toradol 10mg IV due to abdominal pain  - Acetaminophen, Ibuprofen q6h PRN  -will avoid narcotics in view of drug seeking behavior  -Outpatient gynecology follow up for further evaluation and management of endometriosis          Hypophosphatasia  Assessment & Plan  -10/14 PO4 is 1.3  -Likely due to starvation  -Will monitor q6h and replete as needed    CARI (acute kidney injury) (HCC)  Assessment & Plan  -Presented with Creatinine of 2.50 with BUN/Cr ratio about 20, Baseline Cr is 0.5-1.0  -Likely prerenal due to poor oral intake and recurrent vomiting  -Received fluids following which CARI resolved.  -10/14 Cr is 0.46  -Continue with D5W @100cc/hr      Psychogenic nonepileptic seizure- (present on admission)  Assessment & Plan  · Patient with “full body convulsions” but able to talk during episodes and without post-ictal period, tongue-biting, or incontinence. Additionally, this has been a recurrent problem for patient with multiple Neuro consults in past for workup including in 2015 when video EEG was negative (with two episodes of \"full body shaking\" during the VEEG), nearly definitively ruling out seizure, as well as numerous CTs of the head (including this visit) all of which have been unremarkable.   · Getting CPK but low suspicion it will be elevated  · Will NOT get Neurology consult this visit; will NOT order PRN benzodiazepines. Will recommend referral for cognitive behavioral therapy at discharge. Will communicate likely " diagnosis to staff.  · 10/14 Continues to have shaking spells every 5 min but responds adequately while having the episode with no post ictal confusion.          Hypomagnesemia  Assessment & Plan  -10/14 Mg is 1.6  -will watch for refeeding syndrome  -Monitor q6h and replete as needed    Inadequate oral intake  Assessment & Plan  Patient not eating enough due to pain.  Starvation Ketoacidosis at presentation    -On D5W  -watch for Refeeding syndrome    Metabolic alkalosis  Assessment & Plan  See Increased anion gap metabolic acidosis.    Leukocytosis- (present on admission)  Assessment & Plan  · Likely reactive in setting of severe n/v due to inflammatory picture + possible mild hemoconcentration  · SIRS otherwise negative except for very mild tachycardia which is likely secondary to hypovolemia in setting of n/v and which has since resolved with fluids; however will order blood cx x2 to rule out infectious source we do not expect. She has no other focalizing s/s infection except for n/v/d (see intractable n/v, abdominal pain, diarrhea) but abdominal exam is benign.  · 10/14 resolved, WBC 8.8

## 2019-10-14 NOTE — PROGRESS NOTES
Gave report to Adeel JACKSON from [employer] .Patient transferred from room T815-1 to S177-1, via hospital bed on zoll.  All medication, personal belongings and chart with patient to new room.

## 2019-10-14 NOTE — PROGRESS NOTES
Assumed care from Teri JACKSON. Received bedside report.  Updated patient on daily plan of care on white board. Patient denies any additional needs at this time.  Patient belongings and call light with in reach.  Vitals stable. Bed alarm on and working appropriately. Seizure precautions in place. Will continue to monitor.

## 2019-10-14 NOTE — ASSESSMENT & PLAN NOTE
9.5/10 abdominal pain  H/o ?endometriosis, USG 2012 shows Endometrial echo complex 5.1mm size  H/o multiple admissions for cyclical abdominal pain: endometriosis Vs Abdominal migraine  - 10/14 s/p 1 dose Toradol 10mg IV due to abdominal pain  - Acetaminophen, Ibuprofen q6h PRN  -will avoid narcotics in view of drug seeking behavior  -Outpatient gynecology follow up for further evaluation and management of endometriosis

## 2019-10-14 NOTE — PROGRESS NOTES
At bedside, patient experienced seizure like activity, no post ictal or incontinence episodes noted.  Paged [facility] for updates.

## 2019-10-15 PROBLEM — E87.3 METABOLIC ALKALOSIS: Status: RESOLVED | Noted: 2019-10-13 | Resolved: 2019-10-15

## 2019-10-15 PROBLEM — E87.29 METABOLIC ACIDOSIS, INCREASED ANION GAP: Status: RESOLVED | Noted: 2019-10-13 | Resolved: 2019-10-15

## 2019-10-15 PROBLEM — N17.9 AKI (ACUTE KIDNEY INJURY) (HCC): Status: RESOLVED | Noted: 2019-10-14 | Resolved: 2019-10-15

## 2019-10-15 PROBLEM — R11.2 CANNABINOID HYPEREMESIS SYNDROME: Status: RESOLVED | Noted: 2019-10-13 | Resolved: 2019-10-15

## 2019-10-15 PROBLEM — D72.829 LEUKOCYTOSIS: Status: RESOLVED | Noted: 2017-11-07 | Resolved: 2019-10-15

## 2019-10-15 PROBLEM — F12.90 CANNABINOID HYPEREMESIS SYNDROME: Status: RESOLVED | Noted: 2019-10-13 | Resolved: 2019-10-15

## 2019-10-15 NOTE — DISCHARGE SUMMARY
Internal Medicine Discharge Summary  Note Author: Jose Majano M.D.       Name Rupa Yang     1990   Age/Sex 29 y.o. female   MRN [id number]       Patient left AGAINST MEDICAL ADVICE      Admit Date:  10/13/2019       Discharge Date:   10/14/2018    Service:   [facility] Internal Medicine [place] Team  Attending Physician(s):   Jagdish Aguirre M.D., Jonathan Johnson M.D.     Senior Resident(s):   Jose Majano M.D.  Bruce Resident(s):   Bella Lewis M.D.  PCP: Pcp Not In Computer      Primary Diagnosis:   Hypokalemia in setting of cannabinoid hyperemesis syndrome    Secondary Diagnoses:                Principal Problem (Resolved):    Hypokalemia POA: Yes  Active Problems:    Generalized abdominal pain POA: Yes    Psychogenic nonepileptic seizure POA: Yes    Hypophosphatasia POA: Yes    Inadequate oral intake POA: Yes    Hypomagnesemia POA: Yes  Resolved Problems:    Cannabinoid hyperemesis syndrome (HCC) POA: Yes    Metabolic acidosis, increased anion gap POA: Unknown    CARI (acute kidney injury) (HCC) POA: Unknown    Leukocytosis POA: Yes    Metabolic alkalosis POA: Yes      Hospital Summary (Brief Narrative):       Ms. Yang is a pleasant 29-year-old woman with a history of endometriosis, psychogenic non-epileptiform seizures, drug-seeking behavior, polysubstance abuse, and previous AMA discharges, who was brought in by ambulance from her home due to severe abdominal pain, nausea, vomiting, weakness, and fatigue with chills spanning 2 days.  She had severe hyperkalemia of 2.7 with increased anion gap metabolic acidosis secondary to starvation ketosis with elevated beta hydroxybutyrate on presentation.  She also had a acute kidney injury with a creatinine of 2.5 (baseline 0.5), which was likely prerenal given that it corrected with D5W infusion.  Her electrolyte abnormalities were corrected.  Her nausea and vomiting as well as her high anion gap metabolic acidosis resolved following admission.  Her creatinine  returned to a baseline of 0.47 and her potassium was corrected to 3.8-4.4.  Phosphorus remains low at 1.2 and magnesium of 1.7.  Patient continued to have pseudoseizure activity, which resolved during conversations.  She reported that her seizure activity was related to her endometrial pain, which only responded to narcotic pain medication such as morphine and Dilaudid.  The pain was refractory to ibuprofen and acetaminophen.  Patient left AGAINST MEDICAL ADVICE since narcotic pain medications were not being administered.      Patient /Hospital Summary (Details -- Problem Oriented) :          Metabolic acidosis, increased anion gap  Assessment & Plan  Presented with anion gap of 32 and delta gap of 16.  Beta hydroxybutyrate was elevated likely secondary to starvation ketosis.  Lactic acid was normal.  Resolved with IV fluids with D5W.         Cannabinoid hyperemesis syndrome (HCC)  Assessment & Plan  Urine drug screen was positive for cannabinoids and benzodiazepines.  Ativan was administered in the ER due to her seizure activity.  Patient has a history of cyclical vomiting likely related to cannabinoid hyperemesis syndrome.      Generalized abdominal pain  Assessment & Plan  Patient reported her abdominal pain was due to her endometriosis and was only relieved by morphine and Dilaudid.  Pain was not responsive to acetaminophen and ibuprofen.  IV Toradol was also given.  Prior ultrasound showed endometrial echocomplex 1.5 mm size in 2012.  Narcotics were avoided due to her history of polysubstance use disorder and prior drug-seeking behavior.      * Hypokalemia  Assessment & Plan  Resolved.  As per above.    Hypophosphatasia  Assessment & Plan  Phosphate of 1.3 admission and 1.2 at time of leaving the hospital.  Likely secondary to starvation.    CARI (acute kidney injury) ([facility])  Assessment & Plan  As per above.  Likely prerenal.  Resolved with IV fluids.    Psychogenic nonepileptic seizure  Assessment & Plan  Video  EEG in 2015 was negative for epileptiform activity during her seizure activity.  Patient's rhythmic seizure activity stopped during conversations.  She was fully awake and oriented during her episodes.  No postictal state.    Hypomagnesemia  Assessment & Plan  Magnesium was 1.6 and improved to 1.7 on day she left the hospital.    Inadequate oral intake  Assessment & Plan  Elevated by beta hydroxybutyrate consistent with starvation ketosis.  BMI was 25.7.    Metabolic alkalosis  Assessment & Plan  See Increased anion gap metabolic acidosis on presentation as per above secondary to starvation ketosis.  Resolved with D5W infusion.    Leukocytosis  Assessment & Plan  Resolved.  Likely reactive in setting of severe nausea and vomiting.         Consultants:     None.    Procedures:        None.    Imaging/ Testing:      CT-HEAD W/O   Final Result         NO ACUTE ABNORMALITIES ARE NOTED ON CT SCAN OF THE HEAD.             Discharge Medications:         Medication Reconciliation: Completed       Medication List      Ask your doctor about these medications      Instructions   acetaminophen 500 MG Tabs  Commonly known as:  TYLENOL   Take 1,000-1,500 mg by mouth every 6 hours as needed. Indications: Pain  Dose:  1,000-1,500 mg              Disposition:   Patient left AGAINST MEDICAL ADVICE.    Diet:   Regular.    Activity:   As tolerated.    Instructions:      The patient was instructed to return to the ER in the event of worsening symptoms. I have counseled the patient on the importance of compliance and the patient has agreed to proceed with all medical recommendations and follow up plan indicated above.   The patient understands that all medications come with benefits and risks. Risks may include permanent injury or death and these risks can be minimized with close reassessment and monitoring.        Primary Care Provider:    Pcp Not In Computer  Discharge summary faxed to primary care provider:  Deferred  Copy of  discharge summary given to the patient: Deferred      Follow up appointment details :      No future appointments.  Pcp Not In Computer        Patient left AGAINST MEDICAL ADVICE.      Pending Studies:        None.    Time spent on discharge day patient visit, preparing discharge paperwork and arranging for patient follow up.    Summary of follow up issues:   Patient left AGAINST MEDICAL ADVICE.    Discharge Time (Minutes) :    52  Hospital Course Type:  Inpatient Stay >2 midnights      Condition on Discharge    ______________________________________________________________________    Interval history/exam for day of discharge:    Patient was awake and oriented and ambulatory.  She continues to have rhythmic seizure-like activity, which stopped on conversation.  Her neurologic exam was nonfocal.  She left AGAINST MEDICAL ADVICE.    Vitals:    10/14/19 0754 10/14/19 1139 10/14/19 1400 10/14/19 1600   BP: 115/69 115/65 (Abnormal) 95/52 108/70   Pulse: 61 91 65 77   Resp: 14 16 18 18   Temp: 37.1 °C (98.8 °F) 37.1 °C (98.7 °F) 37.1 °C (98.8 °F) 37.1 °C (98.7 °F)   TempSrc: Temporal Temporal Temporal Temporal   SpO2: 97% 97% 96% 98%   Weight:       Height:         Physical Exam   Constitutional: She is oriented to person, place, and time. She appears well-developed and well-nourished. No distress.   HENT:   Head: Normocephalic and atraumatic.   Right Ear: External ear normal.   Left Ear: External ear normal.   Nose: Nose normal.   Mouth/Throat: Oropharynx is clear and moist. No oropharyngeal exudate.   Eyes: Pupils are equal, round, and reactive to light. Conjunctivae are normal. Right eye exhibits no discharge. Left eye exhibits no discharge. No scleral icterus.   Neck: Neck supple. No JVD present. No tracheal deviation present. No thyromegaly present.   Cardiovascular: Normal rate, regular rhythm, normal heart sounds and intact distal pulses. Exam reveals no gallop and no friction rub.   No murmur  heard.  Pulmonary/Chest: Effort normal and breath sounds normal. No stridor. No respiratory distress. She has no wheezes. She has no rales.   Abdominal: Soft. Bowel sounds are normal. She exhibits no distension and no mass. There is tenderness (Diffuse tenderness to palpation). There is no rebound and no guarding.   Musculoskeletal: She exhibits no edema, tenderness or deformity.   Neurological: She is alert and oriented to person, place, and time. She exhibits normal muscle tone.   Rhythmic seizure-like activity, intermittent, stopped during conversations   Skin: Skin is warm and dry. Capillary refill takes less than 2 seconds. No rash noted. She is not diaphoretic. No erythema. No pallor.   Psychiatric:   Poor judgment, flat affect.   Nursing note and vitals reviewed.        Most Recent Labs:    Lab Results   Component Value Date/Time    WBC 8.8 10/14/2019 12:14 AM    RBC 3.50 (L) 10/14/2019 12:14 AM    HEMOGLOBIN 11.4 (L) 10/14/2019 12:14 AM    HEMATOCRIT 32.1 (L) 10/14/2019 12:14 AM    MCV 92.9 10/14/2019 12:14 AM    MCH 32.0 10/14/2019 12:14 AM    MCHC 34.5 10/14/2019 12:14 AM    MPV 11.4 10/14/2019 12:14 AM    NEUTSPOLYS 76.10 (H) 10/14/2019 12:14 AM    LYMPHOCYTES 13.10 (L) 10/14/2019 12:14 AM    MONOCYTES 10.60 10/14/2019 12:14 AM    EOSINOPHILS 0.00 10/14/2019 12:14 AM    BASOPHILS 0.00 10/14/2019 12:14 AM    HYPOCHROMIA 1+ 01/28/2013 09:13 AM    ANISOCYTOSIS 1+ 05/13/2013 10:21 AM      Lab Results   Component Value Date/Time    SODIUM 135 10/14/2019 05:40 PM    POTASSIUM 3.8 10/14/2019 05:40 PM    CHLORIDE 104 10/14/2019 05:40 PM    CO2 21 10/14/2019 05:40 PM    GLUCOSE 83 10/14/2019 05:40 PM    BUN 7 (L) 10/14/2019 05:40 PM    CREATININE 0.47 (L) 10/14/2019 05:40 PM      Lab Results   Component Value Date/Time    ALTSGPT 23 10/13/2019 08:19 AM    ASTSGOT 29 10/13/2019 08:19 AM    ALKPHOSPHAT 90 10/13/2019 08:19 AM    TBILIRUBIN 1.0 10/13/2019 08:19 AM    LIPASE 3 (L) 10/13/2019 10:30 AM    ALBUMIN 5.5 (H)  10/13/2019 08:19 AM    GLOBULIN 3.7 (H) 10/13/2019 08:19 AM    INR 1.01 10/02/2017 03:30 PM     Lab Results   Component Value Date/Time    PROTHROMBTM 13.6 10/02/2017 03:30 PM    INR 1.01 10/02/2017 03:30 PM

## 2019-10-15 NOTE — PROGRESS NOTES
"Patient has had several, lucid, shaking spells during which she was able to speak. Patient reporting hx of endometriosis and stating that her \"seizures,\" are pain induced. Pain refractory to Motrin and Tylenol. Carafate tried. Patient states, \"The only thing that helps is narcotics like Morphine and Dilaudid, but the doctors won't give it to me.\" Educated patient that she can't be sent home on narcotics due to her hx of drug dependence. Patient insistent that she just needs one dose and she'll be good. Educated patient. Patient requesting to be discharged. Paged [facility] Resident with prompt callback received. Discharge deferred to day team. Patient notified and threatening to leave AMA. Encouraged patient to stay.  "

## 2019-10-18 LAB
BACTERIA BLD CULT: NORMAL
SIGNIFICANT IND 70042: NORMAL
SITE SITE: NORMAL
SOURCE SOURCE: NORMAL

## 2021-12-30 NOTE — PROGRESS NOTES
Monitor Summary    SR [id number]  .16/.10/.38   Otolaryngologist Procedure Text (A): After obtaining clear surgical margins the patient was sent to otolaryngology for surgical repair.  The patient understands they will receive post-surgical care and follow-up from the referring physician's office.
